# Patient Record
Sex: MALE | Race: WHITE | Employment: OTHER | URBAN - METROPOLITAN AREA
[De-identification: names, ages, dates, MRNs, and addresses within clinical notes are randomized per-mention and may not be internally consistent; named-entity substitution may affect disease eponyms.]

---

## 2017-01-03 ENCOUNTER — OFFICE VISIT (OUTPATIENT)
Dept: FAMILY MEDICINE CLINIC | Age: 62
End: 2017-01-03

## 2017-01-03 VITALS
OXYGEN SATURATION: 98 % | TEMPERATURE: 97.2 F | HEART RATE: 78 BPM | BODY MASS INDEX: 25.03 KG/M2 | RESPIRATION RATE: 13 BRPM | DIASTOLIC BLOOD PRESSURE: 76 MMHG | HEIGHT: 69 IN | WEIGHT: 169 LBS | SYSTOLIC BLOOD PRESSURE: 122 MMHG

## 2017-01-03 DIAGNOSIS — E61.1 IRON DEFICIENCY: ICD-10-CM

## 2017-01-03 DIAGNOSIS — Z11.59 NEED FOR HEPATITIS C SCREENING TEST: ICD-10-CM

## 2017-01-03 DIAGNOSIS — Z11.4 SCREENING FOR HIV WITHOUT PRESENCE OF RISK FACTORS: ICD-10-CM

## 2017-01-03 DIAGNOSIS — I10 ESSENTIAL HYPERTENSION: Primary | ICD-10-CM

## 2017-01-03 DIAGNOSIS — E78.2 MIXED HYPERLIPIDEMIA: ICD-10-CM

## 2017-01-03 PROCEDURE — 99203 OFFICE O/P NEW LOW 30 MIN: CPT | Performed by: FAMILY MEDICINE

## 2017-01-03 RX ORDER — LISINOPRIL 10 MG/1
TABLET ORAL
Qty: 30 TABLET | Refills: 5 | Status: SHIPPED | OUTPATIENT
Start: 2017-01-03 | End: 2017-04-12 | Stop reason: SDUPTHER

## 2017-01-03 RX ORDER — LISINOPRIL 10 MG/1
TABLET ORAL
Refills: 0 | COMMUNITY
Start: 2016-12-19 | End: 2017-01-03 | Stop reason: SDUPTHER

## 2017-01-03 RX ORDER — SIMVASTATIN 10 MG
TABLET ORAL
Qty: 30 TABLET | Refills: 5 | Status: SHIPPED | OUTPATIENT
Start: 2017-01-03 | End: 2017-04-12 | Stop reason: SDUPTHER

## 2017-01-03 RX ORDER — ASPIRIN 81 MG
TABLET, DELAYED RELEASE (ENTERIC COATED) ORAL
Refills: 0 | COMMUNITY
Start: 2016-12-19 | End: 2017-01-03 | Stop reason: SDUPTHER

## 2017-01-03 RX ORDER — ASPIRIN 81 MG
TABLET, DELAYED RELEASE (ENTERIC COATED) ORAL
Qty: 30 TABLET | Refills: 5 | Status: SHIPPED | OUTPATIENT
Start: 2017-01-03 | End: 2017-04-12 | Stop reason: SDUPTHER

## 2017-01-03 RX ORDER — SIMVASTATIN 10 MG
TABLET ORAL
Refills: 0 | COMMUNITY
Start: 2016-12-19 | End: 2017-01-03 | Stop reason: SDUPTHER

## 2017-01-03 ASSESSMENT — ENCOUNTER SYMPTOMS
RESPIRATORY NEGATIVE: 1
EYES NEGATIVE: 1
ALLERGIC/IMMUNOLOGIC NEGATIVE: 1
GASTROINTESTINAL NEGATIVE: 1

## 2017-01-03 ASSESSMENT — PATIENT HEALTH QUESTIONNAIRE - PHQ9
2. FEELING DOWN, DEPRESSED OR HOPELESS: 0
1. LITTLE INTEREST OR PLEASURE IN DOING THINGS: 0
SUM OF ALL RESPONSES TO PHQ9 QUESTIONS 1 & 2: 0
SUM OF ALL RESPONSES TO PHQ QUESTIONS 1-9: 0

## 2017-04-12 ENCOUNTER — OFFICE VISIT (OUTPATIENT)
Dept: FAMILY MEDICINE CLINIC | Age: 62
End: 2017-04-12

## 2017-04-12 VITALS
WEIGHT: 181 LBS | DIASTOLIC BLOOD PRESSURE: 60 MMHG | BODY MASS INDEX: 26.81 KG/M2 | HEIGHT: 69 IN | HEART RATE: 107 BPM | SYSTOLIC BLOOD PRESSURE: 126 MMHG | RESPIRATION RATE: 16 BRPM | OXYGEN SATURATION: 97 % | TEMPERATURE: 97.8 F

## 2017-04-12 DIAGNOSIS — I10 ESSENTIAL HYPERTENSION: ICD-10-CM

## 2017-04-12 DIAGNOSIS — M54.16 LUMBAR RADICULOPATHY, ACUTE: Primary | ICD-10-CM

## 2017-04-12 DIAGNOSIS — E78.2 MIXED HYPERLIPIDEMIA: ICD-10-CM

## 2017-04-12 PROCEDURE — 99213 OFFICE O/P EST LOW 20 MIN: CPT | Performed by: FAMILY MEDICINE

## 2017-04-12 PROCEDURE — 96372 THER/PROPH/DIAG INJ SC/IM: CPT | Performed by: FAMILY MEDICINE

## 2017-04-12 RX ORDER — ASPIRIN 81 MG/1
TABLET ORAL
Qty: 30 TABLET | Refills: 5 | Status: SHIPPED | OUTPATIENT
Start: 2017-04-12 | End: 2017-08-23 | Stop reason: SDUPTHER

## 2017-04-12 RX ORDER — LISINOPRIL 10 MG/1
TABLET ORAL
Qty: 30 TABLET | Refills: 5 | Status: SHIPPED | OUTPATIENT
Start: 2017-04-12 | End: 2017-08-23 | Stop reason: SDUPTHER

## 2017-04-12 RX ORDER — SIMVASTATIN 10 MG
TABLET ORAL
Qty: 30 TABLET | Refills: 5 | Status: SHIPPED | OUTPATIENT
Start: 2017-04-12 | End: 2017-08-23 | Stop reason: SDUPTHER

## 2017-04-12 RX ORDER — TRIAMCINOLONE ACETONIDE 40 MG/ML
80 INJECTION, SUSPENSION INTRA-ARTICULAR; INTRAMUSCULAR ONCE
Status: COMPLETED | OUTPATIENT
Start: 2017-04-12 | End: 2017-04-12

## 2017-04-12 RX ADMIN — TRIAMCINOLONE ACETONIDE 80 MG: 40 INJECTION, SUSPENSION INTRA-ARTICULAR; INTRAMUSCULAR at 09:37

## 2017-04-12 ASSESSMENT — ENCOUNTER SYMPTOMS
BACK PAIN: 1
ABDOMINAL PAIN: 0
BOWEL INCONTINENCE: 0

## 2017-04-20 ENCOUNTER — HOSPITAL ENCOUNTER (OUTPATIENT)
Dept: PHYSICAL THERAPY | Age: 62
Setting detail: THERAPIES SERIES
Discharge: HOME | End: 2017-04-20
Payer: MEDICAID

## 2017-04-20 PROCEDURE — 97110 THERAPEUTIC EXERCISES: CPT

## 2017-04-20 PROCEDURE — 97162 PT EVAL MOD COMPLEX 30 MIN: CPT

## 2017-04-20 ASSESSMENT — PAIN DESCRIPTION - PAIN TYPE: TYPE: CHRONIC PAIN

## 2017-04-20 ASSESSMENT — PAIN SCALES - GENERAL: PAINLEVEL_OUTOF10: 8

## 2017-04-20 ASSESSMENT — PAIN DESCRIPTION - ORIENTATION: ORIENTATION: RIGHT;LOWER

## 2017-04-20 ASSESSMENT — PAIN DESCRIPTION - LOCATION: LOCATION: LEG

## 2017-04-20 ASSESSMENT — PAIN DESCRIPTION - FREQUENCY: FREQUENCY: CONTINUOUS

## 2017-04-25 ENCOUNTER — HOSPITAL ENCOUNTER (OUTPATIENT)
Dept: PHYSICAL THERAPY | Age: 62
Setting detail: THERAPIES SERIES
Discharge: HOME | End: 2017-04-25
Payer: MEDICAID

## 2017-04-25 PROCEDURE — 97110 THERAPEUTIC EXERCISES: CPT

## 2017-04-25 PROCEDURE — 97140 MANUAL THERAPY 1/> REGIONS: CPT

## 2017-04-25 ASSESSMENT — PAIN DESCRIPTION - PAIN TYPE: TYPE: CHRONIC PAIN

## 2017-04-25 ASSESSMENT — PAIN DESCRIPTION - LOCATION: LOCATION: LEG

## 2017-04-25 ASSESSMENT — PAIN DESCRIPTION - ORIENTATION: ORIENTATION: RIGHT

## 2017-04-25 ASSESSMENT — PAIN DESCRIPTION - DESCRIPTORS: DESCRIPTORS: NUMBNESS

## 2017-04-25 ASSESSMENT — PAIN DESCRIPTION - FREQUENCY: FREQUENCY: CONTINUOUS

## 2017-04-25 ASSESSMENT — PAIN SCALES - GENERAL: PAINLEVEL_OUTOF10: 7

## 2017-04-27 ENCOUNTER — HOSPITAL ENCOUNTER (OUTPATIENT)
Dept: PHYSICAL THERAPY | Age: 62
Setting detail: THERAPIES SERIES
Discharge: HOME | End: 2017-04-27
Payer: MEDICAID

## 2017-04-27 ENCOUNTER — OFFICE VISIT (OUTPATIENT)
Dept: FAMILY MEDICINE CLINIC | Age: 62
End: 2017-04-27

## 2017-04-27 VITALS
RESPIRATION RATE: 12 BRPM | HEIGHT: 69 IN | OXYGEN SATURATION: 98 % | DIASTOLIC BLOOD PRESSURE: 80 MMHG | WEIGHT: 179 LBS | SYSTOLIC BLOOD PRESSURE: 136 MMHG | BODY MASS INDEX: 26.51 KG/M2 | TEMPERATURE: 97.7 F | HEART RATE: 85 BPM

## 2017-04-27 DIAGNOSIS — M51.36 DDD (DEGENERATIVE DISC DISEASE), LUMBAR: Primary | ICD-10-CM

## 2017-04-27 DIAGNOSIS — M47.26 OSTEOARTHRITIS OF SPINE WITH RADICULOPATHY, LUMBAR REGION: ICD-10-CM

## 2017-04-27 PROBLEM — M51.369 DDD (DEGENERATIVE DISC DISEASE), LUMBAR: Status: ACTIVE | Noted: 2017-04-27

## 2017-04-27 PROCEDURE — 97140 MANUAL THERAPY 1/> REGIONS: CPT

## 2017-04-27 PROCEDURE — 99213 OFFICE O/P EST LOW 20 MIN: CPT | Performed by: FAMILY MEDICINE

## 2017-04-27 PROCEDURE — 97110 THERAPEUTIC EXERCISES: CPT

## 2017-04-27 ASSESSMENT — PAIN DESCRIPTION - DIRECTION: RADIATING_TOWARDS: NO LBP

## 2017-04-27 ASSESSMENT — PAIN DESCRIPTION - ORIENTATION: ORIENTATION: RIGHT

## 2017-04-27 ASSESSMENT — PAIN DESCRIPTION - LOCATION: LOCATION: LEG

## 2017-04-27 ASSESSMENT — PAIN DESCRIPTION - PROGRESSION: CLINICAL_PROGRESSION: GRADUALLY IMPROVING

## 2017-04-27 ASSESSMENT — PAIN DESCRIPTION - PAIN TYPE: TYPE: CHRONIC PAIN

## 2017-04-27 ASSESSMENT — ENCOUNTER SYMPTOMS
BACK PAIN: 1
ABDOMINAL PAIN: 0

## 2017-04-27 ASSESSMENT — PAIN SCALES - GENERAL: PAINLEVEL_OUTOF10: 6

## 2017-04-27 ASSESSMENT — PAIN DESCRIPTION - FREQUENCY: FREQUENCY: CONTINUOUS

## 2017-04-27 ASSESSMENT — PAIN DESCRIPTION - DESCRIPTORS: DESCRIPTORS: NUMBNESS

## 2017-05-02 ENCOUNTER — HOSPITAL ENCOUNTER (OUTPATIENT)
Dept: PHYSICAL THERAPY | Age: 62
Setting detail: THERAPIES SERIES
Discharge: HOME | End: 2017-05-02
Payer: MEDICAID

## 2017-05-02 PROCEDURE — 97110 THERAPEUTIC EXERCISES: CPT

## 2017-05-02 PROCEDURE — 97140 MANUAL THERAPY 1/> REGIONS: CPT

## 2017-05-02 ASSESSMENT — PAIN DESCRIPTION - PAIN TYPE: TYPE: CHRONIC PAIN

## 2017-05-02 ASSESSMENT — PAIN SCALES - GENERAL: PAINLEVEL_OUTOF10: 6

## 2017-05-02 ASSESSMENT — PAIN DESCRIPTION - LOCATION: LOCATION: LEG

## 2017-05-02 ASSESSMENT — PAIN DESCRIPTION - ORIENTATION: ORIENTATION: RIGHT

## 2017-05-04 ENCOUNTER — HOSPITAL ENCOUNTER (OUTPATIENT)
Dept: PHYSICAL THERAPY | Age: 62
Setting detail: THERAPIES SERIES
Discharge: HOME | End: 2017-05-04
Payer: MEDICAID

## 2017-05-04 PROCEDURE — 97140 MANUAL THERAPY 1/> REGIONS: CPT

## 2017-05-04 PROCEDURE — 97110 THERAPEUTIC EXERCISES: CPT

## 2017-05-09 ENCOUNTER — HOSPITAL ENCOUNTER (OUTPATIENT)
Dept: PHYSICAL THERAPY | Age: 62
Setting detail: THERAPIES SERIES
Discharge: HOME | End: 2017-05-09
Payer: MEDICAID

## 2017-05-09 PROCEDURE — 97110 THERAPEUTIC EXERCISES: CPT

## 2017-05-09 PROCEDURE — 97140 MANUAL THERAPY 1/> REGIONS: CPT

## 2017-05-11 ENCOUNTER — HOSPITAL ENCOUNTER (OUTPATIENT)
Dept: PHYSICAL THERAPY | Age: 62
Setting detail: THERAPIES SERIES
Discharge: HOME | End: 2017-05-11
Payer: MEDICAID

## 2017-05-11 PROCEDURE — 97110 THERAPEUTIC EXERCISES: CPT

## 2017-05-11 PROCEDURE — 97140 MANUAL THERAPY 1/> REGIONS: CPT

## 2017-05-16 ENCOUNTER — HOSPITAL ENCOUNTER (OUTPATIENT)
Dept: PHYSICAL THERAPY | Age: 62
Setting detail: THERAPIES SERIES
Discharge: HOME | End: 2017-05-16
Payer: MEDICAID

## 2017-05-16 PROCEDURE — 97110 THERAPEUTIC EXERCISES: CPT

## 2017-05-18 ENCOUNTER — HOSPITAL ENCOUNTER (OUTPATIENT)
Dept: PHYSICAL THERAPY | Age: 62
Setting detail: THERAPIES SERIES
Discharge: HOME | End: 2017-05-18
Payer: MEDICAID

## 2017-05-18 PROCEDURE — 97110 THERAPEUTIC EXERCISES: CPT

## 2017-05-18 PROCEDURE — 97140 MANUAL THERAPY 1/> REGIONS: CPT

## 2017-05-23 ENCOUNTER — HOSPITAL ENCOUNTER (OUTPATIENT)
Dept: PHYSICAL THERAPY | Age: 62
Setting detail: THERAPIES SERIES
Discharge: HOME | End: 2017-05-23
Payer: MEDICAID

## 2017-05-23 PROCEDURE — 97110 THERAPEUTIC EXERCISES: CPT

## 2017-08-03 DIAGNOSIS — Z11.4 SCREENING FOR HIV WITHOUT PRESENCE OF RISK FACTORS: ICD-10-CM

## 2017-08-03 DIAGNOSIS — Z11.59 NEED FOR HEPATITIS C SCREENING TEST: ICD-10-CM

## 2017-08-03 DIAGNOSIS — E78.2 MIXED HYPERLIPIDEMIA: ICD-10-CM

## 2017-08-03 DIAGNOSIS — E61.1 IRON DEFICIENCY: ICD-10-CM

## 2017-08-03 DIAGNOSIS — I10 ESSENTIAL HYPERTENSION: ICD-10-CM

## 2017-08-03 LAB
ALBUMIN SERPL-MCNC: 4.1 G/DL (ref 3.9–4.9)
ALP BLD-CCNC: 48 U/L (ref 35–104)
ALT SERPL-CCNC: 20 U/L (ref 0–41)
ANION GAP SERPL CALCULATED.3IONS-SCNC: 14 MEQ/L (ref 7–13)
AST SERPL-CCNC: 21 U/L (ref 0–40)
BASOPHILS ABSOLUTE: 0 K/UL (ref 0–0.2)
BASOPHILS RELATIVE PERCENT: 0.7 %
BILIRUB SERPL-MCNC: 0.4 MG/DL (ref 0–1.2)
BUN BLDV-MCNC: 18 MG/DL (ref 8–23)
CALCIUM SERPL-MCNC: 9 MG/DL (ref 8.6–10.2)
CHLORIDE BLD-SCNC: 104 MEQ/L (ref 98–107)
CHOLESTEROL, TOTAL: 157 MG/DL (ref 0–199)
CO2: 22 MEQ/L (ref 22–29)
CREAT SERPL-MCNC: 1.14 MG/DL (ref 0.7–1.2)
EOSINOPHILS ABSOLUTE: 0.1 K/UL (ref 0–0.7)
EOSINOPHILS RELATIVE PERCENT: 2.1 %
GFR AFRICAN AMERICAN: >60
GFR NON-AFRICAN AMERICAN: >60
GLOBULIN: 2.6 G/DL (ref 2.3–3.5)
GLUCOSE BLD-MCNC: 143 MG/DL (ref 74–109)
HCT VFR BLD CALC: 40.2 % (ref 42–52)
HDLC SERPL-MCNC: 58 MG/DL (ref 40–59)
HEMOGLOBIN: 13.4 G/DL (ref 14–18)
HEPATITIS C ANTIBODY INTERPRETATION: NORMAL
IRON SATURATION: 19 % (ref 11–46)
IRON: 78 UG/DL (ref 59–158)
LDL CHOLESTEROL CALCULATED: 83 MG/DL (ref 0–129)
LYMPHOCYTES ABSOLUTE: 1.2 K/UL (ref 1–4.8)
LYMPHOCYTES RELATIVE PERCENT: 22.9 %
MCH RBC QN AUTO: 30.7 PG (ref 27–31.3)
MCHC RBC AUTO-ENTMCNC: 33.3 % (ref 33–37)
MCV RBC AUTO: 92.1 FL (ref 80–100)
MONOCYTES ABSOLUTE: 0.5 K/UL (ref 0.2–0.8)
MONOCYTES RELATIVE PERCENT: 9.3 %
NEUTROPHILS ABSOLUTE: 3.3 K/UL (ref 1.4–6.5)
NEUTROPHILS RELATIVE PERCENT: 65 %
PDW BLD-RTO: 13.8 % (ref 11.5–14.5)
PLATELET # BLD: 150 K/UL (ref 130–400)
POTASSIUM SERPL-SCNC: 4.5 MEQ/L (ref 3.5–5.1)
RBC # BLD: 4.37 M/UL (ref 4.7–6.1)
SODIUM BLD-SCNC: 140 MEQ/L (ref 132–144)
TOTAL IRON BINDING CAPACITY: 415 UG/DL (ref 178–450)
TOTAL PROTEIN: 6.7 G/DL (ref 6.4–8.1)
TRIGL SERPL-MCNC: 81 MG/DL (ref 0–200)
WBC # BLD: 5 K/UL (ref 4.8–10.8)

## 2017-08-04 LAB
HIV-1 AND HIV-2 ANTIBODIES: NEGATIVE
TSH, 3RD GENERATION: 2.04 MU/L (ref 0.3–4)

## 2017-08-05 LAB
VITAMIN D2 AND D3, TOTAL: 32.5 NG/ML (ref 30–80)
VITAMIN D2, 25 HYDROXY: <1 NG/ML
VITAMIN D3,25 HYDROXY: 32.5 NG/ML

## 2017-08-23 ENCOUNTER — OFFICE VISIT (OUTPATIENT)
Dept: FAMILY MEDICINE CLINIC | Age: 62
End: 2017-08-23

## 2017-08-23 VITALS
DIASTOLIC BLOOD PRESSURE: 80 MMHG | WEIGHT: 178 LBS | HEART RATE: 87 BPM | OXYGEN SATURATION: 98 % | TEMPERATURE: 97.3 F | RESPIRATION RATE: 16 BRPM | SYSTOLIC BLOOD PRESSURE: 122 MMHG | BODY MASS INDEX: 26.36 KG/M2 | HEIGHT: 69 IN

## 2017-08-23 DIAGNOSIS — E78.2 MIXED HYPERLIPIDEMIA: ICD-10-CM

## 2017-08-23 DIAGNOSIS — I10 ESSENTIAL HYPERTENSION: Primary | ICD-10-CM

## 2017-08-23 DIAGNOSIS — R73.9 HYPERGLYCEMIA: ICD-10-CM

## 2017-08-23 DIAGNOSIS — Z12.11 SCREENING FOR COLON CANCER: ICD-10-CM

## 2017-08-23 PROCEDURE — 93000 ELECTROCARDIOGRAM COMPLETE: CPT | Performed by: FAMILY MEDICINE

## 2017-08-23 PROCEDURE — 99214 OFFICE O/P EST MOD 30 MIN: CPT | Performed by: FAMILY MEDICINE

## 2017-08-23 RX ORDER — POLYETHYLENE GLYCOL 3350, SODIUM CHLORIDE, SODIUM BICARBONATE, POTASSIUM CHLORIDE 420; 11.2; 5.72; 1.48 G/4L; G/4L; G/4L; G/4L
4000 POWDER, FOR SOLUTION ORAL ONCE
Qty: 4000 ML | Refills: 0 | Status: CANCELLED | OUTPATIENT
Start: 2017-08-23 | End: 2017-08-23

## 2017-08-23 RX ORDER — LISINOPRIL 10 MG/1
TABLET ORAL
Qty: 30 TABLET | Refills: 5 | Status: SHIPPED | OUTPATIENT
Start: 2017-08-23 | End: 2018-02-20 | Stop reason: SDUPTHER

## 2017-08-23 RX ORDER — SIMVASTATIN 10 MG
TABLET ORAL
Qty: 30 TABLET | Refills: 5 | Status: SHIPPED | OUTPATIENT
Start: 2017-08-23 | End: 2018-02-20 | Stop reason: SDUPTHER

## 2017-08-23 RX ORDER — ASPIRIN 81 MG/1
TABLET ORAL
Qty: 30 TABLET | Refills: 5 | Status: SHIPPED | OUTPATIENT
Start: 2017-08-23 | End: 2018-02-20 | Stop reason: SDUPTHER

## 2017-08-23 ASSESSMENT — ENCOUNTER SYMPTOMS
RESPIRATORY NEGATIVE: 1
ALLERGIC/IMMUNOLOGIC NEGATIVE: 1
BLURRED VISION: 0
ORTHOPNEA: 0
GASTROINTESTINAL NEGATIVE: 1
SHORTNESS OF BREATH: 0
EYES NEGATIVE: 1

## 2017-11-10 DIAGNOSIS — R73.9 HYPERGLYCEMIA: ICD-10-CM

## 2017-11-10 DIAGNOSIS — E78.2 MIXED HYPERLIPIDEMIA: ICD-10-CM

## 2017-11-10 DIAGNOSIS — I10 ESSENTIAL HYPERTENSION: ICD-10-CM

## 2017-11-10 LAB
ALBUMIN SERPL-MCNC: 3.9 G/DL (ref 3.9–4.9)
ALP BLD-CCNC: 51 U/L (ref 35–104)
ALT SERPL-CCNC: 35 U/L (ref 0–41)
ANION GAP SERPL CALCULATED.3IONS-SCNC: 16 MEQ/L (ref 7–13)
AST SERPL-CCNC: 30 U/L (ref 0–40)
BILIRUB SERPL-MCNC: 0.4 MG/DL (ref 0–1.2)
BUN BLDV-MCNC: 17 MG/DL (ref 8–23)
CALCIUM SERPL-MCNC: 9.4 MG/DL (ref 8.6–10.2)
CHLORIDE BLD-SCNC: 104 MEQ/L (ref 98–107)
CHOLESTEROL, TOTAL: 166 MG/DL (ref 0–199)
CO2: 23 MEQ/L (ref 22–29)
CREAT SERPL-MCNC: 1.13 MG/DL (ref 0.7–1.2)
CREATININE URINE: 235 MG/DL
GFR AFRICAN AMERICAN: >60
GFR NON-AFRICAN AMERICAN: >60
GLOBULIN: 2.7 G/DL (ref 2.3–3.5)
GLUCOSE BLD-MCNC: 180 MG/DL (ref 74–109)
HBA1C MFR BLD: 8.4 % (ref 4.8–5.9)
HDLC SERPL-MCNC: 58 MG/DL (ref 40–59)
LDL CHOLESTEROL CALCULATED: 87 MG/DL (ref 0–129)
MICROALBUMIN UR-MCNC: 1.3 MG/DL
MICROALBUMIN/CREAT UR-RTO: 5.5 MG/G (ref 0–30)
POTASSIUM SERPL-SCNC: 4.7 MEQ/L (ref 3.5–5.1)
SODIUM BLD-SCNC: 143 MEQ/L (ref 132–144)
TOTAL PROTEIN: 6.6 G/DL (ref 6.4–8.1)
TRIGL SERPL-MCNC: 106 MG/DL (ref 0–200)

## 2017-11-17 ENCOUNTER — OFFICE VISIT (OUTPATIENT)
Dept: FAMILY MEDICINE CLINIC | Age: 62
End: 2017-11-17

## 2017-11-17 VITALS
OXYGEN SATURATION: 99 % | WEIGHT: 191 LBS | DIASTOLIC BLOOD PRESSURE: 72 MMHG | HEIGHT: 69 IN | HEART RATE: 85 BPM | RESPIRATION RATE: 16 BRPM | BODY MASS INDEX: 28.29 KG/M2 | TEMPERATURE: 97.6 F | SYSTOLIC BLOOD PRESSURE: 120 MMHG

## 2017-11-17 DIAGNOSIS — E78.2 MIXED HYPERLIPIDEMIA: ICD-10-CM

## 2017-11-17 DIAGNOSIS — E11.9 TYPE 2 DIABETES MELLITUS WITHOUT COMPLICATION, WITHOUT LONG-TERM CURRENT USE OF INSULIN (HCC): Primary | ICD-10-CM

## 2017-11-17 DIAGNOSIS — I10 ESSENTIAL HYPERTENSION: ICD-10-CM

## 2017-11-17 DIAGNOSIS — Z23 NEED FOR 23-POLYVALENT PNEUMOCOCCAL POLYSACCHARIDE VACCINE: ICD-10-CM

## 2017-11-17 PROCEDURE — 3017F COLORECTAL CA SCREEN DOC REV: CPT | Performed by: FAMILY MEDICINE

## 2017-11-17 PROCEDURE — G8427 DOCREV CUR MEDS BY ELIG CLIN: HCPCS | Performed by: FAMILY MEDICINE

## 2017-11-17 PROCEDURE — 3045F PR MOST RECENT HEMOGLOBIN A1C LEVEL 7.0-9.0%: CPT | Performed by: FAMILY MEDICINE

## 2017-11-17 PROCEDURE — 1036F TOBACCO NON-USER: CPT | Performed by: FAMILY MEDICINE

## 2017-11-17 PROCEDURE — G8482 FLU IMMUNIZE ORDER/ADMIN: HCPCS | Performed by: FAMILY MEDICINE

## 2017-11-17 PROCEDURE — 90471 IMMUNIZATION ADMIN: CPT | Performed by: FAMILY MEDICINE

## 2017-11-17 PROCEDURE — G8419 CALC BMI OUT NRM PARAM NOF/U: HCPCS | Performed by: FAMILY MEDICINE

## 2017-11-17 PROCEDURE — 90732 PPSV23 VACC 2 YRS+ SUBQ/IM: CPT | Performed by: FAMILY MEDICINE

## 2017-11-17 PROCEDURE — 99214 OFFICE O/P EST MOD 30 MIN: CPT | Performed by: FAMILY MEDICINE

## 2017-11-17 ASSESSMENT — ENCOUNTER SYMPTOMS
EYES NEGATIVE: 1
ALLERGIC/IMMUNOLOGIC NEGATIVE: 1
GASTROINTESTINAL NEGATIVE: 1
RESPIRATORY NEGATIVE: 1
SHORTNESS OF BREATH: 0

## 2017-11-17 NOTE — PROGRESS NOTES
Subjective  Alisson Perales, 57 y.o. male presents today with:  Chief Complaint   Patient presents with    3 Month Follow-Up    Hypertension    Hyperlipidemia    Arthritis    Results     labs        Diabetes   He presents for his follow-up diabetic visit. He has type 2 diabetes mellitus. His disease course has been stable. There are no hypoglycemic associated symptoms. Pertinent negatives for hypoglycemia include no headaches. There are no diabetic associated symptoms. Pertinent negatives for diabetes include no chest pain. There are no hypoglycemic complications. Symptoms are stable. Pertinent negatives for diabetic complications include no autonomic neuropathy, CVA, heart disease, impotence, nephropathy, peripheral neuropathy, PVD or retinopathy. Risk factors for coronary artery disease include diabetes mellitus, dyslipidemia and hypertension. When asked about current treatments, none were reported. He is compliant with treatment all of the time. His weight is stable. He is following a generally healthy diet. Meal planning includes avoidance of concentrated sweets. He has not had a previous visit with a dietitian (ordered but insurance doesn't cover- per pt insurance sent  diabetes teaching [facility] he has reviewed). He participates in exercise weekly. There is no change in his home blood glucose trend. An ACE inhibitor/angiotensin II receptor blocker is being taken. He does not see a podiatrist.Eye exam is current. Hypertension   Pertinent negatives include no chest pain, headaches, neck pain, palpitations or shortness of breath. There is no history of CVA, PVD or retinopathy. Hyperlipidemia   Pertinent negatives include no chest pain or shortness of breath. Review of Systems   Constitutional: Negative. HENT: Negative. Eyes: Negative. Respiratory: Negative. Negative for shortness of breath. Cardiovascular: Negative. Negative for chest pain and palpitations.    Gastrointestinal: Negative. Endocrine: Negative. Genitourinary: Negative. Negative for impotence. Musculoskeletal: Negative. Negative for neck pain. Skin: Negative. Allergic/Immunologic: Negative. Neurological: Negative. Negative for headaches. Hematological: Negative. Psychiatric/Behavioral: Negative. Past Medical History:   Diagnosis Date    Cancer ([facility] [address]     Hyperlipidemia     Hypertension      Past Surgical History:   Procedure Laterality Date    TUMOR REMOVAL  1994     Social History     Social History    Marital status: Single     Spouse name: N/A    Number of children: N/A    Years of education: N/A     Occupational History    Not on file. Social History Main Topics    Smoking status: Never Smoker    Smokeless tobacco: Never Used    Alcohol use No    Drug use: No    Sexual activity: Not on file     Other Topics Concern    Not on file     Social History Narrative    No narrative on file     Family History   Problem Relation Age of Onset    Cancer Mother      ovarian     Other Father      No Known Allergies  Current Outpatient Prescriptions on File Prior to Visit   Medication Sig Dispense Refill    aspirin (ASPIRIN LOW DOSE) 81 MG EC tablet take 1 tablet by mouth once daily 30 tablet 5    lisinopril (PRINIVIL;ZESTRIL) 10 MG tablet take 1 tablet by mouth once daily 30 tablet 5    simvastatin (ZOCOR) 10 MG tablet take 1 tablet by mouth once daily 30 tablet 5     No current facility-administered medications on file prior to visit. Objective    Vitals:    11/17/17 0813   BP: 120/72   Pulse: 85   Resp: 16   Temp: 97.6 °F (36.4 °C)   TempSrc: Tympanic   SpO2: 99%   Weight: 191 lb (86.6 kg)   Height: 5' 8.5\" (1.74 m)     Physical Exam   Constitutional: Vital signs are normal. He appears well-developed. HENT:   Head: Normocephalic and atraumatic. Right Ear: Tympanic membrane, external ear and ear canal normal. Tympanic membrane is not injected. No middle ear effusion. Metabolic Panel    Hemoglobin A1C    Lipid Panel    Microalbumin / Creatinine Urine Ratio    TSH ULTRASENSITIVE, DIRECTED   2. Essential hypertension  Comprehensive Metabolic Panel    Lipid Panel    Microalbumin / Creatinine Urine Ratio    TSH ULTRASENSITIVE, DIRECTED   3. Mixed hyperlipidemia  Comprehensive Metabolic Panel    Lipid Panel    TSH ULTRASENSITIVE, DIRECTED   4. Need for 23-polyvalent pneumococcal polysaccharide vaccine  Pneumococcal polysaccharide vaccine 23-valent greater than or equal to 3yo subcutaneous/IM     Orders Placed This Encounter   Procedures    Pneumococcal polysaccharide vaccine 23-valent greater than or equal to 3yo subcutaneous/IM    Comprehensive Metabolic Panel     Standing Status:   Future     Standing Expiration Date:   11/17/2018    Hemoglobin A1C     Standing Status:   Future     Standing Expiration Date:   11/17/2018    Lipid Panel     Standing Status:   Future     Standing Expiration Date:   11/17/2018     Order Specific Question:   Is Patient Fasting?/# of Hours     Answer:   8    Microalbumin / Creatinine Urine Ratio     Standing Status:   Future     Standing Expiration Date:   11/17/2018    TSH ULTRASENSITIVE, DIRECTED     Standing Status:   Future     Standing Expiration Date:   11/17/2018     Orders Placed This Encounter   Medications    metFORMIN (GLUCOPHAGE) 500 MG tablet     Sig: Take 1 tablet by mouth 2 times daily (with meals)     Dispense:  60 tablet     Refill:  3    SITagliptin (JANUVIA) 100 MG tablet     Sig: Take 1 tablet by mouth daily     Dispense:  30 tablet     Refill:  3     There are no discontinued medications. Counseling given: Yes      Return in about 3 months (around 2/17/2018).     Trev Bishop, DO

## 2018-02-13 DIAGNOSIS — E11.9 TYPE 2 DIABETES MELLITUS WITHOUT COMPLICATION, WITHOUT LONG-TERM CURRENT USE OF INSULIN (HCC): ICD-10-CM

## 2018-02-13 DIAGNOSIS — I10 ESSENTIAL HYPERTENSION: ICD-10-CM

## 2018-02-13 DIAGNOSIS — E78.2 MIXED HYPERLIPIDEMIA: ICD-10-CM

## 2018-02-13 LAB
ALBUMIN SERPL-MCNC: 4.5 G/DL (ref 3.9–4.9)
ALP BLD-CCNC: 57 U/L (ref 35–104)
ALT SERPL-CCNC: 30 U/L (ref 0–41)
ANION GAP SERPL CALCULATED.3IONS-SCNC: 16 MEQ/L (ref 7–13)
AST SERPL-CCNC: 24 U/L (ref 0–40)
BILIRUB SERPL-MCNC: 0.3 MG/DL (ref 0–1.2)
BUN BLDV-MCNC: 20 MG/DL (ref 8–23)
CALCIUM SERPL-MCNC: 10.2 MG/DL (ref 8.6–10.2)
CHLORIDE BLD-SCNC: 100 MEQ/L (ref 98–107)
CHOLESTEROL, TOTAL: 174 MG/DL (ref 0–199)
CO2: 23 MEQ/L (ref 22–29)
CREAT SERPL-MCNC: 1.26 MG/DL (ref 0.7–1.2)
CREATININE URINE: 275.8 MG/DL
GFR AFRICAN AMERICAN: >60
GFR NON-AFRICAN AMERICAN: 57.5
GLOBULIN: 2.7 G/DL (ref 2.3–3.5)
GLUCOSE BLD-MCNC: 201 MG/DL (ref 74–109)
HBA1C MFR BLD: 9.1 % (ref 4.8–5.9)
HDLC SERPL-MCNC: 55 MG/DL (ref 40–59)
LDL CHOLESTEROL CALCULATED: 92 MG/DL (ref 0–129)
MICROALBUMIN UR-MCNC: 1.5 MG/DL
MICROALBUMIN/CREAT UR-RTO: 5.4 MG/G (ref 0–30)
POTASSIUM SERPL-SCNC: 4.7 MEQ/L (ref 3.5–5.1)
SODIUM BLD-SCNC: 139 MEQ/L (ref 132–144)
TOTAL PROTEIN: 7.2 G/DL (ref 6.4–8.1)
TRIGL SERPL-MCNC: 133 MG/DL (ref 0–200)
TSH REFLEX: 2.29 UIU/ML (ref 0.27–4.2)

## 2018-02-20 ENCOUNTER — OFFICE VISIT (OUTPATIENT)
Dept: FAMILY MEDICINE CLINIC | Age: 63
End: 2018-02-20
Payer: MEDICAID

## 2018-02-20 VITALS
BODY MASS INDEX: 28.44 KG/M2 | DIASTOLIC BLOOD PRESSURE: 70 MMHG | HEART RATE: 96 BPM | HEIGHT: 69 IN | TEMPERATURE: 97.4 F | OXYGEN SATURATION: 98 % | WEIGHT: 192 LBS | RESPIRATION RATE: 16 BRPM | SYSTOLIC BLOOD PRESSURE: 118 MMHG

## 2018-02-20 DIAGNOSIS — E78.2 MIXED HYPERLIPIDEMIA: ICD-10-CM

## 2018-02-20 DIAGNOSIS — Z12.11 COLON CANCER SCREENING: ICD-10-CM

## 2018-02-20 DIAGNOSIS — E11.9 TYPE 2 DIABETES MELLITUS WITHOUT COMPLICATION, WITHOUT LONG-TERM CURRENT USE OF INSULIN (HCC): Primary | ICD-10-CM

## 2018-02-20 DIAGNOSIS — I10 ESSENTIAL HYPERTENSION: ICD-10-CM

## 2018-02-20 PROCEDURE — 99214 OFFICE O/P EST MOD 30 MIN: CPT | Performed by: FAMILY MEDICINE

## 2018-02-20 PROCEDURE — 3046F HEMOGLOBIN A1C LEVEL >9.0%: CPT | Performed by: FAMILY MEDICINE

## 2018-02-20 PROCEDURE — 1036F TOBACCO NON-USER: CPT | Performed by: FAMILY MEDICINE

## 2018-02-20 PROCEDURE — G8427 DOCREV CUR MEDS BY ELIG CLIN: HCPCS | Performed by: FAMILY MEDICINE

## 2018-02-20 PROCEDURE — G8419 CALC BMI OUT NRM PARAM NOF/U: HCPCS | Performed by: FAMILY MEDICINE

## 2018-02-20 PROCEDURE — 3017F COLORECTAL CA SCREEN DOC REV: CPT | Performed by: FAMILY MEDICINE

## 2018-02-20 PROCEDURE — G8482 FLU IMMUNIZE ORDER/ADMIN: HCPCS | Performed by: FAMILY MEDICINE

## 2018-02-20 RX ORDER — POLYETHYLENE GLYCOL 3350, SODIUM CHLORIDE, SODIUM BICARBONATE, POTASSIUM CHLORIDE 420; 11.2; 5.72; 1.48 G/4L; G/4L; G/4L; G/4L
4000 POWDER, FOR SOLUTION ORAL ONCE
Qty: 4000 ML | Refills: 0 | Status: SHIPPED | OUTPATIENT
Start: 2018-02-20 | End: 2018-02-20

## 2018-02-20 RX ORDER — LANCETS 30 GAUGE
EACH MISCELLANEOUS
Qty: 100 EACH | Refills: 1 | Status: SHIPPED | OUTPATIENT
Start: 2018-02-20 | End: 2018-04-23

## 2018-02-20 RX ORDER — ASPIRIN 81 MG/1
TABLET ORAL
Qty: 30 TABLET | Refills: 5 | Status: SHIPPED | OUTPATIENT
Start: 2018-02-20 | End: 2018-04-16 | Stop reason: SDUPTHER

## 2018-02-20 RX ORDER — LISINOPRIL 10 MG/1
TABLET ORAL
Qty: 30 TABLET | Refills: 5 | Status: SHIPPED | OUTPATIENT
Start: 2018-02-20 | End: 2018-04-16 | Stop reason: SDUPTHER

## 2018-02-20 RX ORDER — SIMVASTATIN 10 MG
TABLET ORAL
Qty: 30 TABLET | Refills: 5 | Status: SHIPPED | OUTPATIENT
Start: 2018-02-20 | End: 2018-04-16 | Stop reason: SDUPTHER

## 2018-02-20 ASSESSMENT — ENCOUNTER SYMPTOMS
SHORTNESS OF BREATH: 0
EYES NEGATIVE: 1
ALLERGIC/IMMUNOLOGIC NEGATIVE: 1
GASTROINTESTINAL NEGATIVE: 1
RESPIRATORY NEGATIVE: 1

## 2018-02-20 ASSESSMENT — PATIENT HEALTH QUESTIONNAIRE - PHQ9
SUM OF ALL RESPONSES TO PHQ QUESTIONS 1-9: 0
1. LITTLE INTEREST OR PLEASURE IN DOING THINGS: 0
SUM OF ALL RESPONSES TO PHQ9 QUESTIONS 1 & 2: 0
2. FEELING DOWN, DEPRESSED OR HOPELESS: 0

## 2018-02-20 NOTE — PROGRESS NOTES
maxillary sinus tenderness and no frontal sinus tenderness. Eyes: Conjunctivae, EOM and lids are normal. Pupils are equal, round, and reactive to light. Neck: Normal range of motion. Neck supple. No JVD present. No muscular tenderness present. No neck rigidity. No tracheal deviation present. No thyroid mass and no thyromegaly present. Cardiovascular: Normal rate, regular rhythm and intact distal pulses. Pulmonary/Chest: Effort normal. He has no decreased breath sounds. He has no wheezes. He has no rhonchi. He has no rales. He exhibits no tenderness and no deformity. Abdominal: Soft. Normal appearance and bowel sounds are normal. He exhibits no distension and no mass. There is no splenomegaly or hepatomegaly. There is no tenderness. There is no rigidity, no rebound and no guarding. No hernia. Musculoskeletal: Normal range of motion. Right knee: Normal.        Left knee: Normal.        Cervical back: Normal.        Thoracic back: Normal.        Lumbar back: Normal.        Lymphadenopathy:     He has no cervical adenopathy. Neurological: He is alert. He has normal strength. He displays no atrophy and no tremor. No cranial nerve deficit or sensory deficit. Coordination and gait normal.   Skin: Skin is warm, dry and intact. No rash noted. Psychiatric: He has a normal mood and affect. His speech is normal and behavior is normal. Judgment and thought content normal. Cognition and memory are normal.            Assessment & Plan   1. Type 2 diabetes mellitus without complication, without long-term current use of insulin (formerly [facility])  metFORMIN (GLUCOPHAGE) 500 MG tablet    glucose blood VI test strips (ASCENSIA AUTODISC VI;ONE TOUCH ULTRA TEST VI) strip    Blood Glucose Monitoring Suppl BRANDYN Hernandez MISC    HM DIABETES FOOT EXAM    Ambulatory referral to Diabetic Education    SITagliptin (JANUVIA) 100 MG tablet   2.  Mixed hyperlipidemia  simvastatin (ZOCOR) 10 MG tablet    aspirin (ASPIRIN LOW DOSE) 81 MG

## 2018-03-06 ENCOUNTER — TELEPHONE (OUTPATIENT)
Dept: ENDOSCOPY | Age: 63
End: 2018-03-06

## 2018-03-29 ENCOUNTER — HOSPITAL ENCOUNTER (OUTPATIENT)
Age: 63
Setting detail: OUTPATIENT SURGERY
Discharge: HOME | End: 2018-03-29
Attending: SPECIALIST | Admitting: SPECIALIST
Payer: MEDICAID

## 2018-03-29 ENCOUNTER — ANESTHESIA (OUTPATIENT)
Dept: ENDOSCOPY | Age: 63
End: 2018-03-29
Payer: MEDICAID

## 2018-03-29 ENCOUNTER — ANESTHESIA EVENT (OUTPATIENT)
Dept: ENDOSCOPY | Age: 63
End: 2018-03-29
Payer: MEDICAID

## 2018-03-29 VITALS
HEIGHT: 70 IN | HEART RATE: 70 BPM | OXYGEN SATURATION: 97 % | DIASTOLIC BLOOD PRESSURE: 53 MMHG | BODY MASS INDEX: 26.77 KG/M2 | WEIGHT: 187 LBS | RESPIRATION RATE: 18 BRPM | TEMPERATURE: 98.2 F | SYSTOLIC BLOOD PRESSURE: 120 MMHG

## 2018-03-29 VITALS
DIASTOLIC BLOOD PRESSURE: 51 MMHG | OXYGEN SATURATION: 96 % | RESPIRATION RATE: 15 BRPM | SYSTOLIC BLOOD PRESSURE: 96 MMHG

## 2018-03-29 PROCEDURE — 3700000001 HC ADD 15 MINUTES (ANESTHESIA): Performed by: SPECIALIST

## 2018-03-29 PROCEDURE — 7100000010 HC PHASE II RECOVERY - FIRST 15 MIN: Performed by: SPECIALIST

## 2018-03-29 PROCEDURE — 2580000003 HC RX 258: Performed by: SPECIALIST

## 2018-03-29 PROCEDURE — 3700000000 HC ANESTHESIA ATTENDED CARE: Performed by: SPECIALIST

## 2018-03-29 PROCEDURE — 6360000002 HC RX W HCPCS: Performed by: NURSE ANESTHETIST, CERTIFIED REGISTERED

## 2018-03-29 PROCEDURE — 3609027000 HC COLONOSCOPY: Performed by: SPECIALIST

## 2018-03-29 RX ORDER — LIDOCAINE HYDROCHLORIDE 10 MG/ML
1 INJECTION, SOLUTION EPIDURAL; INFILTRATION; INTRACAUDAL; PERINEURAL
Status: DISCONTINUED | OUTPATIENT
Start: 2018-03-29 | End: 2018-03-29 | Stop reason: HOSPADM

## 2018-03-29 RX ORDER — SODIUM CHLORIDE 9 MG/ML
INJECTION, SOLUTION INTRAVENOUS CONTINUOUS
Status: DISCONTINUED | OUTPATIENT
Start: 2018-03-29 | End: 2018-03-29 | Stop reason: HOSPADM

## 2018-03-29 RX ORDER — SODIUM CHLORIDE 0.9 % (FLUSH) 0.9 %
10 SYRINGE (ML) INJECTION EVERY 12 HOURS SCHEDULED
Status: DISCONTINUED | OUTPATIENT
Start: 2018-03-29 | End: 2018-03-29 | Stop reason: HOSPADM

## 2018-03-29 RX ORDER — SODIUM CHLORIDE 0.9 % (FLUSH) 0.9 %
10 SYRINGE (ML) INJECTION PRN
Status: DISCONTINUED | OUTPATIENT
Start: 2018-03-29 | End: 2018-03-29 | Stop reason: HOSPADM

## 2018-03-29 RX ORDER — ONDANSETRON 2 MG/ML
4 INJECTION INTRAMUSCULAR; INTRAVENOUS
Status: DISCONTINUED | OUTPATIENT
Start: 2018-03-29 | End: 2018-03-29 | Stop reason: HOSPADM

## 2018-03-29 RX ORDER — COVID-19 ANTIGEN TEST
KIT MISCELLANEOUS
COMMUNITY
End: 2019-01-23 | Stop reason: ALTCHOICE

## 2018-03-29 RX ORDER — PROPOFOL 10 MG/ML
INJECTION, EMULSION INTRAVENOUS PRN
Status: DISCONTINUED | OUTPATIENT
Start: 2018-03-29 | End: 2018-03-29 | Stop reason: SDUPTHER

## 2018-03-29 RX ADMIN — SODIUM CHLORIDE, PRESERVATIVE FREE 10 ML: 5 INJECTION INTRAVENOUS at 09:20

## 2018-03-29 RX ADMIN — PROPOFOL 100 MG: 10 INJECTION, EMULSION INTRAVENOUS at 09:13

## 2018-03-29 RX ADMIN — SODIUM CHLORIDE, PRESERVATIVE FREE 20 ML: 5 INJECTION INTRAVENOUS at 09:14

## 2018-03-29 RX ADMIN — PROPOFOL 50 MG: 10 INJECTION, EMULSION INTRAVENOUS at 09:24

## 2018-03-29 RX ADMIN — PROPOFOL 50 MG: 10 INJECTION, EMULSION INTRAVENOUS at 09:20

## 2018-03-29 RX ADMIN — PROPOFOL 50 MG: 10 INJECTION, EMULSION INTRAVENOUS at 09:16

## 2018-03-29 ASSESSMENT — PAIN - FUNCTIONAL ASSESSMENT: PAIN_FUNCTIONAL_ASSESSMENT: 0-10

## 2018-03-29 NOTE — ANESTHESIA PRE PROCEDURE
Route Frequency Provider Last Rate Last Dose    propofol injection    PRN Wilfredo Steen CRNA   50 mg at 03/29/18 8982       Allergies:  No Known Allergies    Problem List:    Patient Active Problem List   Diagnosis Code    Hyperlipidemia E78.5    Hypertension I10    Iron deficiency E61.1    Osteoarthritis of spine with radiculopathy, lumbar region M47.26    DDD (degenerative disc disease), lumbar M51.36    Type 2 diabetes mellitus without complication, without long-term current use of insulin ([facility] [address] E11.9       Past Medical History:        Diagnosis Date    Cancer ([facility] [address]     Diabetes mellitus ([facility] [address]     Hyperlipidemia     Hypertension        Past Surgical History:        Procedure Laterality Date    TUMOR REMOVAL  1994       Social History:    Social History   Substance Use Topics    Smoking status: Never Smoker    Smokeless tobacco: Never Used    Alcohol use No                                Counseling given: Not Answered      Vital Signs (Current):   Vitals:    03/29/18 0836   BP: (!) 153/82   Pulse: 97   Resp: 18   Temp: 36.8 °C (98.2 °F)   TempSrc: Temporal   SpO2: 96%   Weight: 187 lb (84.8 kg)   Height: 5' 10\" (1.778 m)                                              BP Readings from Last 3 Encounters:   03/29/18 (!) 153/82   03/29/18 (!) 163/78   02/20/18 118/70       NPO Status: Time of last liquid consumption: 2200                        Time of last solid consumption: 1600                        Date of last liquid consumption: 03/28/18                        Date of last solid food consumption: 03/27/18    BMI:   Wt Readings from Last 3 Encounters:   03/29/18 187 lb (84.8 kg)   02/20/18 192 lb (87.1 kg)   11/17/17 191 lb (86.6 kg)     Body mass index is 26.83 kg/m².     CBC:   Lab Results   Component Value Date    WBC 5.0 08/03/2017    RBC 4.37 08/03/2017    HGB 13.4 08/03/2017    HCT 40.2 08/03/2017    MCV 92.1 08/03/2017    RDW 13.8 08/03/2017     08/03/2017       CMP:   Lab

## 2018-04-13 ENCOUNTER — TELEPHONE (OUTPATIENT)
Dept: FAMILY MEDICINE CLINIC | Age: 63
End: 2018-04-13

## 2018-04-13 DIAGNOSIS — E61.1 IRON DEFICIENCY: ICD-10-CM

## 2018-04-13 DIAGNOSIS — E78.2 MIXED HYPERLIPIDEMIA: Primary | ICD-10-CM

## 2018-04-13 DIAGNOSIS — E11.9 TYPE 2 DIABETES MELLITUS WITHOUT COMPLICATION, WITHOUT LONG-TERM CURRENT USE OF INSULIN (HCC): ICD-10-CM

## 2018-04-13 DIAGNOSIS — I10 ESSENTIAL HYPERTENSION: ICD-10-CM

## 2018-04-16 DIAGNOSIS — I10 ESSENTIAL HYPERTENSION: ICD-10-CM

## 2018-04-16 DIAGNOSIS — E11.9 TYPE 2 DIABETES MELLITUS WITHOUT COMPLICATION, WITHOUT LONG-TERM CURRENT USE OF INSULIN (HCC): ICD-10-CM

## 2018-04-16 DIAGNOSIS — E61.1 IRON DEFICIENCY: ICD-10-CM

## 2018-04-16 DIAGNOSIS — E78.2 MIXED HYPERLIPIDEMIA: ICD-10-CM

## 2018-04-16 LAB
ALBUMIN SERPL-MCNC: 4.3 G/DL (ref 3.9–4.9)
ALP BLD-CCNC: 61 U/L (ref 35–104)
ALT SERPL-CCNC: 29 U/L (ref 0–41)
ANION GAP SERPL CALCULATED.3IONS-SCNC: 14 MEQ/L (ref 7–13)
AST SERPL-CCNC: 21 U/L (ref 0–40)
BASOPHILS ABSOLUTE: 0.1 K/UL (ref 0–0.2)
BASOPHILS RELATIVE PERCENT: 0.9 %
BILIRUB SERPL-MCNC: 0.4 MG/DL (ref 0–1.2)
BUN BLDV-MCNC: 21 MG/DL (ref 8–23)
CALCIUM SERPL-MCNC: 10 MG/DL (ref 8.6–10.2)
CHLORIDE BLD-SCNC: 99 MEQ/L (ref 98–107)
CHOLESTEROL, TOTAL: 167 MG/DL (ref 0–199)
CO2: 24 MEQ/L (ref 22–29)
CREAT SERPL-MCNC: 1.21 MG/DL (ref 0.7–1.2)
CREATININE URINE: 269.9 MG/DL
EOSINOPHILS ABSOLUTE: 0.2 K/UL (ref 0–0.7)
EOSINOPHILS RELATIVE PERCENT: 2.7 %
GFR AFRICAN AMERICAN: >60
GFR NON-AFRICAN AMERICAN: >60
GLOBULIN: 2.7 G/DL (ref 2.3–3.5)
GLUCOSE BLD-MCNC: 223 MG/DL (ref 74–109)
HBA1C MFR BLD: 9.1 % (ref 4.8–5.9)
HCT VFR BLD CALC: 42.1 % (ref 42–52)
HDLC SERPL-MCNC: 54 MG/DL (ref 40–59)
HEMOGLOBIN: 13.9 G/DL (ref 14–18)
LDL CHOLESTEROL CALCULATED: 86 MG/DL (ref 0–129)
LYMPHOCYTES ABSOLUTE: 1.4 K/UL (ref 1–4.8)
LYMPHOCYTES RELATIVE PERCENT: 21.6 %
MCH RBC QN AUTO: 30.9 PG (ref 27–31.3)
MCHC RBC AUTO-ENTMCNC: 33.1 % (ref 33–37)
MCV RBC AUTO: 93.3 FL (ref 80–100)
MICROALBUMIN UR-MCNC: 1.7 MG/DL
MICROALBUMIN/CREAT UR-RTO: 6.3 MG/G (ref 0–30)
MONOCYTES ABSOLUTE: 0.6 K/UL (ref 0.2–0.8)
MONOCYTES RELATIVE PERCENT: 9.5 %
NEUTROPHILS ABSOLUTE: 4.2 K/UL (ref 1.4–6.5)
NEUTROPHILS RELATIVE PERCENT: 65.3 %
PDW BLD-RTO: 14 % (ref 11.5–14.5)
PLATELET # BLD: 186 K/UL (ref 130–400)
POTASSIUM SERPL-SCNC: 5 MEQ/L (ref 3.5–5.1)
RBC # BLD: 4.51 M/UL (ref 4.7–6.1)
SODIUM BLD-SCNC: 137 MEQ/L (ref 132–144)
TOTAL PROTEIN: 7 G/DL (ref 6.4–8.1)
TRIGL SERPL-MCNC: 134 MG/DL (ref 0–200)
TSH SERPL DL<=0.05 MIU/L-ACNC: 2.43 UIU/ML (ref 0.27–4.2)
WBC # BLD: 6.4 K/UL (ref 4.8–10.8)

## 2018-04-16 RX ORDER — ASPIRIN 81 MG/1
TABLET ORAL
Qty: 30 TABLET | Refills: 5 | Status: SHIPPED | OUTPATIENT
Start: 2018-04-16 | End: 2019-06-10 | Stop reason: SDUPTHER

## 2018-04-16 RX ORDER — LISINOPRIL 10 MG/1
TABLET ORAL
Qty: 30 TABLET | Refills: 5 | Status: SHIPPED | OUTPATIENT
Start: 2018-04-16 | End: 2018-07-05 | Stop reason: SDUPTHER

## 2018-04-16 RX ORDER — SIMVASTATIN 10 MG
TABLET ORAL
Qty: 30 TABLET | Refills: 5 | Status: SHIPPED | OUTPATIENT
Start: 2018-04-16 | End: 2018-07-05 | Stop reason: SDUPTHER

## 2018-04-23 ENCOUNTER — OFFICE VISIT (OUTPATIENT)
Dept: FAMILY MEDICINE CLINIC | Age: 63
End: 2018-04-23
Payer: MEDICAID

## 2018-04-23 VITALS
RESPIRATION RATE: 16 BRPM | WEIGHT: 190 LBS | OXYGEN SATURATION: 97 % | SYSTOLIC BLOOD PRESSURE: 124 MMHG | HEART RATE: 91 BPM | HEIGHT: 69 IN | TEMPERATURE: 97.4 F | DIASTOLIC BLOOD PRESSURE: 74 MMHG | BODY MASS INDEX: 28.14 KG/M2

## 2018-04-23 DIAGNOSIS — I10 ESSENTIAL HYPERTENSION: Primary | ICD-10-CM

## 2018-04-23 DIAGNOSIS — E11.9 TYPE 2 DIABETES MELLITUS WITHOUT COMPLICATION, WITHOUT LONG-TERM CURRENT USE OF INSULIN (HCC): ICD-10-CM

## 2018-04-23 DIAGNOSIS — Z12.5 SPECIAL SCREENING EXAMINATION FOR NEOPLASM OF PROSTATE: ICD-10-CM

## 2018-04-23 DIAGNOSIS — E78.2 MIXED HYPERLIPIDEMIA: ICD-10-CM

## 2018-04-23 PROBLEM — E61.1 IRON DEFICIENCY: Status: RESOLVED | Noted: 2017-01-03 | Resolved: 2018-04-23

## 2018-04-23 PROCEDURE — G8417 CALC BMI ABV UP PARAM F/U: HCPCS | Performed by: FAMILY MEDICINE

## 2018-04-23 PROCEDURE — 3046F HEMOGLOBIN A1C LEVEL >9.0%: CPT | Performed by: FAMILY MEDICINE

## 2018-04-23 PROCEDURE — 2022F DILAT RTA XM EVC RTNOPTHY: CPT | Performed by: FAMILY MEDICINE

## 2018-04-23 PROCEDURE — 99214 OFFICE O/P EST MOD 30 MIN: CPT | Performed by: FAMILY MEDICINE

## 2018-04-23 PROCEDURE — G8427 DOCREV CUR MEDS BY ELIG CLIN: HCPCS | Performed by: FAMILY MEDICINE

## 2018-04-23 PROCEDURE — 1036F TOBACCO NON-USER: CPT | Performed by: FAMILY MEDICINE

## 2018-04-23 PROCEDURE — 3017F COLORECTAL CA SCREEN DOC REV: CPT | Performed by: FAMILY MEDICINE

## 2018-04-23 RX ORDER — ALOGLIPTIN 25 MG/1
25 TABLET, FILM COATED ORAL DAILY
Qty: 30 TABLET | Refills: 11 | Status: SHIPPED | OUTPATIENT
Start: 2018-04-23 | End: 2018-05-22 | Stop reason: SDUPTHER

## 2018-04-23 RX ORDER — BLOOD-GLUCOSE METER
EACH MISCELLANEOUS
Refills: 0 | COMMUNITY
Start: 2018-02-20 | End: 2018-04-23 | Stop reason: ALTCHOICE

## 2018-04-23 ASSESSMENT — ENCOUNTER SYMPTOMS
RESPIRATORY NEGATIVE: 1
ALLERGIC/IMMUNOLOGIC NEGATIVE: 1
GASTROINTESTINAL NEGATIVE: 1
EYES NEGATIVE: 1

## 2018-05-03 DIAGNOSIS — E11.9 TYPE 2 DIABETES MELLITUS WITHOUT COMPLICATION, WITHOUT LONG-TERM CURRENT USE OF INSULIN (HCC): Primary | ICD-10-CM

## 2018-05-21 DIAGNOSIS — E11.9 TYPE 2 DIABETES MELLITUS WITHOUT COMPLICATION, WITHOUT LONG-TERM CURRENT USE OF INSULIN (HCC): ICD-10-CM

## 2018-05-22 RX ORDER — ALOGLIPTIN 25 MG/1
25 TABLET, FILM COATED ORAL DAILY
Qty: 30 TABLET | Refills: 5 | Status: SHIPPED | OUTPATIENT
Start: 2018-05-22 | End: 2018-06-12 | Stop reason: CLARIF

## 2018-06-11 ENCOUNTER — TELEPHONE (OUTPATIENT)
Dept: FAMILY MEDICINE CLINIC | Age: 63
End: 2018-06-11

## 2018-07-05 DIAGNOSIS — E11.9 TYPE 2 DIABETES MELLITUS WITHOUT COMPLICATION, WITHOUT LONG-TERM CURRENT USE OF INSULIN (HCC): ICD-10-CM

## 2018-07-05 DIAGNOSIS — I10 ESSENTIAL HYPERTENSION: ICD-10-CM

## 2018-07-05 DIAGNOSIS — E78.2 MIXED HYPERLIPIDEMIA: ICD-10-CM

## 2018-07-05 RX ORDER — SIMVASTATIN 10 MG
TABLET ORAL
Qty: 90 TABLET | Refills: 3 | Status: SHIPPED | OUTPATIENT
Start: 2018-07-05 | End: 2019-01-23 | Stop reason: SDUPTHER

## 2018-07-05 RX ORDER — LISINOPRIL 10 MG/1
TABLET ORAL
Qty: 90 TABLET | Refills: 3 | Status: SHIPPED | OUTPATIENT
Start: 2018-07-05 | End: 2019-01-23 | Stop reason: SDUPTHER

## 2018-07-13 DIAGNOSIS — I10 ESSENTIAL HYPERTENSION: ICD-10-CM

## 2018-07-13 DIAGNOSIS — E78.2 MIXED HYPERLIPIDEMIA: Primary | ICD-10-CM

## 2018-07-16 DIAGNOSIS — E78.2 MIXED HYPERLIPIDEMIA: ICD-10-CM

## 2018-07-16 DIAGNOSIS — I10 ESSENTIAL HYPERTENSION: ICD-10-CM

## 2018-07-16 DIAGNOSIS — Z12.5 SPECIAL SCREENING EXAMINATION FOR NEOPLASM OF PROSTATE: ICD-10-CM

## 2018-07-16 DIAGNOSIS — E11.9 TYPE 2 DIABETES MELLITUS WITHOUT COMPLICATION, WITHOUT LONG-TERM CURRENT USE OF INSULIN (HCC): ICD-10-CM

## 2018-07-16 LAB
ALBUMIN SERPL-MCNC: 4.1 G/DL (ref 3.9–4.9)
ALP BLD-CCNC: 54 U/L (ref 35–104)
ALT SERPL-CCNC: 28 U/L (ref 0–41)
ANION GAP SERPL CALCULATED.3IONS-SCNC: 14 MEQ/L (ref 7–13)
AST SERPL-CCNC: 22 U/L (ref 0–40)
BASOPHILS ABSOLUTE: 0.1 K/UL (ref 0–0.2)
BASOPHILS RELATIVE PERCENT: 0.9 %
BILIRUB SERPL-MCNC: 0.4 MG/DL (ref 0–1.2)
BUN BLDV-MCNC: 15 MG/DL (ref 8–23)
CALCIUM SERPL-MCNC: 9.3 MG/DL (ref 8.6–10.2)
CHLORIDE BLD-SCNC: 103 MEQ/L (ref 98–107)
CHOLESTEROL, TOTAL: 159 MG/DL (ref 0–199)
CO2: 22 MEQ/L (ref 22–29)
CREAT SERPL-MCNC: 1.09 MG/DL (ref 0.7–1.2)
CREATININE URINE: 295 MG/DL
EOSINOPHILS ABSOLUTE: 0.2 K/UL (ref 0–0.7)
EOSINOPHILS RELATIVE PERCENT: 2.6 %
GFR AFRICAN AMERICAN: >60
GFR NON-AFRICAN AMERICAN: >60
GLOBULIN: 3.3 G/DL (ref 2.3–3.5)
GLUCOSE BLD-MCNC: 169 MG/DL (ref 74–109)
HBA1C MFR BLD: 7.6 % (ref 4.8–5.9)
HCT VFR BLD CALC: 42.1 % (ref 42–52)
HDLC SERPL-MCNC: 51 MG/DL (ref 40–59)
HEMOGLOBIN: 13.9 G/DL (ref 14–18)
LDL CHOLESTEROL CALCULATED: 87 MG/DL (ref 0–129)
LYMPHOCYTES ABSOLUTE: 1.4 K/UL (ref 1–4.8)
LYMPHOCYTES RELATIVE PERCENT: 22 %
MCH RBC QN AUTO: 30.9 PG (ref 27–31.3)
MCHC RBC AUTO-ENTMCNC: 33 % (ref 33–37)
MCV RBC AUTO: 93.6 FL (ref 80–100)
MICROALBUMIN UR-MCNC: 2.2 MG/DL
MICROALBUMIN/CREAT UR-RTO: 7.5 MG/G (ref 0–30)
MONOCYTES ABSOLUTE: 0.5 K/UL (ref 0.2–0.8)
MONOCYTES RELATIVE PERCENT: 8.4 %
NEUTROPHILS ABSOLUTE: 4.1 K/UL (ref 1.4–6.5)
NEUTROPHILS RELATIVE PERCENT: 66.1 %
PDW BLD-RTO: 14 % (ref 11.5–14.5)
PLATELET # BLD: 175 K/UL (ref 130–400)
POTASSIUM SERPL-SCNC: 4.5 MEQ/L (ref 3.5–5.1)
PROSTATE SPECIFIC ANTIGEN: 0.73 NG/ML (ref 0–5.4)
RBC # BLD: 4.5 M/UL (ref 4.7–6.1)
SODIUM BLD-SCNC: 139 MEQ/L (ref 132–144)
TOTAL PROTEIN: 7.4 G/DL (ref 6.4–8.1)
TRIGL SERPL-MCNC: 106 MG/DL (ref 0–200)
TSH SERPL DL<=0.05 MIU/L-ACNC: 2.57 UIU/ML (ref 0.27–4.2)
WBC # BLD: 6.2 K/UL (ref 4.8–10.8)

## 2018-07-23 ENCOUNTER — OFFICE VISIT (OUTPATIENT)
Dept: FAMILY MEDICINE CLINIC | Age: 63
End: 2018-07-23
Payer: MEDICARE

## 2018-07-23 VITALS
OXYGEN SATURATION: 98 % | WEIGHT: 182 LBS | TEMPERATURE: 97.3 F | RESPIRATION RATE: 12 BRPM | HEIGHT: 69 IN | SYSTOLIC BLOOD PRESSURE: 120 MMHG | DIASTOLIC BLOOD PRESSURE: 80 MMHG | HEART RATE: 83 BPM | BODY MASS INDEX: 26.96 KG/M2

## 2018-07-23 DIAGNOSIS — E11.9 TYPE 2 DIABETES MELLITUS WITHOUT COMPLICATION, WITHOUT LONG-TERM CURRENT USE OF INSULIN (HCC): Primary | ICD-10-CM

## 2018-07-23 DIAGNOSIS — E78.2 MIXED HYPERLIPIDEMIA: ICD-10-CM

## 2018-07-23 DIAGNOSIS — I10 ESSENTIAL HYPERTENSION: ICD-10-CM

## 2018-07-23 PROCEDURE — G8417 CALC BMI ABV UP PARAM F/U: HCPCS | Performed by: FAMILY MEDICINE

## 2018-07-23 PROCEDURE — 4040F PNEUMOC VAC/ADMIN/RCVD: CPT | Performed by: FAMILY MEDICINE

## 2018-07-23 PROCEDURE — 1036F TOBACCO NON-USER: CPT | Performed by: FAMILY MEDICINE

## 2018-07-23 PROCEDURE — G8427 DOCREV CUR MEDS BY ELIG CLIN: HCPCS | Performed by: FAMILY MEDICINE

## 2018-07-23 PROCEDURE — 1123F ACP DISCUSS/DSCN MKR DOCD: CPT | Performed by: FAMILY MEDICINE

## 2018-07-23 PROCEDURE — 3017F COLORECTAL CA SCREEN DOC REV: CPT | Performed by: FAMILY MEDICINE

## 2018-07-23 PROCEDURE — 3045F PR MOST RECENT HEMOGLOBIN A1C LEVEL 7.0-9.0%: CPT | Performed by: FAMILY MEDICINE

## 2018-07-23 PROCEDURE — 2022F DILAT RTA XM EVC RTNOPTHY: CPT | Performed by: FAMILY MEDICINE

## 2018-07-23 PROCEDURE — 1101F PT FALLS ASSESS-DOCD LE1/YR: CPT | Performed by: FAMILY MEDICINE

## 2018-07-23 PROCEDURE — 99214 OFFICE O/P EST MOD 30 MIN: CPT | Performed by: FAMILY MEDICINE

## 2018-07-23 ASSESSMENT — PATIENT HEALTH QUESTIONNAIRE - PHQ9
SUM OF ALL RESPONSES TO PHQ QUESTIONS 1-9: 0
1. LITTLE INTEREST OR PLEASURE IN DOING THINGS: 0
2. FEELING DOWN, DEPRESSED OR HOPELESS: 0
SUM OF ALL RESPONSES TO PHQ9 QUESTIONS 1 & 2: 0

## 2018-07-23 ASSESSMENT — ENCOUNTER SYMPTOMS
RESPIRATORY NEGATIVE: 1
EYES NEGATIVE: 1
GASTROINTESTINAL NEGATIVE: 1
BLURRED VISION: 0
SHORTNESS OF BREATH: 0
ALLERGIC/IMMUNOLOGIC NEGATIVE: 1
VISUAL CHANGE: 0

## 2018-07-23 NOTE — PROGRESS NOTES
negatives include no chest pain or shortness of breath. Review of Systems   Constitutional: Negative. Negative for fatigue and weight loss. HENT: Negative. Eyes: Negative. Negative for blurred vision. Respiratory: Negative. Negative for shortness of breath. Cardiovascular: Negative. Negative for chest pain and PND. Gastrointestinal: Negative. Endocrine: Negative. Negative for polydipsia, polyphagia and polyuria. Genitourinary: Negative. Musculoskeletal: Negative. Skin: Negative. Allergic/Immunologic: Negative. Neurological: Negative. Negative for weakness. Hematological: Negative. Psychiatric/Behavioral: Negative. Past Medical History:   Diagnosis Date    [facility]     [facility] mellitus ([facility]     Hyperlipidemia     Hypertension      Past Surgical History:   Procedure Laterality Date    VA COLON CA SCRN NOT HI RSK IND N/A 3/29/2018    COLONOSCOPY performed by Mary Ruvalcaba MD at [place] [address]     Social History     Social History    Marital status: Single     Spouse name: N/A    Number of children: N/A    Years of education: N/A     Occupational History    Not on file.      Social History Main Topics    Smoking status: Never Smoker    Smokeless tobacco: Never Used    Alcohol use No    Drug use: No    Sexual activity: Not on file     Other Topics Concern    Not on file     Social History Narrative    No narrative on file     Family History   Problem Relation Age of Onset    Cancer Mother         ovarian     Other Father      No Known Allergies  Current Outpatient Prescriptions on File Prior to Visit   Medication Sig Dispense Refill    metFORMIN (GLUCOPHAGE) 500 MG tablet Take 2 tablets by mouth 2 times daily (with meals) 360 tablet 3    lisinopril (PRINIVIL;ZESTRIL) 10 MG tablet take 1 tablet by mouth once daily 90 tablet 3    simvastatin (ZOCOR) 10 MG tablet take 1 tablet by mouth once daily 90 tablet 3    SITagliptin (JANUVIA) 100 MG tablet Take 1 tablet by mouth daily 90 tablet 3    aspirin (ASPIRIN LOW DOSE) 81 MG EC tablet take 1 tablet by mouth once daily 30 tablet 5    Naproxen Sodium (ALEVE) 220 MG CAPS Take by mouth       No current facility-administered medications on file prior to visit. Objective    Vitals:    07/23/18 0926   BP: 120/80   Pulse: 83   Resp: 12   Temp: 97.3 °F (36.3 °C)   TempSrc: Tympanic   SpO2: 98%   Weight: 182 lb (82.6 kg)   Height: 5' 8.5\" (1.74 m)     Physical Exam   Constitutional: Vital signs are normal. He appears well-developed. HENT:   Head: Normocephalic and atraumatic. Right Ear: Tympanic membrane, external ear and ear canal normal. Tympanic membrane is not injected. No middle ear effusion. Left Ear: Tympanic membrane, external ear and ear canal normal. Tympanic membrane is not injected. No middle ear effusion. Nose: Nose normal. No mucosal edema or rhinorrhea. Right sinus exhibits no maxillary sinus tenderness and no frontal sinus tenderness. Left sinus exhibits no maxillary sinus tenderness and no frontal sinus tenderness. Eyes: Conjunctivae, EOM and lids are normal. Pupils are equal, round, and reactive to light. Neck: Normal range of motion. Neck supple. No JVD present. No muscular tenderness present. No neck rigidity. No tracheal deviation present. No thyroid mass and no thyromegaly present. Cardiovascular: Normal rate, regular rhythm and intact distal pulses. Pulmonary/Chest: Effort normal. He has no decreased breath sounds. He has no wheezes. He has no rhonchi. He has no rales. He exhibits no tenderness and no deformity. Abdominal: Soft. Normal appearance and bowel sounds are normal. He exhibits no distension and no mass. There is no splenomegaly or hepatomegaly. There is no tenderness. There is no rigidity, no rebound and no guarding. No hernia. Musculoskeletal: Normal range of motion.         Right knee: Normal.        Left knee: Normal. Cervical back: Normal.        Thoracic back: Normal.        Lumbar back: Normal.        Lymphadenopathy:     He has no cervical adenopathy. Neurological: He is alert. He has normal strength. He displays no atrophy and no tremor. No cranial nerve deficit or sensory deficit. Coordination and gait normal.   Skin: Skin is warm, dry and intact. No rash noted. Psychiatric: He has a normal mood and affect. His speech is normal and behavior is normal. Judgment and thought content normal. Cognition and memory are normal.            Assessment & Plan    Diagnosis Orders   1. Type 2 diabetes mellitus without complication, without long-term current use of insulin (HCC)  Comprehensive Metabolic Panel    Hemoglobin A1C    Lipid Panel    Magnesium    Microalbumin / Creatinine Urine Ratio    TSH without Reflex   2. Mixed hyperlipidemia  Comprehensive Metabolic Panel    Lipid Panel    TSH without Reflex   3.  Essential hypertension  CBC Auto Differential    Comprehensive Metabolic Panel    Lipid Panel    Magnesium    Microalbumin / Creatinine Urine Ratio    TSH without Reflex     Orders Placed This Encounter   Procedures    CBC Auto Differential     Standing Status:   Future     Standing Expiration Date:   7/23/2019    Comprehensive Metabolic Panel     Standing Status:   Future     Standing Expiration Date:   7/23/2019    Hemoglobin A1C     Standing Status:   Future     Standing Expiration Date:   7/23/2019    Lipid Panel     Standing Status:   Future     Standing Expiration Date:   7/23/2019     Order Specific Question:   Is Patient Fasting?/# of Hours     Answer:   8    Magnesium     Standing Status:   Future     Standing Expiration Date:   7/23/2019    Microalbumin / Creatinine Urine Ratio     Standing Status:   Future     Standing Expiration Date:   7/23/2019    TSH without Reflex     Standing Status:   Future     Standing Expiration Date:   7/23/2019     No orders of the defined types were placed in this encounter. There are no discontinued medications. Counseling given: Yes      Return in about 6 months (around 1/23/2019).     Giuseppe Leblanc,

## 2018-09-11 LAB — DIABETIC RETINOPATHY: NEGATIVE

## 2019-01-17 DIAGNOSIS — E78.2 MIXED HYPERLIPIDEMIA: ICD-10-CM

## 2019-01-17 DIAGNOSIS — E11.9 TYPE 2 DIABETES MELLITUS WITHOUT COMPLICATION, WITHOUT LONG-TERM CURRENT USE OF INSULIN (HCC): ICD-10-CM

## 2019-01-17 DIAGNOSIS — I10 ESSENTIAL HYPERTENSION: ICD-10-CM

## 2019-01-17 LAB
ALBUMIN SERPL-MCNC: 4.5 G/DL (ref 3.9–4.9)
ALP BLD-CCNC: 57 U/L (ref 35–104)
ALT SERPL-CCNC: 27 U/L (ref 0–41)
ANION GAP SERPL CALCULATED.3IONS-SCNC: 15 MEQ/L (ref 7–13)
AST SERPL-CCNC: 23 U/L (ref 0–40)
BASOPHILS ABSOLUTE: 0 K/UL (ref 0–0.2)
BASOPHILS RELATIVE PERCENT: 0.6 %
BILIRUB SERPL-MCNC: 0.3 MG/DL (ref 0–1.2)
BUN BLDV-MCNC: 19 MG/DL (ref 8–23)
CALCIUM SERPL-MCNC: 10.1 MG/DL (ref 8.6–10.2)
CHLORIDE BLD-SCNC: 103 MEQ/L (ref 98–107)
CHOLESTEROL, TOTAL: 184 MG/DL (ref 0–199)
CO2: 23 MEQ/L (ref 22–29)
CREAT SERPL-MCNC: 1.3 MG/DL (ref 0.7–1.2)
CREATININE URINE: 371.6 MG/DL
EOSINOPHILS ABSOLUTE: 0.1 K/UL (ref 0–0.7)
EOSINOPHILS RELATIVE PERCENT: 2.7 %
GFR AFRICAN AMERICAN: >60
GFR NON-AFRICAN AMERICAN: 55.3
GLOBULIN: 2.9 G/DL (ref 2.3–3.5)
GLUCOSE BLD-MCNC: 185 MG/DL (ref 74–109)
HBA1C MFR BLD: 7.9 % (ref 4.8–5.9)
HCT VFR BLD CALC: 40.3 % (ref 42–52)
HDLC SERPL-MCNC: 59 MG/DL (ref 40–59)
HEMOGLOBIN: 13.7 G/DL (ref 14–18)
LDL CHOLESTEROL CALCULATED: 104 MG/DL (ref 0–129)
LYMPHOCYTES ABSOLUTE: 1.2 K/UL (ref 1–4.8)
LYMPHOCYTES RELATIVE PERCENT: 20.8 %
MAGNESIUM: 1.9 MG/DL (ref 1.7–2.3)
MCH RBC QN AUTO: 31.6 PG (ref 27–31.3)
MCHC RBC AUTO-ENTMCNC: 34 % (ref 33–37)
MCV RBC AUTO: 92.9 FL (ref 80–100)
MICROALBUMIN UR-MCNC: <1.2 MG/DL
MICROALBUMIN/CREAT UR-RTO: NORMAL MG/G (ref 0–30)
MONOCYTES ABSOLUTE: 0.5 K/UL (ref 0.2–0.8)
MONOCYTES RELATIVE PERCENT: 9.1 %
NEUTROPHILS ABSOLUTE: 3.7 K/UL (ref 1.4–6.5)
NEUTROPHILS RELATIVE PERCENT: 66.8 %
PDW BLD-RTO: 13.5 % (ref 11.5–14.5)
PLATELET # BLD: 192 K/UL (ref 130–400)
POTASSIUM SERPL-SCNC: 4.8 MEQ/L (ref 3.5–5.1)
RBC # BLD: 4.34 M/UL (ref 4.7–6.1)
SODIUM BLD-SCNC: 141 MEQ/L (ref 132–144)
TOTAL PROTEIN: 7.4 G/DL (ref 6.4–8.1)
TRIGL SERPL-MCNC: 106 MG/DL (ref 0–200)
TSH SERPL DL<=0.05 MIU/L-ACNC: 2.02 UIU/ML (ref 0.27–4.2)
WBC # BLD: 5.6 K/UL (ref 4.8–10.8)

## 2019-01-23 ENCOUNTER — OFFICE VISIT (OUTPATIENT)
Dept: FAMILY MEDICINE CLINIC | Age: 64
End: 2019-01-23
Payer: MEDICARE

## 2019-01-23 VITALS
DIASTOLIC BLOOD PRESSURE: 78 MMHG | BODY MASS INDEX: 27.19 KG/M2 | HEIGHT: 69 IN | SYSTOLIC BLOOD PRESSURE: 128 MMHG | HEART RATE: 82 BPM | RESPIRATION RATE: 15 BRPM | OXYGEN SATURATION: 98 % | TEMPERATURE: 98.1 F | WEIGHT: 183.6 LBS

## 2019-01-23 DIAGNOSIS — E78.2 MIXED HYPERLIPIDEMIA: ICD-10-CM

## 2019-01-23 DIAGNOSIS — E11.9 TYPE 2 DIABETES MELLITUS WITHOUT COMPLICATION, WITHOUT LONG-TERM CURRENT USE OF INSULIN (HCC): Primary | ICD-10-CM

## 2019-01-23 DIAGNOSIS — Z12.5 SPECIAL SCREENING EXAMINATION FOR NEOPLASM OF PROSTATE: ICD-10-CM

## 2019-01-23 DIAGNOSIS — Z23 NEED FOR PROPHYLACTIC VACCINATION AGAINST STREPTOCOCCUS PNEUMONIAE (PNEUMOCOCCUS): ICD-10-CM

## 2019-01-23 DIAGNOSIS — I10 ESSENTIAL HYPERTENSION: ICD-10-CM

## 2019-01-23 PROCEDURE — G0009 ADMIN PNEUMOCOCCAL VACCINE: HCPCS | Performed by: FAMILY MEDICINE

## 2019-01-23 PROCEDURE — 90670 PCV13 VACCINE IM: CPT | Performed by: FAMILY MEDICINE

## 2019-01-23 PROCEDURE — 99214 OFFICE O/P EST MOD 30 MIN: CPT | Performed by: FAMILY MEDICINE

## 2019-01-23 RX ORDER — SIMVASTATIN 10 MG
TABLET ORAL
Qty: 90 TABLET | Refills: 3 | Status: SHIPPED | OUTPATIENT
Start: 2019-01-23 | End: 2019-06-10 | Stop reason: SDUPTHER

## 2019-01-23 RX ORDER — LISINOPRIL 10 MG/1
TABLET ORAL
Qty: 90 TABLET | Refills: 3 | Status: SHIPPED | OUTPATIENT
Start: 2019-01-23 | End: 2019-06-10 | Stop reason: SDUPTHER

## 2019-01-23 ASSESSMENT — ENCOUNTER SYMPTOMS
ALLERGIC/IMMUNOLOGIC NEGATIVE: 1
RESPIRATORY NEGATIVE: 1
EYES NEGATIVE: 1
GASTROINTESTINAL NEGATIVE: 1

## 2019-02-28 ENCOUNTER — TELEPHONE (OUTPATIENT)
Dept: FAMILY MEDICINE CLINIC | Age: 64
End: 2019-02-28

## 2019-03-02 ENCOUNTER — APPOINTMENT (OUTPATIENT)
Dept: GENERAL RADIOLOGY | Age: 64
End: 2019-03-02
Payer: MEDICARE

## 2019-03-02 ENCOUNTER — HOSPITAL ENCOUNTER (OUTPATIENT)
Age: 64
Setting detail: OBSERVATION
Discharge: HOME | End: 2019-03-03
Attending: EMERGENCY MEDICINE | Admitting: INTERNAL MEDICINE
Payer: MEDICARE

## 2019-03-02 DIAGNOSIS — R77.8 ELEVATED TROPONIN I LEVEL: ICD-10-CM

## 2019-03-02 DIAGNOSIS — R07.9 CHEST PAIN, UNSPECIFIED TYPE: Primary | ICD-10-CM

## 2019-03-02 LAB
ALBUMIN SERPL-MCNC: 4.5 G/DL (ref 3.5–4.6)
ALP BLD-CCNC: 55 U/L (ref 35–104)
ALT SERPL-CCNC: 24 U/L (ref 0–41)
ANION GAP SERPL CALCULATED.3IONS-SCNC: 15 MEQ/L (ref 9–15)
AST SERPL-CCNC: 22 U/L (ref 0–40)
BASOPHILS ABSOLUTE: 0 K/UL (ref 0–0.2)
BASOPHILS RELATIVE PERCENT: 0.6 %
BILIRUB SERPL-MCNC: 0.3 MG/DL (ref 0.2–0.7)
BUN BLDV-MCNC: 14 MG/DL (ref 8–23)
CALCIUM SERPL-MCNC: 9.9 MG/DL (ref 8.5–9.9)
CHLORIDE BLD-SCNC: 99 MEQ/L (ref 95–107)
CO2: 26 MEQ/L (ref 20–31)
CREAT SERPL-MCNC: 1.38 MG/DL (ref 0.7–1.2)
EKG ATRIAL RATE: 79 BPM
EKG ATRIAL RATE: 88 BPM
EKG P AXIS: 67 DEGREES
EKG P AXIS: 68 DEGREES
EKG P-R INTERVAL: 154 MS
EKG P-R INTERVAL: 154 MS
EKG Q-T INTERVAL: 352 MS
EKG Q-T INTERVAL: 366 MS
EKG QRS DURATION: 96 MS
EKG QRS DURATION: 96 MS
EKG QTC CALCULATION (BAZETT): 419 MS
EKG QTC CALCULATION (BAZETT): 425 MS
EKG R AXIS: 20 DEGREES
EKG R AXIS: 22 DEGREES
EKG T AXIS: 54 DEGREES
EKG T AXIS: 56 DEGREES
EKG VENTRICULAR RATE: 79 BPM
EKG VENTRICULAR RATE: 88 BPM
EOSINOPHILS ABSOLUTE: 0.1 K/UL (ref 0–0.7)
EOSINOPHILS RELATIVE PERCENT: 2 %
GFR AFRICAN AMERICAN: >60
GFR NON-AFRICAN AMERICAN: 51.6
GLOBULIN: 3.1 G/DL (ref 2.3–3.5)
GLUCOSE BLD-MCNC: 163 MG/DL (ref 70–99)
HCT VFR BLD CALC: 42.4 % (ref 42–52)
HEMOGLOBIN: 14.2 G/DL (ref 14–18)
LYMPHOCYTES ABSOLUTE: 1.8 K/UL (ref 1–4.8)
LYMPHOCYTES RELATIVE PERCENT: 23.9 %
MCH RBC QN AUTO: 30.9 PG (ref 27–31.3)
MCHC RBC AUTO-ENTMCNC: 33.4 % (ref 33–37)
MCV RBC AUTO: 92.5 FL (ref 80–100)
MONOCYTES ABSOLUTE: 0.7 K/UL (ref 0.2–0.8)
MONOCYTES RELATIVE PERCENT: 9.1 %
NEUTROPHILS ABSOLUTE: 4.7 K/UL (ref 1.4–6.5)
NEUTROPHILS RELATIVE PERCENT: 64.4 %
PDW BLD-RTO: 13.9 % (ref 11.5–14.5)
PLATELET # BLD: 192 K/UL (ref 130–400)
POTASSIUM SERPL-SCNC: 4.2 MEQ/L (ref 3.4–4.9)
RBC # BLD: 4.58 M/UL (ref 4.7–6.1)
SODIUM BLD-SCNC: 140 MEQ/L (ref 135–144)
TOTAL PROTEIN: 7.6 G/DL (ref 6.3–8)
WBC # BLD: 7.3 K/UL (ref 4.8–10.8)

## 2019-03-02 PROCEDURE — 84484 ASSAY OF TROPONIN QUANT: CPT

## 2019-03-02 PROCEDURE — 80053 COMPREHEN METABOLIC PANEL: CPT

## 2019-03-02 PROCEDURE — 71045 X-RAY EXAM CHEST 1 VIEW: CPT

## 2019-03-02 PROCEDURE — 36415 COLL VENOUS BLD VENIPUNCTURE: CPT

## 2019-03-02 PROCEDURE — 6370000000 HC RX 637 (ALT 250 FOR IP): Performed by: EMERGENCY MEDICINE

## 2019-03-02 PROCEDURE — 99285 EMERGENCY DEPT VISIT HI MDM: CPT

## 2019-03-02 PROCEDURE — 93005 ELECTROCARDIOGRAM TRACING: CPT

## 2019-03-02 PROCEDURE — 85025 COMPLETE CBC W/AUTO DIFF WBC: CPT

## 2019-03-02 RX ADMIN — LIDOCAINE HYDROCHLORIDE: 20 SOLUTION ORAL; TOPICAL at 22:52

## 2019-03-02 ASSESSMENT — ENCOUNTER SYMPTOMS
SORE THROAT: 0
PHOTOPHOBIA: 0
ABDOMINAL PAIN: 1
SHORTNESS OF BREATH: 0
VOMITING: 0
ABDOMINAL DISTENTION: 0
CHEST TIGHTNESS: 0
WHEEZING: 0
COUGH: 0
EYE DISCHARGE: 0

## 2019-03-02 ASSESSMENT — PAIN DESCRIPTION - PAIN TYPE: TYPE: ACUTE PAIN

## 2019-03-02 ASSESSMENT — PAIN SCALES - GENERAL: PAINLEVEL_OUTOF10: 7

## 2019-03-02 ASSESSMENT — PAIN DESCRIPTION - DESCRIPTORS: DESCRIPTORS: PRESSURE

## 2019-03-02 ASSESSMENT — PAIN DESCRIPTION - LOCATION: LOCATION: ABDOMEN;CHEST

## 2019-03-02 ASSESSMENT — HEART SCORE: ECG: 0

## 2019-03-03 VITALS
HEART RATE: 78 BPM | SYSTOLIC BLOOD PRESSURE: 128 MMHG | HEIGHT: 70 IN | TEMPERATURE: 98.4 F | DIASTOLIC BLOOD PRESSURE: 68 MMHG | WEIGHT: 178.35 LBS | OXYGEN SATURATION: 97 % | RESPIRATION RATE: 17 BRPM | BODY MASS INDEX: 25.53 KG/M2

## 2019-03-03 PROBLEM — R07.9 CHEST PAIN: Status: ACTIVE | Noted: 2019-03-03

## 2019-03-03 LAB
GLUCOSE BLD-MCNC: 136 MG/DL (ref 60–115)
GLUCOSE BLD-MCNC: 222 MG/DL (ref 60–115)
PERFORMED ON: ABNORMAL
PERFORMED ON: ABNORMAL
TROPONIN: 0.01 NG/ML (ref 0–0.01)
TROPONIN: 0.01 NG/ML (ref 0–0.01)
TROPONIN: <0.01 NG/ML (ref 0–0.01)

## 2019-03-03 PROCEDURE — 2580000003 HC RX 258: Performed by: INTERNAL MEDICINE

## 2019-03-03 PROCEDURE — 6360000002 HC RX W HCPCS: Performed by: INTERNAL MEDICINE

## 2019-03-03 PROCEDURE — G0378 HOSPITAL OBSERVATION PER HR: HCPCS

## 2019-03-03 PROCEDURE — 96372 THER/PROPH/DIAG INJ SC/IM: CPT

## 2019-03-03 PROCEDURE — 93005 ELECTROCARDIOGRAM TRACING: CPT

## 2019-03-03 PROCEDURE — 6370000000 HC RX 637 (ALT 250 FOR IP): Performed by: NURSE PRACTITIONER

## 2019-03-03 PROCEDURE — 36415 COLL VENOUS BLD VENIPUNCTURE: CPT

## 2019-03-03 PROCEDURE — 84484 ASSAY OF TROPONIN QUANT: CPT

## 2019-03-03 RX ORDER — ASPIRIN 81 MG/1
81 TABLET, CHEWABLE ORAL DAILY
Status: DISCONTINUED | OUTPATIENT
Start: 2019-03-04 | End: 2019-03-03 | Stop reason: ALTCHOICE

## 2019-03-03 RX ORDER — LISINOPRIL 10 MG/1
10 TABLET ORAL DAILY
Status: DISCONTINUED | OUTPATIENT
Start: 2019-03-03 | End: 2019-03-03 | Stop reason: HOSPADM

## 2019-03-03 RX ORDER — SODIUM CHLORIDE 0.9 % (FLUSH) 0.9 %
10 SYRINGE (ML) INJECTION PRN
Status: DISCONTINUED | OUTPATIENT
Start: 2019-03-03 | End: 2019-03-03 | Stop reason: HOSPADM

## 2019-03-03 RX ORDER — ATORVASTATIN CALCIUM 40 MG/1
40 TABLET, FILM COATED ORAL NIGHTLY
Status: DISCONTINUED | OUTPATIENT
Start: 2019-03-03 | End: 2019-03-03 | Stop reason: ALTCHOICE

## 2019-03-03 RX ORDER — ONDANSETRON 2 MG/ML
4 INJECTION INTRAMUSCULAR; INTRAVENOUS EVERY 6 HOURS PRN
Status: DISCONTINUED | OUTPATIENT
Start: 2019-03-03 | End: 2019-03-03 | Stop reason: HOSPADM

## 2019-03-03 RX ORDER — ASPIRIN 81 MG/1
81 TABLET ORAL DAILY
Status: DISCONTINUED | OUTPATIENT
Start: 2019-03-03 | End: 2019-03-03 | Stop reason: HOSPADM

## 2019-03-03 RX ORDER — SIMVASTATIN 10 MG
10 TABLET ORAL NIGHTLY
Status: DISCONTINUED | OUTPATIENT
Start: 2019-03-03 | End: 2019-03-03 | Stop reason: HOSPADM

## 2019-03-03 RX ORDER — SODIUM CHLORIDE 0.9 % (FLUSH) 0.9 %
10 SYRINGE (ML) INJECTION EVERY 12 HOURS SCHEDULED
Status: DISCONTINUED | OUTPATIENT
Start: 2019-03-03 | End: 2019-03-03 | Stop reason: HOSPADM

## 2019-03-03 RX ADMIN — ASPIRIN 81 MG: 81 TABLET ORAL at 08:20

## 2019-03-03 RX ADMIN — LINAGLIPTIN 5 MG: 5 TABLET, FILM COATED ORAL at 08:21

## 2019-03-03 RX ADMIN — Medication 10 ML: at 08:21

## 2019-03-03 RX ADMIN — METFORMIN HYDROCHLORIDE 1000 MG: 500 TABLET ORAL at 08:20

## 2019-03-03 RX ADMIN — ENOXAPARIN SODIUM 80 MG: 80 INJECTION SUBCUTANEOUS at 02:01

## 2019-03-03 RX ADMIN — LISINOPRIL 10 MG: 10 TABLET ORAL at 08:21

## 2019-03-03 ASSESSMENT — ENCOUNTER SYMPTOMS
BACK PAIN: 0
STRIDOR: 0
CHOKING: 0
CHEST TIGHTNESS: 0
ABDOMINAL PAIN: 1
WHEEZING: 0
TROUBLE SWALLOWING: 0
SHORTNESS OF BREATH: 0
EYE DISCHARGE: 0
COUGH: 0
SINUS PRESSURE: 0
EYE ITCHING: 0
VOMITING: 0
FACIAL SWELLING: 0
COLOR CHANGE: 0
APNEA: 0
ABDOMINAL DISTENTION: 0
NAUSEA: 0
SORE THROAT: 0

## 2019-03-03 ASSESSMENT — PAIN SCALES - GENERAL
PAINLEVEL_OUTOF10: 0

## 2019-03-04 ENCOUNTER — TELEPHONE (OUTPATIENT)
Dept: INTERNAL MEDICINE CLINIC | Age: 64
End: 2019-03-04

## 2019-03-04 PROCEDURE — 93010 ELECTROCARDIOGRAM REPORT: CPT | Performed by: INTERNAL MEDICINE

## 2019-03-05 PROCEDURE — 93010 ELECTROCARDIOGRAM REPORT: CPT | Performed by: INTERNAL MEDICINE

## 2019-03-11 ENCOUNTER — OFFICE VISIT (OUTPATIENT)
Dept: INTERNAL MEDICINE CLINIC | Age: 64
End: 2019-03-11
Payer: MEDICARE

## 2019-03-11 VITALS
DIASTOLIC BLOOD PRESSURE: 73 MMHG | HEIGHT: 70 IN | SYSTOLIC BLOOD PRESSURE: 126 MMHG | BODY MASS INDEX: 26.05 KG/M2 | TEMPERATURE: 98.1 F | OXYGEN SATURATION: 99 % | HEART RATE: 88 BPM | RESPIRATION RATE: 12 BRPM | WEIGHT: 182 LBS

## 2019-03-11 DIAGNOSIS — I10 ESSENTIAL HYPERTENSION: Primary | ICD-10-CM

## 2019-03-11 DIAGNOSIS — E11.9 TYPE 2 DIABETES MELLITUS WITHOUT COMPLICATION, WITHOUT LONG-TERM CURRENT USE OF INSULIN (HCC): ICD-10-CM

## 2019-03-11 DIAGNOSIS — R07.9 CHEST PAIN, UNSPECIFIED TYPE: ICD-10-CM

## 2019-03-11 PROCEDURE — 1111F DSCHRG MED/CURRENT MED MERGE: CPT | Performed by: FAMILY MEDICINE

## 2019-03-11 PROCEDURE — 99214 OFFICE O/P EST MOD 30 MIN: CPT | Performed by: FAMILY MEDICINE

## 2019-03-11 ASSESSMENT — ENCOUNTER SYMPTOMS
RESPIRATORY NEGATIVE: 1
EYES NEGATIVE: 1
ALLERGIC/IMMUNOLOGIC NEGATIVE: 1
GASTROINTESTINAL NEGATIVE: 1
SHORTNESS OF BREATH: 0

## 2019-03-11 ASSESSMENT — PATIENT HEALTH QUESTIONNAIRE - PHQ9
2. FEELING DOWN, DEPRESSED OR HOPELESS: 0
SUM OF ALL RESPONSES TO PHQ QUESTIONS 1-9: 0
1. LITTLE INTEREST OR PLEASURE IN DOING THINGS: 0
SUM OF ALL RESPONSES TO PHQ QUESTIONS 1-9: 0
SUM OF ALL RESPONSES TO PHQ9 QUESTIONS 1 & 2: 0

## 2019-04-10 ENCOUNTER — OFFICE VISIT (OUTPATIENT)
Dept: CARDIOLOGY CLINIC | Age: 64
End: 2019-04-10
Payer: MEDICARE

## 2019-04-10 VITALS
RESPIRATION RATE: 16 BRPM | OXYGEN SATURATION: 98 % | HEIGHT: 70 IN | WEIGHT: 181 LBS | SYSTOLIC BLOOD PRESSURE: 128 MMHG | HEART RATE: 83 BPM | BODY MASS INDEX: 25.91 KG/M2 | DIASTOLIC BLOOD PRESSURE: 72 MMHG

## 2019-04-10 DIAGNOSIS — I20.8 OTHER FORMS OF ANGINA PECTORIS (HCC): Primary | ICD-10-CM

## 2019-04-10 DIAGNOSIS — R01.1 MURMUR: ICD-10-CM

## 2019-04-10 DIAGNOSIS — R07.9 CHEST PAIN, UNSPECIFIED TYPE: ICD-10-CM

## 2019-04-10 DIAGNOSIS — E78.2 MIXED HYPERLIPIDEMIA: ICD-10-CM

## 2019-04-10 DIAGNOSIS — I10 HYPERTENSION, UNSPECIFIED TYPE: ICD-10-CM

## 2019-04-10 PROCEDURE — 99214 OFFICE O/P EST MOD 30 MIN: CPT | Performed by: INTERNAL MEDICINE

## 2019-04-19 ENCOUNTER — HOSPITAL ENCOUNTER (OUTPATIENT)
Dept: NON INVASIVE DIAGNOSTICS | Age: 64
Discharge: HOME | End: 2019-04-19
Payer: MEDICARE

## 2019-04-19 ENCOUNTER — HOSPITAL ENCOUNTER (OUTPATIENT)
Dept: NUCLEAR MEDICINE | Age: 64
Discharge: HOME | End: 2019-04-21
Payer: MEDICARE

## 2019-04-19 DIAGNOSIS — R01.1 MURMUR: ICD-10-CM

## 2019-04-19 DIAGNOSIS — I10 HYPERTENSION, UNSPECIFIED TYPE: ICD-10-CM

## 2019-04-19 DIAGNOSIS — R07.9 CHEST PAIN, UNSPECIFIED TYPE: ICD-10-CM

## 2019-04-19 DIAGNOSIS — I20.8 OTHER FORMS OF ANGINA PECTORIS (HCC): ICD-10-CM

## 2019-04-19 LAB
LV EF: 60 %
LVEF MODALITY: NORMAL

## 2019-04-19 PROCEDURE — 93306 TTE W/DOPPLER COMPLETE: CPT

## 2019-04-19 PROCEDURE — A9502 TC99M TETROFOSMIN: HCPCS | Performed by: INTERNAL MEDICINE

## 2019-04-19 PROCEDURE — 78452 HT MUSCLE IMAGE SPECT MULT: CPT

## 2019-04-19 PROCEDURE — 2580000003 HC RX 258: Performed by: INTERNAL MEDICINE

## 2019-04-19 PROCEDURE — 93017 CV STRESS TEST TRACING ONLY: CPT

## 2019-04-19 PROCEDURE — 3430000000 HC RX DIAGNOSTIC RADIOPHARMACEUTICAL: Performed by: INTERNAL MEDICINE

## 2019-04-19 RX ORDER — SODIUM CHLORIDE 0.9 % (FLUSH) 0.9 %
10 SYRINGE (ML) INJECTION PRN
Status: DISCONTINUED | OUTPATIENT
Start: 2019-04-19 | End: 2019-04-22 | Stop reason: HOSPADM

## 2019-04-19 RX ADMIN — Medication 10 ML: at 12:42

## 2019-04-19 RX ADMIN — TETROFOSMIN 32.8 MILLICURIE: 1.38 INJECTION, POWDER, LYOPHILIZED, FOR SOLUTION INTRAVENOUS at 12:33

## 2019-04-19 RX ADMIN — Medication 10 ML: at 09:48

## 2019-04-19 RX ADMIN — TETROFOSMIN 10.2 MILLICURIE: 1.38 INJECTION, POWDER, LYOPHILIZED, FOR SOLUTION INTRAVENOUS at 09:53

## 2019-04-21 PROCEDURE — 93018 CV STRESS TEST I&R ONLY: CPT | Performed by: INTERNAL MEDICINE

## 2019-04-25 NOTE — PROCEDURES
[address]                      [address], [address] [facility]                              CARDIAC STRESS TEST    PATIENT NAME: Satinder Tolentino                 :        1955  MED REC NO:   [id number]                            ROOM:  ACCOUNT NO:   [de-identified]                           ADMIT DATE: 2019  PROVIDER:     Jennifer Holguin MD    CARDIOVASCULAR DIAGNOSTIC DEPARTMENT    DATE OF STUDY:  2019    PROCEDURE:  Treadmill nuclear stress test.    REFERRING PHYSICIANS:  Dr. Gautam Natarajan and  _____. INDICATIONS:  Chest pain. PROCEDURE DETAILS:  Following full informed consent, the patient was  brought to the stress test area where Mono protocol was performed. The  patient exercised for 5 minutes and 30 seconds, achieving 88% of maximal  age-predicted heart rate and 6.8 METs. Blood pressure response was  hypertensive. EKG did not demonstrate any significant EKG changes. The patient received 10.2 mCi of Myoview at 09:53 a.m. for rest images  and 32.8 mCi of Myoview at 01:50 p.m. for stress images. Review of raw images demonstrated a significantly large body habitus and  some diaphragmatic attenuation. FINDINGS:  Uptake of the tracer was homogeneous with no identifiable  ischemia or infarction. The TID ratio was normal at 0.79. The ejection  fraction was 92% and end-diastolic volume was 79 mL, which are normal.    CONCLUSIONS:  Negative treadmill nuclear stress test.  Low-risk stress.         Chay Pedraza MD    D: 2019 #13:14:08       T: 2019 13:24:44     [id number]  Job#: [id number]     Doc#: [id number]    CC:

## 2019-04-29 ENCOUNTER — TELEPHONE (OUTPATIENT)
Dept: FAMILY MEDICINE CLINIC | Age: 64
End: 2019-04-29

## 2019-04-29 RX ORDER — CEFDINIR 300 MG/1
300 CAPSULE ORAL 2 TIMES DAILY
Qty: 20 CAPSULE | Refills: 0 | Status: SHIPPED | OUTPATIENT
Start: 2019-04-29 | End: 2019-05-09

## 2019-04-29 NOTE — TELEPHONE ENCOUNTER
Pt called in asking for an antibiotics because he caught a \"Chest cold\" and is \"froggy\". Pt is asking if you approve of medication for it to be sent to his local [facility] pharmacy. Pt did not want to make an appt at this time. Please advise.

## 2019-05-22 ENCOUNTER — OFFICE VISIT (OUTPATIENT)
Dept: CARDIOLOGY CLINIC | Age: 64
End: 2019-05-22
Payer: MEDICARE

## 2019-05-22 VITALS
DIASTOLIC BLOOD PRESSURE: 76 MMHG | RESPIRATION RATE: 16 BRPM | BODY MASS INDEX: 25.62 KG/M2 | OXYGEN SATURATION: 98 % | HEIGHT: 70 IN | WEIGHT: 179 LBS | HEART RATE: 96 BPM | SYSTOLIC BLOOD PRESSURE: 134 MMHG

## 2019-05-22 DIAGNOSIS — I10 ESSENTIAL HYPERTENSION: ICD-10-CM

## 2019-05-22 DIAGNOSIS — R07.9 CHEST PAIN, UNSPECIFIED TYPE: ICD-10-CM

## 2019-05-22 DIAGNOSIS — E78.2 MIXED HYPERLIPIDEMIA: Primary | ICD-10-CM

## 2019-05-22 PROCEDURE — 99213 OFFICE O/P EST LOW 20 MIN: CPT | Performed by: INTERNAL MEDICINE

## 2019-05-22 NOTE — PROGRESS NOTES
Attends meetings of clubs or organizations: None     Relationship status: None    Intimate partner violence:     Fear of current or ex partner: None     Emotionally abused: None     Physically abused: None     Forced sexual activity: None   Other Topics Concern    None   Social History Narrative    None       No Known Allergies    Current Outpatient Medications   Medication Sig Dispense Refill    metFORMIN (GLUCOPHAGE) 500 MG tablet Take 2 tablets by mouth 2 times daily (with meals) 360 tablet 3    lisinopril (PRINIVIL;ZESTRIL) 10 MG tablet take 1 tablet by mouth once daily 90 tablet 3    simvastatin (ZOCOR) 10 MG tablet take 1 tablet by mouth once daily 90 tablet 3    SITagliptin (JANUVIA) 100 MG tablet Take 1 tablet by mouth daily 90 tablet 3    aspirin (ASPIRIN LOW DOSE) 81 MG EC tablet take 1 tablet by mouth once daily 30 tablet 5     No current facility-administered medications for this visit. Review of Systems:   Review of Systems   Constitutional: Negative for activity change and appetite change. HENT: Negative for congestion. Respiratory: Negative for apnea, choking and chest tightness. Cardiovascular: Negative for chest pain. Gastrointestinal: Negative for abdominal distention and abdominal pain. Endocrine: Negative for cold intolerance and heat intolerance. Genitourinary: Negative for dysuria and enuresis. Musculoskeletal: Negative for arthralgias and back pain. Skin: Negative for color change. Allergic/Immunologic: Negative. Neurological: Negative for dizziness, seizures, syncope and light-headedness. Psychiatric/Behavioral: Negative for agitation, behavioral problems and confusion. Physical Examination:    /76 (Site: Left Upper Arm, Position: Sitting, Cuff Size: Large Adult)   Pulse 96   Resp 16   Ht 5' 10\" (1.778 m)   Wt 179 lb (81.2 kg)   SpO2 98%   BMI 25.68 kg/m²    Physical Exam   Constitutional: The patient appears healthy. No distress. BMP:    Lab Results   Component Value Date     03/02/2019    K 4.2 03/02/2019    CL 99 03/02/2019    CO2 26 03/02/2019    BUN 14 03/02/2019    LABALBU 4.5 03/02/2019    CREATININE 1.38 03/02/2019    CALCIUM 9.9 03/02/2019    GFRAA >60.0 03/02/2019    LABGLOM 51.6 03/02/2019    GLUCOSE 163 03/02/2019     Magnesium:    Lab Results   Component Value Date    MG 1.9 01/17/2019     TSH:  Lab Results   Component Value Date    TSH 2.020 01/17/2019       Patient Active Problem List   Diagnosis    Hyperlipidemia    Hypertension    Osteoarthritis of spine with radiculopathy, lumbar region    DDD (degenerative disc disease), lumbar    Type 2 diabetes mellitus without complication, without long-term current use of insulin ([facility])    Chest pain       Medications:  Current Outpatient Medications   Medication Sig Dispense Refill    metFORMIN (GLUCOPHAGE) 500 MG tablet Take 2 tablets by mouth 2 times daily (with meals) 360 tablet 3    lisinopril (PRINIVIL;ZESTRIL) 10 MG tablet take 1 tablet by mouth once daily 90 tablet 3    simvastatin (ZOCOR) 10 MG tablet take 1 tablet by mouth once daily 90 tablet 3    SITagliptin (JANUVIA) 100 MG tablet Take 1 tablet by mouth daily 90 tablet 3    aspirin (ASPIRIN LOW DOSE) 81 MG EC tablet take 1 tablet by mouth once daily 30 tablet 5     No current facility-administered medications for this visit. Assessment/Plan:    1. Mixed hyperlipidemia  The patient has hyperlipidemia without statin intolerance. The patient will be continued on high intensity statin. The labs are managed by PCP. As per recent guidelines, moderate dose high intensity statin is indicated. 2. Essential hypertension  Patient has essential hypertension on BP medications. The guideline-directed target for BP in this patient is <130/80. In order to reach our target BP we will continue current BP medications, increasing the dose of current meds or adding new to reach target.       3. Chest pain, unspecified type  The patient was experiencing symptoms of chest pain and/or dyspnea of unclear etiology. Non-invasive cardiac testing was performed and was low risk for cardiac etiology. Recommend medical therapy and risk factor management. Counseling: the patient was counseled regarding diet, exercise, weight control and heart healthy lifestyle. Return in about 6 months (around 11/22/2019) for followup cv disease. Electronically signed by   Nitish De Leon.  Eugenio Patel MD [facility] Director of Cardiology Services and Cardiac Catheterization Laboratory  [facility], [place]    on 5/22/2019 at 11:01 AM

## 2019-06-10 ENCOUNTER — OFFICE VISIT (OUTPATIENT)
Dept: FAMILY MEDICINE CLINIC | Age: 64
End: 2019-06-10
Payer: MEDICARE

## 2019-06-10 VITALS
HEIGHT: 70 IN | DIASTOLIC BLOOD PRESSURE: 70 MMHG | BODY MASS INDEX: 25.77 KG/M2 | OXYGEN SATURATION: 97 % | TEMPERATURE: 98.4 F | SYSTOLIC BLOOD PRESSURE: 120 MMHG | WEIGHT: 180 LBS | HEART RATE: 84 BPM

## 2019-06-10 DIAGNOSIS — I10 ESSENTIAL HYPERTENSION: ICD-10-CM

## 2019-06-10 DIAGNOSIS — E11.9 TYPE 2 DIABETES MELLITUS WITHOUT COMPLICATION, WITHOUT LONG-TERM CURRENT USE OF INSULIN (HCC): Primary | ICD-10-CM

## 2019-06-10 DIAGNOSIS — E78.2 MIXED HYPERLIPIDEMIA: ICD-10-CM

## 2019-06-10 LAB
CHP ED QC CHECK: ABNORMAL
GLUCOSE BLD-MCNC: 173 MG/DL
HBA1C MFR BLD: 7.3 %

## 2019-06-10 PROCEDURE — 99214 OFFICE O/P EST MOD 30 MIN: CPT | Performed by: FAMILY MEDICINE

## 2019-06-10 PROCEDURE — 82962 GLUCOSE BLOOD TEST: CPT | Performed by: FAMILY MEDICINE

## 2019-06-10 PROCEDURE — 83036 HEMOGLOBIN GLYCOSYLATED A1C: CPT | Performed by: FAMILY MEDICINE

## 2019-06-10 RX ORDER — ASPIRIN 81 MG/1
TABLET ORAL
Qty: 90 TABLET | Refills: 3 | Status: SHIPPED | OUTPATIENT
Start: 2019-06-10 | End: 2019-12-13 | Stop reason: SDUPTHER

## 2019-06-10 RX ORDER — SIMVASTATIN 10 MG
TABLET ORAL
Qty: 90 TABLET | Refills: 3 | Status: SHIPPED | OUTPATIENT
Start: 2019-06-10 | End: 2019-12-13 | Stop reason: SDUPTHER

## 2019-06-10 RX ORDER — LISINOPRIL 10 MG/1
TABLET ORAL
Qty: 90 TABLET | Refills: 3 | Status: SHIPPED | OUTPATIENT
Start: 2019-06-10 | End: 2019-12-13 | Stop reason: SDUPTHER

## 2019-06-10 ASSESSMENT — ENCOUNTER SYMPTOMS
GASTROINTESTINAL NEGATIVE: 1
EYES NEGATIVE: 1
RESPIRATORY NEGATIVE: 1
ALLERGIC/IMMUNOLOGIC NEGATIVE: 1
SHORTNESS OF BREATH: 0

## 2019-06-10 NOTE — PROGRESS NOTES
Patient is seen in follow up for   Chief Complaint   Patient presents with    Established New Doctor    Diabetes     BG random 173, A1C 7.3    Health Maintenance     due for DM foot exam      Diabetes   He presents for his follow-up diabetic visit. He has type 2 diabetes mellitus. His disease course has been stable. There are no hypoglycemic associated symptoms. There are no diabetic associated symptoms. Pertinent negatives for diabetes include no chest pain. There are no hypoglycemic complications. Symptoms are stable. There are no diabetic complications. Current diabetic treatment includes oral agent (dual therapy). He is compliant with treatment most of the time. There is no change in his home blood glucose trend.        Past Medical History:   Diagnosis Date    Cancer [facility])     testicular cancer in 1982    Diabetes mellitus ([facility] [address]     Hyperlipidemia     Hypertension      Patient Active Problem List    Diagnosis Date Noted    Chest pain 03/03/2019    Type 2 diabetes mellitus without complication, without long-term current use of insulin ([facility] [address] 02/20/2018    Osteoarthritis of spine with radiculopathy, lumbar region 04/27/2017    DDD (degenerative disc disease), lumbar 04/27/2017    Hyperlipidemia     Hypertension      Past Surgical History:   Procedure Laterality Date    [place] COLON CA SCRN NOT HI RSK IND N/A 3/29/2018    COLONOSCOPY performed by Haleigh Kee MD at [place] [address]     Family History   Problem Relation Age of Onset    Cancer Mother         ovarian     Other Father      Social History     Socioeconomic History    Marital status: Single     Spouse name: None    Number of children: None    Years of education: None    Highest education level: None   Occupational History    None   Social Needs    Financial resource strain: None    Food insecurity:     Worry: None     Inability: None    Transportation needs:     Medical: None     Non-medical: None   Tobacco Use    Smoking status: Never Smoker    Smokeless tobacco: Never Used   Substance and Sexual Activity    Alcohol use: No    Drug use: No    Sexual activity: None   Lifestyle    Physical activity:     Days per week: None     Minutes per session: None    Stress: None   Relationships    Social connections:     Talks on phone: None     Gets together: None     Attends Jehovah's witness service: None     Active member of club or organization: None     Attends meetings of clubs or organizations: None     Relationship status: None    Intimate partner violence:     Fear of current or ex partner: None     Emotionally abused: None     Physically abused: None     Forced sexual activity: None   Other Topics Concern    None   Social History Narrative    None     Current Outpatient Medications   Medication Sig Dispense Refill    metFORMIN (GLUCOPHAGE) 500 MG tablet Take 2 tablets by mouth 2 times daily (with meals) 360 tablet 3    lisinopril (PRINIVIL;ZESTRIL) 10 MG tablet take 1 tablet by mouth once daily 90 tablet 3    simvastatin (ZOCOR) 10 MG tablet take 1 tablet by mouth once daily 90 tablet 3    SITagliptin (JANUVIA) 100 MG tablet Take 1 tablet by mouth daily 90 tablet 3    aspirin (ASPIRIN LOW DOSE) 81 MG EC tablet take 1 tablet by mouth once daily 90 tablet 3    Blood Glucose Monitoring Suppl (ACURA BLOOD GLUCOSE METER) w/Device KIT 1 Device by Does not apply route 2 times daily DISP WITH TEST STRIP AND LANCETS CHECK BID # 200 3 RF 1 kit 0     No current facility-administered medications for this visit. No current outpatient medications on file prior to visit. No current facility-administered medications on file prior to visit.       No Known Allergies  Health Maintenance   Topic Date Due    Shingles Vaccine (1 of 2) 05/21/2003    Diabetic foot exam  02/20/2019    A1C test (Diabetic or Prediabetic)  01/17/2020    Diabetic microalbuminuria test  01/17/2020    Lipid screen  01/17/2020    Potassium monitoring  03/02/2020    Creatinine monitoring  03/02/2020    Diabetic retinal exam  09/11/2020    DTaP/Tdap/Td vaccine (2 - Td) 01/03/2022    Pneumococcal 65+ years Vaccine (2 of 2 - PPSV23) 11/17/2022    Colon cancer screen colonoscopy  03/29/2028    Flu vaccine  Completed    Hepatitis C screen  Completed       Review of Systems     Review of Systems   Constitutional: Negative for activity change, appetite change, chills, fever and unexpected weight change. HENT: Negative. Eyes: Negative. Respiratory: Negative. Negative for shortness of breath. Cardiovascular: Negative. Negative for chest pain and palpitations. Gastrointestinal: Negative. Endocrine: Negative. Genitourinary: Negative. Musculoskeletal: Negative. Skin: Negative. Allergic/Immunologic: Negative. Neurological: Negative. Hematological: Negative. Psychiatric/Behavioral: Negative. Physical Exam  Vitals:    06/10/19 1357   BP: 120/70   Site: Left Upper Arm   Position: Sitting   Cuff Size: Medium Adult   Pulse: 84   Temp: 98.4 °F (36.9 °C)   TempSrc: Oral   SpO2: 97%   Weight: 180 lb (81.6 kg)   Height: 5' 10\" (1.778 m)       Physical Exam   Constitutional: He is oriented to person, place, and time. He appears well-developed and well-nourished. HENT:   Right Ear: External ear normal.   Left Ear: External ear normal.   Eyes: Pupils are equal, round, and reactive to light. Conjunctivae and EOM are normal.   Neck: Normal range of motion. Neck supple. No thyromegaly present. Cardiovascular: Normal rate, regular rhythm, normal heart sounds and intact distal pulses. Exam reveals no gallop and no friction rub. No murmur heard. Pulmonary/Chest: Effort normal and breath sounds normal. No respiratory distress. He has no wheezes. Abdominal: Soft. Bowel sounds are normal. He exhibits no distension and no mass. There is no tenderness. There is no rebound and no guarding. No hernia.    Genitourinary: Penis normal.   Musculoskeletal: Normal range of motion. He exhibits no edema or tenderness. Lymphadenopathy:     He has no cervical adenopathy. Neurological: He is alert and oriented to person, place, and time. No cranial nerve deficit. Coordination normal.   Skin: Skin is warm and dry. Psychiatric: He has a normal mood and affect. Assessment   Diagnosis Orders   1. Type 2 diabetes mellitus without complication, without long-term current use of insulin ([facility])  POCT glycosylated hemoglobin (Hb A1C)    POCT Glucose     DIABETES FOOT EXAM    metFORMIN (GLUCOPHAGE) 500 MG tablet    lisinopril (PRINIVIL;ZESTRIL) 10 MG tablet    simvastatin (ZOCOR) 10 MG tablet    SITagliptin (JANUVIA) 100 MG tablet   2. Essential hypertension  lisinopril (PRINIVIL;ZESTRIL) 10 MG tablet    aspirin (ASPIRIN LOW DOSE) 81 MG EC tablet   3.  Mixed hyperlipidemia  simvastatin (ZOCOR) 10 MG tablet    aspirin (ASPIRIN LOW DOSE) 81 MG EC tablet     Problem List     Hyperlipidemia    Relevant Medications    lisinopril (PRINIVIL;ZESTRIL) 10 MG tablet    simvastatin (ZOCOR) 10 MG tablet    aspirin (ASPIRIN LOW DOSE) 81 MG EC tablet    Hypertension    Relevant Medications    lisinopril (PRINIVIL;ZESTRIL) 10 MG tablet    simvastatin (ZOCOR) 10 MG tablet    aspirin (ASPIRIN LOW DOSE) 81 MG EC tablet    Type 2 diabetes mellitus without complication, without long-term current use of insulin ([facility]) - Primary    Relevant Medications    metFORMIN (GLUCOPHAGE) 500 MG tablet    lisinopril (PRINIVIL;ZESTRIL) 10 MG tablet    simvastatin (ZOCOR) 10 MG tablet    SITagliptin (JANUVIA) 100 MG tablet    Other Relevant Orders    POCT glycosylated hemoglobin (Hb A1C) (Completed)    POCT Glucose (Completed)     DIABETES FOOT EXAM (Completed)          Plan  Orders Placed This Encounter   Procedures    POCT glycosylated hemoglobin (Hb A1C)    POCT Glucose     DIABETES FOOT EXAM     Orders Placed This Encounter   Medications    metFORMIN (GLUCOPHAGE) 500 MG tablet     Sig: Take 2 tablets by mouth 2 times daily (with meals)     Dispense:  360 tablet     Refill:  3    lisinopril (PRINIVIL;ZESTRIL) 10 MG tablet     Sig: take 1 tablet by mouth once daily     Dispense:  90 tablet     Refill:  3    simvastatin (ZOCOR) 10 MG tablet     Sig: take 1 tablet by mouth once daily     Dispense:  90 tablet     Refill:  3    SITagliptin (JANUVIA) 100 MG tablet     Sig: Take 1 tablet by mouth daily     Dispense:  90 tablet     Refill:  3    aspirin (ASPIRIN LOW DOSE) 81 MG EC tablet     Sig: take 1 tablet by mouth once daily     Dispense:  90 tablet     Refill:  3    Blood Glucose Monitoring Suppl (ACURA BLOOD GLUCOSE METER) w/Device KIT     Si Device by Does not apply route 2 times daily DISP WITH TEST STRIP AND LANCETS CHECK BID # 200 3 RF     Dispense:  1 kit     Refill:  0     No follow-ups on file.   Nichelle Fernandez MD

## 2019-06-11 RX ORDER — LANCETS 30 GAUGE
EACH MISCELLANEOUS
Qty: 100 EACH | Refills: 3 | Status: SHIPPED | OUTPATIENT
Start: 2019-06-11

## 2019-09-05 ENCOUNTER — OFFICE VISIT (OUTPATIENT)
Dept: FAMILY MEDICINE CLINIC | Age: 64
End: 2019-09-05
Payer: MEDICARE

## 2019-09-05 VITALS
TEMPERATURE: 97.8 F | BODY MASS INDEX: 25.48 KG/M2 | HEART RATE: 81 BPM | WEIGHT: 178 LBS | RESPIRATION RATE: 12 BRPM | HEIGHT: 70 IN | SYSTOLIC BLOOD PRESSURE: 128 MMHG | OXYGEN SATURATION: 98 % | DIASTOLIC BLOOD PRESSURE: 68 MMHG

## 2019-09-05 DIAGNOSIS — Z00.00 ROUTINE GENERAL MEDICAL EXAMINATION AT A HEALTH CARE FACILITY: Primary | ICD-10-CM

## 2019-09-05 PROCEDURE — G0438 PPPS, INITIAL VISIT: HCPCS | Performed by: FAMILY MEDICINE

## 2019-09-05 ASSESSMENT — VISUAL ACUITY
OD_CC: 20/30
OS_CC: 20/50

## 2019-09-05 ASSESSMENT — PATIENT HEALTH QUESTIONNAIRE - PHQ9
SUM OF ALL RESPONSES TO PHQ QUESTIONS 1-9: 0
SUM OF ALL RESPONSES TO PHQ QUESTIONS 1-9: 0

## 2019-09-05 ASSESSMENT — LIFESTYLE VARIABLES: HOW OFTEN DO YOU HAVE A DRINK CONTAINING ALCOHOL: 0

## 2019-12-13 ENCOUNTER — OFFICE VISIT (OUTPATIENT)
Dept: FAMILY MEDICINE CLINIC | Age: 64
End: 2019-12-13
Payer: MEDICARE

## 2019-12-13 ENCOUNTER — TELEPHONE (OUTPATIENT)
Dept: FAMILY MEDICINE CLINIC | Age: 64
End: 2019-12-13

## 2019-12-13 VITALS
HEART RATE: 88 BPM | RESPIRATION RATE: 18 BRPM | OXYGEN SATURATION: 97 % | HEIGHT: 70 IN | SYSTOLIC BLOOD PRESSURE: 132 MMHG | WEIGHT: 182 LBS | BODY MASS INDEX: 26.05 KG/M2 | DIASTOLIC BLOOD PRESSURE: 74 MMHG | TEMPERATURE: 98.1 F

## 2019-12-13 DIAGNOSIS — E11.9 TYPE 2 DIABETES MELLITUS WITHOUT COMPLICATION, WITHOUT LONG-TERM CURRENT USE OF INSULIN (HCC): Primary | ICD-10-CM

## 2019-12-13 DIAGNOSIS — Z12.5 SCREENING FOR PROSTATE CANCER: ICD-10-CM

## 2019-12-13 DIAGNOSIS — I10 ESSENTIAL HYPERTENSION: ICD-10-CM

## 2019-12-13 DIAGNOSIS — E78.2 MIXED HYPERLIPIDEMIA: ICD-10-CM

## 2019-12-13 LAB — HBA1C MFR BLD: 7.5 %

## 2019-12-13 PROCEDURE — 83036 HEMOGLOBIN GLYCOSYLATED A1C: CPT | Performed by: NURSE PRACTITIONER

## 2019-12-13 PROCEDURE — 99214 OFFICE O/P EST MOD 30 MIN: CPT | Performed by: NURSE PRACTITIONER

## 2019-12-13 RX ORDER — ASPIRIN 81 MG/1
TABLET ORAL
Qty: 90 TABLET | Refills: 3 | Status: SHIPPED | OUTPATIENT
Start: 2019-12-13 | End: 2022-05-10 | Stop reason: ALTCHOICE

## 2019-12-13 RX ORDER — SIMVASTATIN 10 MG
TABLET ORAL
Qty: 90 TABLET | Refills: 3 | Status: SHIPPED | OUTPATIENT
Start: 2019-12-13 | End: 2020-06-12 | Stop reason: SDUPTHER

## 2019-12-13 RX ORDER — LISINOPRIL 10 MG/1
TABLET ORAL
Qty: 90 TABLET | Refills: 3 | Status: SHIPPED | OUTPATIENT
Start: 2019-12-13 | End: 2020-06-12 | Stop reason: SDUPTHER

## 2019-12-13 ASSESSMENT — ENCOUNTER SYMPTOMS
EYES NEGATIVE: 1
ALLERGIC/IMMUNOLOGIC NEGATIVE: 1
GASTROINTESTINAL NEGATIVE: 1
SHORTNESS OF BREATH: 0
BLURRED VISION: 0
RESPIRATORY NEGATIVE: 1
ORTHOPNEA: 0

## 2020-06-05 DIAGNOSIS — E11.9 TYPE 2 DIABETES MELLITUS WITHOUT COMPLICATION, WITHOUT LONG-TERM CURRENT USE OF INSULIN (HCC): ICD-10-CM

## 2020-06-05 DIAGNOSIS — Z12.5 SCREENING FOR PROSTATE CANCER: ICD-10-CM

## 2020-06-05 LAB
ALBUMIN SERPL-MCNC: 4.2 G/DL (ref 3.5–4.6)
ALP BLD-CCNC: 58 U/L (ref 35–104)
ALT SERPL-CCNC: 33 U/L (ref 0–41)
ANION GAP SERPL CALCULATED.3IONS-SCNC: 10 MEQ/L (ref 9–15)
AST SERPL-CCNC: 30 U/L (ref 0–40)
BASOPHILS ABSOLUTE: 0 K/UL (ref 0–0.2)
BASOPHILS RELATIVE PERCENT: 0.8 %
BILIRUB SERPL-MCNC: 0.3 MG/DL (ref 0.2–0.7)
BUN BLDV-MCNC: 18 MG/DL (ref 8–23)
CALCIUM SERPL-MCNC: 9.7 MG/DL (ref 8.5–9.9)
CHLORIDE BLD-SCNC: 98 MEQ/L (ref 95–107)
CHOLESTEROL, TOTAL: 152 MG/DL (ref 0–199)
CO2: 23 MEQ/L (ref 20–31)
CREAT SERPL-MCNC: 1.29 MG/DL (ref 0.7–1.2)
CREATININE URINE: 210.8 MG/DL
EOSINOPHILS ABSOLUTE: 0.1 K/UL (ref 0–0.7)
EOSINOPHILS RELATIVE PERCENT: 2.5 %
GFR AFRICAN AMERICAN: >60
GFR NON-AFRICAN AMERICAN: 55.5
GLOBULIN: 3 G/DL (ref 2.3–3.5)
GLUCOSE BLD-MCNC: 165 MG/DL (ref 70–99)
HBA1C MFR BLD: 7.8 % (ref 4.8–5.9)
HCT VFR BLD CALC: 40.8 % (ref 42–52)
HDLC SERPL-MCNC: 51 MG/DL (ref 40–59)
HEMOGLOBIN: 13.3 G/DL (ref 14–18)
LDL CHOLESTEROL CALCULATED: 83 MG/DL (ref 0–129)
LYMPHOCYTES ABSOLUTE: 1 K/UL (ref 1–4.8)
LYMPHOCYTES RELATIVE PERCENT: 19.7 %
MCH RBC QN AUTO: 30.3 PG (ref 27–31.3)
MCHC RBC AUTO-ENTMCNC: 32.7 % (ref 33–37)
MCV RBC AUTO: 92.7 FL (ref 80–100)
MICROALBUMIN UR-MCNC: <1.2 MG/DL
MICROALBUMIN/CREAT UR-RTO: NORMAL MG/G (ref 0–30)
MONOCYTES ABSOLUTE: 0.5 K/UL (ref 0.2–0.8)
MONOCYTES RELATIVE PERCENT: 10.2 %
NEUTROPHILS ABSOLUTE: 3.5 K/UL (ref 1.4–6.5)
NEUTROPHILS RELATIVE PERCENT: 66.8 %
PDW BLD-RTO: 13.9 % (ref 11.5–14.5)
PLATELET # BLD: 181 K/UL (ref 130–400)
POTASSIUM SERPL-SCNC: 4.9 MEQ/L (ref 3.4–4.9)
PROSTATE SPECIFIC ANTIGEN: 0.75 NG/ML (ref 0–5.4)
RBC # BLD: 4.4 M/UL (ref 4.7–6.1)
SODIUM BLD-SCNC: 131 MEQ/L (ref 135–144)
TOTAL PROTEIN: 7.2 G/DL (ref 6.3–8)
TRIGL SERPL-MCNC: 90 MG/DL (ref 0–150)
WBC # BLD: 5.2 K/UL (ref 4.8–10.8)

## 2020-06-12 ENCOUNTER — VIRTUAL VISIT (OUTPATIENT)
Dept: FAMILY MEDICINE CLINIC | Age: 65
End: 2020-06-12
Payer: MEDICARE

## 2020-06-12 PROCEDURE — 99213 OFFICE O/P EST LOW 20 MIN: CPT | Performed by: FAMILY MEDICINE

## 2020-06-12 PROCEDURE — 3051F HG A1C>EQUAL 7.0%<8.0%: CPT | Performed by: FAMILY MEDICINE

## 2020-06-12 RX ORDER — SIMVASTATIN 10 MG
TABLET ORAL
Qty: 90 TABLET | Refills: 3 | Status: SHIPPED | OUTPATIENT
Start: 2020-06-12 | End: 2020-12-09 | Stop reason: SDUPTHER

## 2020-06-12 RX ORDER — LISINOPRIL 10 MG/1
TABLET ORAL
Qty: 90 TABLET | Refills: 3 | Status: SHIPPED | OUTPATIENT
Start: 2020-06-12 | End: 2020-12-09 | Stop reason: SDUPTHER

## 2020-06-12 ASSESSMENT — PATIENT HEALTH QUESTIONNAIRE - PHQ9
1. LITTLE INTEREST OR PLEASURE IN DOING THINGS: 0
SUM OF ALL RESPONSES TO PHQ QUESTIONS 1-9: 0
SUM OF ALL RESPONSES TO PHQ QUESTIONS 1-9: 0
2. FEELING DOWN, DEPRESSED OR HOPELESS: 0
SUM OF ALL RESPONSES TO PHQ9 QUESTIONS 1 & 2: 0

## 2020-06-12 ASSESSMENT — ENCOUNTER SYMPTOMS
EYES NEGATIVE: 1
ALLERGIC/IMMUNOLOGIC NEGATIVE: 1
GASTROINTESTINAL NEGATIVE: 1
SHORTNESS OF BREATH: 0
RESPIRATORY NEGATIVE: 1

## 2020-06-12 NOTE — PROGRESS NOTES
and Sexual Activity    Alcohol use: No    Drug use: No    Sexual activity: Not on file   Lifestyle    Physical activity     Days per week: Not on file     Minutes per session: Not on file    Stress: Not on file   Relationships    Social connections     Talks on phone: Not on file     Gets together: Not on file     Attends Taoist service: Not on file     Active member of club or organization: Not on file     Attends meetings of clubs or organizations: Not on file     Relationship status: Not on file    Intimate partner violence     Fear of current or ex partner: Not on file     Emotionally abused: Not on file     Physically abused: Not on file     Forced sexual activity: Not on file   Other Topics Concern    Not on file   Social History Narrative    Not on file     Current Outpatient Medications   Medication Sig Dispense Refill    SITagliptin (JANUVIA) 100 MG tablet Take 1 tablet by mouth daily 90 tablet 3    metFORMIN (GLUCOPHAGE) 500 MG tablet Take 2 tablets by mouth 2 times daily (with meals) 360 tablet 3    lisinopril (PRINIVIL;ZESTRIL) 10 MG tablet take 1 tablet by mouth once daily 90 tablet 3    simvastatin (ZOCOR) 10 MG tablet take 1 tablet by mouth once daily 90 tablet 3    blood glucose test strips (ACURA BLOOD GLUCOSE TEST) strip Checks 1 time daily. NIDDM--ICD-10 E11.9 100 each 3    aspirin (ASPIRIN LOW DOSE) 81 MG EC tablet take 1 tablet by mouth once daily 90 tablet 3    Lancets MISC Checks 1 time daily. NIDDM--ICD-10 E11.9 100 each 3    Blood Glucose Monitoring Suppl (ACURA BLOOD GLUCOSE METER) w/Device KIT 1 Device by Does not apply route 2 times daily DISP WITH TEST STRIP AND LANCETS CHECK BID # 200 3 RF 1 kit 0     No current facility-administered medications for this visit.       Current Outpatient Medications on File Prior to Visit   Medication Sig Dispense Refill    aspirin (ASPIRIN LOW DOSE) 81 MG EC tablet take 1 tablet by mouth once daily 90 tablet 3    Lancets MISC Checks 1 10 MG tablet    simvastatin (ZOCOR) 10 MG tablet   2. Essential hypertension  lisinopril (PRINIVIL;ZESTRIL) 10 MG tablet   3. Mixed hyperlipidemia  simvastatin (ZOCOR) 10 MG tablet     Problem List     Hyperlipidemia    Relevant Medications    aspirin (ASPIRIN LOW DOSE) 81 MG EC tablet    lisinopril (PRINIVIL;ZESTRIL) 10 MG tablet    simvastatin (ZOCOR) 10 MG tablet    Hypertension    Relevant Medications    aspirin (ASPIRIN LOW DOSE) 81 MG EC tablet    lisinopril (PRINIVIL;ZESTRIL) 10 MG tablet    Type 2 diabetes mellitus without complication, without long-term current use of insulin (HCC)    Relevant Medications    SITagliptin (JANUVIA) 100 MG tablet    metFORMIN (GLUCOPHAGE) 500 MG tablet    lisinopril (PRINIVIL;ZESTRIL) 10 MG tablet    simvastatin (ZOCOR) 10 MG tablet          Plan  No orders of the defined types were placed in this encounter. Orders Placed This Encounter   Medications    SITagliptin (JANUVIA) 100 MG tablet     Sig: Take 1 tablet by mouth daily     Dispense:  90 tablet     Refill:  3    metFORMIN (GLUCOPHAGE) 500 MG tablet     Sig: Take 2 tablets by mouth 2 times daily (with meals)     Dispense:  360 tablet     Refill:  3    lisinopril (PRINIVIL;ZESTRIL) 10 MG tablet     Sig: take 1 tablet by mouth once daily     Dispense:  90 tablet     Refill:  3    simvastatin (ZOCOR) 10 MG tablet     Sig: take 1 tablet by mouth once daily     Dispense:  90 tablet     Refill:  3    blood glucose test strips (ACURA BLOOD GLUCOSE TEST) strip     Sig: Checks 1 time daily. NIDDM--ICD-10 E11.9     Dispense:  100 each     Refill:  3     No follow-ups on file.   Sylvester Sapp MD

## 2020-06-29 NOTE — PROGRESS NOTES
[facility] CARDIOLOGY OFFICE FOLLOW-UP      Patient: Stefanie Crane  YOB: 1955  MRN: [id number]    Chief Complaint:  Chief Complaint   Patient presents with   Harris Joseph Cardiologist       Subjective/HPI    The patient is followed in the cardiology office chronically for the following problems: abdominal/lower chest pain    The last encounter focused on the following: first office visit    Testing/hospitalizations/procedures performed since last encounter: first consult was in hospital where he ruled out for MI    Since the last encounter, the patient has not had new symptoms/events.     Past Medical History:   Diagnosis Date    Cancer [facility])     testicular cancer in 1982    Diabetes mellitus ([facility] [address]     Hyperlipidemia     Hypertension        Past Surgical History:   Procedure Laterality Date    [place] COLON [place] SCRN NOT HI RSK IND N/A 3/29/2018    COLONOSCOPY performed by Ander Rubi MD at [place] [address]       Family History   Problem Relation Age of Onset   Iglesias Cancer Mother         ovarian     Other Father        Social History     Socioeconomic History    Marital status: Single     Spouse name: None    Number of children: None    Years of education: None    Highest education level: None   Occupational History    None   Social Needs    Financial resource strain: None    Food insecurity:     Worry: None     Inability: None    Transportation needs:     Medical: None     Non-medical: None   Tobacco Use    Smoking status: Never Smoker    Smokeless tobacco: Never Used   Substance and Sexual Activity    Alcohol use: No    Drug use: No    Sexual activity: None   Lifestyle    Physical activity:     Days per week: None     Minutes per session: None    Stress: None   Relationships    Social connections:     Talks on phone: None     Gets together: None     Attends Taoism service: None     Active member of club or organization: None     Attends meetings of clubs or organizations: None     Relationship status: None    Intimate partner violence:     Fear of current or ex partner: None     Emotionally abused: None     Physically abused: None     Forced sexual activity: None   Other Topics Concern    None   Social History Narrative    None       No Known Allergies    Current Outpatient Medications   Medication Sig Dispense Refill    metFORMIN (GLUCOPHAGE) 500 MG tablet Take 2 tablets by mouth 2 times daily (with meals) 360 tablet 3    lisinopril (PRINIVIL;ZESTRIL) 10 MG tablet take 1 tablet by mouth once daily 90 tablet 3    SITagliptin (JANUVIA) 100 MG tablet Take 1 tablet by mouth daily 90 tablet 3    aspirin (ASPIRIN LOW DOSE) 81 MG EC tablet take 1 tablet by mouth once daily 30 tablet 5    simvastatin (ZOCOR) 10 MG tablet take 1 tablet by mouth once daily 90 tablet 3     No current facility-administered medications for this visit. Review of Systems:   Review of Systems   Constitutional: Negative for activity change and appetite change. HENT: Negative for congestion. Respiratory: Negative for apnea, choking and chest tightness. Cardiovascular: Negative for chest pain. Gastrointestinal: Negative for abdominal distention and abdominal pain. Endocrine: Negative for cold intolerance and heat intolerance. Genitourinary: Negative for dysuria and enuresis. Musculoskeletal: Negative for arthralgias and back pain. Skin: Negative for color change. Allergic/Immunologic: Negative. Neurological: Negative for dizziness, seizures, syncope and light-headedness. Psychiatric/Behavioral: Negative for agitation, behavioral problems and confusion. Physical Examination:    /72 (Site: Left Upper Arm, Position: Sitting, Cuff Size: Large Adult)   Pulse 83   Resp 16   Ht 5' 10\" (1.778 m)   Wt 181 lb (82.1 kg)   SpO2 98%   BMI 25.97 kg/m²    Physical Exam   Constitutional: The patient appears healthy. No distress.    HENT: Mouth/Throat: Oropharynx is clear. Eyes: Pupils are equal, round, and reactive to light. Neck: Normal range of motion. No JVD present. Cardiovascular: Regular rhythm, S1 normal, S2 normal, normal heart sounds and intact distal pulses. Exam reveals no gallop. No murmur heard. Pulses:       Radial pulses are 2+ on the right side. Dorsalis pedis pulses are 2+ on the right side. Pulmonary/Chest: Effort normal and breath sounds normal. No wheezes. No rales. No tenderness. Abdominal: Soft. Bowel sounds are normal.   Musculoskeletal: Normal range of motion. No edema. Neurological: The patient is alert and oriented to person, place, and time. Intact cranial nerves. Skin: Skin is warm and dry. No rash noted.        LABS:  CBC:   Lab Results   Component Value Date    WBC 7.3 03/02/2019    RBC 4.58 03/02/2019    HGB 14.2 03/02/2019    HCT 42.4 03/02/2019    MCV 92.5 03/02/2019    MCH 30.9 03/02/2019    MCHC 33.4 03/02/2019    RDW 13.9 03/02/2019     03/02/2019     Lipids:  Lab Results   Component Value Date    CHOL 184 01/17/2019    CHOL 159 07/16/2018    CHOL 167 04/16/2018     Lab Results   Component Value Date    TRIG 106 01/17/2019    TRIG 106 07/16/2018    TRIG 134 04/16/2018     Lab Results   Component Value Date    HDL 59 01/17/2019    HDL 51 07/16/2018    HDL 54 04/16/2018     Lab Results   Component Value Date    LDLCALC 104 01/17/2019    LDLCALC 87 07/16/2018    LDLCALC 86 04/16/2018     No results found for: LABVLDL, VLDL  No results found for: CHOLHDLRATIO  CMP:    Lab Results   Component Value Date     03/02/2019    K 4.2 03/02/2019    CL 99 03/02/2019    CO2 26 03/02/2019    BUN 14 03/02/2019    CREATININE 1.38 03/02/2019    GFRAA >60.0 03/02/2019    LABGLOM 51.6 03/02/2019    GLUCOSE 163 03/02/2019    PROT 7.6 03/02/2019    LABALBU 4.5 03/02/2019    CALCIUM 9.9 03/02/2019    BILITOT 0.3 03/02/2019    ALKPHOS 55 03/02/2019    AST 22 03/02/2019    ALT 24 03/02/2019     BMP:    Lab Results Component Value Date     03/02/2019    K 4.2 03/02/2019    CL 99 03/02/2019    CO2 26 03/02/2019    BUN 14 03/02/2019    LABALBU 4.5 03/02/2019    CREATININE 1.38 03/02/2019    CALCIUM 9.9 03/02/2019    GFRAA >60.0 03/02/2019    LABGLOM 51.6 03/02/2019    GLUCOSE 163 03/02/2019     Magnesium:    Lab Results   Component Value Date    MG 1.9 01/17/2019     TSH:  Lab Results   Component Value Date    TSH 2.020 01/17/2019       Patient Active Problem List   Diagnosis    Hyperlipidemia    Hypertension    Osteoarthritis of spine with radiculopathy, lumbar region    DDD (degenerative disc disease), lumbar    Type 2 diabetes mellitus without complication, without long-term current use of insulin ([facility])    Chest pain       Medications:  Current Outpatient Medications   Medication Sig Dispense Refill    metFORMIN (GLUCOPHAGE) 500 MG tablet Take 2 tablets by mouth 2 times daily (with meals) 360 tablet 3    lisinopril (PRINIVIL;ZESTRIL) 10 MG tablet take 1 tablet by mouth once daily 90 tablet 3    SITagliptin (JANUVIA) 100 MG tablet Take 1 tablet by mouth daily 90 tablet 3    aspirin (ASPIRIN LOW DOSE) 81 MG EC tablet take 1 tablet by mouth once daily 30 tablet 5    simvastatin (ZOCOR) 10 MG tablet take 1 tablet by mouth once daily 90 tablet 3     No current facility-administered medications for this visit. Assessment/Plan:    1. Other forms of angina pectoris ([address]    - NM MYOCARDIAL SPECT REST EXERCISE OR RX; Future    2. Mixed hyperlipidemia      3. Hypertension, unspecified type    - ECHO Complete 2D W Doppler W Color; Future    4. Chest pain, unspecified type    - NM MYOCARDIAL SPECT REST EXERCISE OR RX; Future  - ECHO Complete 2D W Doppler W Color; Future    5. Murmur    - ECHO Complete 2D W Doppler W Color; Future     Check testing    Counseling: the patient was counseled regarding diet, exercise, weight control and heart healthy lifestyle.     Return in about 1 month (around 5/8/2019) for followup cv disease. Electronically signed by   Milton Flanagan.  Eric Butler MD [facility] Director of Cardiology Services and Cardiac Catheterization Laboratory  [facility], [place]    on 4/10/2019 at 11:30 AM W Plasty Text: The lesion was extirpated to the level of the fat with a #15 scalpel blade.  Given the location of the defect, shape of the defect and the proximity to free margins a W-plasty was deemed most appropriate for repair.  Using a sterile surgical marker, the appropriate transposition arms of the W-plasty were drawn incorporating the defect and placing the expected incisions within the relaxed skin tension lines where possible.    The area thus outlined was incised deep to adipose tissue with a #15 scalpel blade.  The skin margins were undermined to an appropriate distance in all directions utilizing iris scissors.  The opposing transposition arms were then transposed into place in opposite direction and anchored with interrupted buried subcutaneous sutures.

## 2020-08-19 ENCOUNTER — OFFICE VISIT (OUTPATIENT)
Dept: FAMILY MEDICINE CLINIC | Age: 65
End: 2020-08-19
Payer: MEDICARE

## 2020-08-19 VITALS
OXYGEN SATURATION: 96 % | WEIGHT: 181 LBS | HEART RATE: 78 BPM | RESPIRATION RATE: 16 BRPM | DIASTOLIC BLOOD PRESSURE: 78 MMHG | TEMPERATURE: 98.5 F | HEIGHT: 70 IN | SYSTOLIC BLOOD PRESSURE: 126 MMHG | BODY MASS INDEX: 25.91 KG/M2

## 2020-08-19 PROCEDURE — 99214 OFFICE O/P EST MOD 30 MIN: CPT | Performed by: FAMILY MEDICINE

## 2020-08-19 PROCEDURE — 3051F HG A1C>EQUAL 7.0%<8.0%: CPT | Performed by: FAMILY MEDICINE

## 2020-08-19 ASSESSMENT — ENCOUNTER SYMPTOMS
ALLERGIC/IMMUNOLOGIC NEGATIVE: 1
GASTROINTESTINAL NEGATIVE: 1
EYES NEGATIVE: 1
RESPIRATORY NEGATIVE: 1
SHORTNESS OF BREATH: 0

## 2020-08-19 NOTE — PROGRESS NOTES
Chest pain 03/03/2019    Type 2 diabetes mellitus without complication, without long-term current use of insulin [facility] 02/20/2018    Osteoarthritis of spine with radiculopathy, lumbar region 04/27/2017    DDD (degenerative disc disease), lumbar 04/27/2017    Hyperlipidemia     Hypertension      Past Surgical History:   Procedure Laterality Date    TX COLON CA SCRN NOT HI RSK IND N/A 3/29/2018    COLONOSCOPY performed by Mao Ford MD at [facility]     Family History   Problem Relation Age of Onset    Cancer Mother         ovarian     Other Father      Social History     Socioeconomic History    Marital status: Single     Spouse name: None    Number of children: None    Years of education: None    Highest education level: None   Occupational History    None   Social Needs    Financial resource strain: None    Food insecurity     Worry: None     Inability: None    Transportation needs     Medical: None     Non-medical: None   Tobacco Use    Smoking status: Never Smoker    Smokeless tobacco: Never Used   Substance and Sexual Activity    Alcohol use: No    Drug use: No    Sexual activity: None   Lifestyle    Physical activity     Days per week: None     Minutes per session: None    Stress: None   Relationships    Social connections     Talks on phone: None     Gets together: None     Attends Spiritism service: None     Active member of club or organization: None     Attends meetings of clubs or organizations: None     Relationship status: None    Intimate partner violence     Fear of current or ex partner: None     Emotionally abused: None     Physically abused: None     Forced sexual activity: None   Other Topics Concern    None   Social History Narrative    None     Current Outpatient Medications   Medication Sig Dispense Refill    SITagliptin (JANUVIA) 100 MG tablet Take 1 tablet by mouth daily 90 tablet 3    metFORMIN (GLUCOPHAGE) 500 MG tablet Take 2 tablets by mouth 2 times daily (with meals) 360 tablet 3    lisinopril (PRINIVIL;ZESTRIL) 10 MG tablet take 1 tablet by mouth once daily 90 tablet 3    simvastatin (ZOCOR) 10 MG tablet take 1 tablet by mouth once daily 90 tablet 3    blood glucose test strips (ACURA BLOOD GLUCOSE TEST) strip Checks 1 time daily. NIDDM--ICD-10 E11.9 100 each 3    aspirin (ASPIRIN LOW DOSE) 81 MG EC tablet take 1 tablet by mouth once daily 90 tablet 3    Lancets MISC Checks 1 time daily. NIDDM--ICD-10 E11.9 100 each 3    Blood Glucose Monitoring Suppl (ACURA BLOOD GLUCOSE METER) w/Device KIT 1 Device by Does not apply route 2 times daily DISP WITH TEST STRIP AND LANCETS CHECK BID # 200 3 RF 1 kit 0     No current facility-administered medications for this visit. Current Outpatient Medications on File Prior to Visit   Medication Sig Dispense Refill    SITagliptin (JANUVIA) 100 MG tablet Take 1 tablet by mouth daily 90 tablet 3    metFORMIN (GLUCOPHAGE) 500 MG tablet Take 2 tablets by mouth 2 times daily (with meals) 360 tablet 3    lisinopril (PRINIVIL;ZESTRIL) 10 MG tablet take 1 tablet by mouth once daily 90 tablet 3    simvastatin (ZOCOR) 10 MG tablet take 1 tablet by mouth once daily 90 tablet 3    blood glucose test strips (ACURA BLOOD GLUCOSE TEST) strip Checks 1 time daily. NIDDM--ICD-10 E11.9 100 each 3    aspirin (ASPIRIN LOW DOSE) 81 MG EC tablet take 1 tablet by mouth once daily 90 tablet 3    Lancets MISC Checks 1 time daily. NIDDM--ICD-10 E11.9 100 each 3    Blood Glucose Monitoring Suppl (ACURA BLOOD GLUCOSE METER) w/Device KIT 1 Device by Does not apply route 2 times daily DISP WITH TEST STRIP AND LANCETS CHECK BID # 200 3 RF 1 kit 0     No current facility-administered medications on file prior to visit.       No Known Allergies  Health Maintenance   Topic Date Due    Shingles Vaccine (1 of 2) 05/21/2003    Diabetic foot exam  06/10/2020    Annual Wellness Visit (AWV)  09/05/2020    Flu vaccine (1) 09/01/2020    Diabetic retinal exam  09/11/2020    A1C test (Diabetic or Prediabetic)  06/05/2021    Diabetic microalbuminuria test  06/05/2021    Lipid screen  06/05/2021    Potassium monitoring  06/05/2021    Creatinine monitoring  06/05/2021    DTaP/Tdap/Td vaccine (2 - Td) 01/03/2022    Pneumococcal 65+ years Vaccine (2 of 2 - PPSV23) 11/17/2022    Colon cancer screen colonoscopy  03/29/2028    Hepatitis C screen  Completed    Hepatitis A vaccine  Aged Out    Hib vaccine  Aged Out    Meningococcal (ACWY) vaccine  Aged Out       Review of Systems     Review of Systems   Constitutional: Negative for activity change, appetite change, chills, fever and unexpected weight change. HENT: Negative. Eyes: Negative. Respiratory: Negative. Negative for shortness of breath. Cardiovascular: Negative. Negative for chest pain and palpitations. Gastrointestinal: Negative. Endocrine: Negative. Genitourinary: Negative. Musculoskeletal: Negative. Skin: Negative. Allergic/Immunologic: Negative. Neurological: Negative. Hematological: Negative. Psychiatric/Behavioral: Negative. Physical Exam  Vitals:    08/19/20 1154   BP: 126/78   Site: Right Upper Arm   Position: Sitting   Cuff Size: Small Adult   Pulse: 78   Resp: 16   Temp: 98.5 °F (36.9 °C)   TempSrc: Oral   SpO2: 96%   Weight: 181 lb (82.1 kg)   Height: 5' 10\" (1.778 m)       Physical Exam  Constitutional:       Appearance: He is well-developed. HENT:      Right Ear: External ear normal.      Left Ear: External ear normal.   Eyes:      Conjunctiva/sclera: Conjunctivae normal.      Pupils: Pupils are equal, round, and reactive to light. Neck:      Musculoskeletal: Normal range of motion and neck supple. Thyroid: No thyromegaly. Cardiovascular:      Rate and Rhythm: Normal rate and regular rhythm. Heart sounds: Normal heart sounds. No murmur. No friction rub. No gallop.     Pulmonary:      Effort: Pulmonary effort is normal. No respiratory distress. Breath sounds: Normal breath sounds. No wheezing. Abdominal:      General: Bowel sounds are normal. There is no distension. Palpations: Abdomen is soft. There is no mass. Tenderness: There is no abdominal tenderness. There is no guarding or rebound. Hernia: No hernia is present. Genitourinary:     Penis: Normal.    Musculoskeletal: Normal range of motion. General: No tenderness. Lymphadenopathy:      Cervical: No cervical adenopathy. Skin:     General: Skin is warm and dry. Neurological:      Mental Status: He is alert and oriented to person, place, and time. Cranial Nerves: No cranial nerve deficit. Coordination: Coordination normal.         Assessment   Diagnosis Orders   1. Type 2 diabetes mellitus without complication, without long-term current use of insulin ([facility])   DIABETES FOOT EXAM   2. Essential hypertension  controlled   3. Mixed hyperlipidemia  controlled     Problem List     Hyperlipidemia    Relevant Medications    aspirin (ASPIRIN LOW DOSE) 81 MG EC tablet    lisinopril (PRINIVIL;ZESTRIL) 10 MG tablet    simvastatin (ZOCOR) 10 MG tablet    Hypertension    Relevant Medications    aspirin (ASPIRIN LOW DOSE) 81 MG EC tablet    lisinopril (PRINIVIL;ZESTRIL) 10 MG tablet    Type 2 diabetes mellitus without complication, without long-term current use of insulin ([facility]) - Primary    Relevant Medications    SITagliptin (JANUVIA) 100 MG tablet    metFORMIN (GLUCOPHAGE) 500 MG tablet    lisinopril (PRINIVIL;ZESTRIL) 10 MG tablet    simvastatin (ZOCOR) 10 MG tablet    Other Relevant Orders     DIABETES FOOT EXAM (Completed)          Plan  Orders Placed This Encounter   Procedures     DIABETES FOOT EXAM     No orders of the defined types were placed in this encounter. Return in about 6 months (around 2/19/2021), or if symptoms worsen or fail to improve.   Marielle Escalona MD

## 2020-12-09 ENCOUNTER — VIRTUAL VISIT (OUTPATIENT)
Dept: FAMILY MEDICINE CLINIC | Age: 65
End: 2020-12-09
Payer: MEDICARE

## 2020-12-09 PROCEDURE — 3051F HG A1C>EQUAL 7.0%<8.0%: CPT | Performed by: FAMILY MEDICINE

## 2020-12-09 PROCEDURE — 99213 OFFICE O/P EST LOW 20 MIN: CPT | Performed by: FAMILY MEDICINE

## 2020-12-09 RX ORDER — SIMVASTATIN 10 MG
TABLET ORAL
Qty: 90 TABLET | Refills: 3 | Status: SHIPPED | OUTPATIENT
Start: 2020-12-09 | End: 2021-12-15

## 2020-12-09 RX ORDER — LISINOPRIL 10 MG/1
TABLET ORAL
Qty: 90 TABLET | Refills: 3 | Status: SHIPPED | OUTPATIENT
Start: 2020-12-09 | End: 2021-08-02

## 2020-12-09 NOTE — PROGRESS NOTES
Patient is seen in follow up for   Chief Complaint   Patient presents with    Diabetes    Hypertension     Diabetes   He presents for his follow-up diabetic visit. He has type 2 diabetes mellitus. His disease course has been stable. There are no diabetic associated symptoms. Symptoms are stable.        Past Medical History:   Diagnosis Date    Cancer [facility])     testicular cancer in 1982    Diabetes mellitus ([facility]     Hyperlipidemia     Hypertension      Patient Active Problem List    Diagnosis Date Noted    Chest pain 03/03/2019    Type 2 diabetes mellitus without complication, without long-term current use of insulin ([facility] 02/20/2018    Osteoarthritis of spine with radiculopathy, lumbar region 04/27/2017    DDD (degenerative disc disease), lumbar 04/27/2017    Hyperlipidemia     Hypertension      Past Surgical History:   Procedure Laterality Date    TN COLON CA SCRN NOT HI RSK IND N/A 3/29/2018    COLONOSCOPY performed by Hope Low MD at [place] [address]     Family History   Problem Relation Age of Onset   Bernardo Rea Cancer Mother         ovarian     Other Father      Social History     Socioeconomic History    Marital status: Single     Spouse name: Not on file    Number of children: Not on file    Years of education: Not on file    Highest education level: Not on file   Occupational History    Not on file   Social Needs    Financial resource strain: Not on file    Food insecurity     Worry: Not on file     Inability: Not on file    Transportation needs     Medical: Not on file     Non-medical: Not on file   Tobacco Use    Smoking status: Never Smoker    Smokeless tobacco: Never Used   Substance and Sexual Activity    Alcohol use: No    Drug use: No    Sexual activity: Not on file   Lifestyle    Physical activity     Days per week: Not on file     Minutes per session: Not on file    Stress: Not on file   Relationships    Social connections     Talks on phone: Not on file     Gets together: Not on file     Attends Scientologist service: Not on file     Active member of club or organization: Not on file     Attends meetings of clubs or organizations: Not on file     Relationship status: Not on file    Intimate partner violence     Fear of current or ex partner: Not on file     Emotionally abused: Not on file     Physically abused: Not on file     Forced sexual activity: Not on file   Other Topics Concern    Not on file   Social History Narrative    Not on file     Current Outpatient Medications   Medication Sig Dispense Refill    metFORMIN (GLUCOPHAGE) 500 MG tablet Take 2 tablets by mouth 2 times daily (with meals) 360 tablet 3    SITagliptin (JANUVIA) 100 MG tablet Take 1 tablet by mouth daily 90 tablet 3    lisinopril (PRINIVIL;ZESTRIL) 10 MG tablet take 1 tablet by mouth once daily 90 tablet 3    simvastatin (ZOCOR) 10 MG tablet take 1 tablet by mouth once daily 90 tablet 3    blood glucose test strips (ACURA BLOOD GLUCOSE TEST) strip Checks 1 time daily. NIDDM--ICD-10 E11.9 100 each 3    aspirin (ASPIRIN LOW DOSE) 81 MG EC tablet take 1 tablet by mouth once daily 90 tablet 3    Lancets MISC Checks 1 time daily. NIDDM--ICD-10 E11.9 100 each 3    Blood Glucose Monitoring Suppl (ACURA BLOOD GLUCOSE METER) w/Device KIT 1 Device by Does not apply route 2 times daily DISP WITH TEST STRIP AND LANCETS CHECK BID # 200 3 RF 1 kit 0     No current facility-administered medications for this visit. Current Outpatient Medications on File Prior to Visit   Medication Sig Dispense Refill    blood glucose test strips (ACURA BLOOD GLUCOSE TEST) strip Checks 1 time daily. NIDDM--ICD-10 E11.9 100 each 3    aspirin (ASPIRIN LOW DOSE) 81 MG EC tablet take 1 tablet by mouth once daily 90 tablet 3    Lancets MISC Checks 1 time daily. NIDDM--ICD-10 E11.9 100 each 3    Blood Glucose Monitoring Suppl (ACURA BLOOD GLUCOSE METER) w/Device KIT 1 Device by Does not apply route 2 times daily DISP WITH TEST STRIP AND LANCETS CHECK BID # 200 3 RF 1 kit 0     No current facility-administered medications on file prior to visit. No Known Allergies  Health Maintenance   Topic Date Due    Shingles Vaccine (1 of 2) 05/21/2003    Annual Wellness Visit (AWV)  05/29/2019    Diabetic retinal exam  09/11/2019    Flu vaccine (1) 09/01/2020    A1C test (Diabetic or Prediabetic)  06/05/2021    Diabetic microalbuminuria test  06/05/2021    Lipid screen  06/05/2021    Potassium monitoring  06/05/2021    Creatinine monitoring  06/05/2021    Diabetic foot exam  08/19/2021    DTaP/Tdap/Td vaccine (2 - Td) 01/03/2022    Pneumococcal 65+ years Vaccine (2 of 2 - PPSV23) 11/17/2022    Colon cancer screen colonoscopy  03/29/2028    Hepatitis C screen  Completed    Hepatitis A vaccine  Aged Out    Hib vaccine  Aged Out    Meningococcal (ACWY) vaccine  Aged Out       Review of Systems     Review of Systems    Physical Exam  There were no vitals filed for this visit. Physical Exam    Assessment   Diagnosis Orders   1. Type 2 diabetes mellitus without complication, without long-term current use of insulin (HCC)  metFORMIN (GLUCOPHAGE) 500 MG tablet    SITagliptin (JANUVIA) 100 MG tablet    lisinopril (PRINIVIL;ZESTRIL) 10 MG tablet    simvastatin (ZOCOR) 10 MG tablet    Hemoglobin A1C    Microalbumin / Creatinine Urine Ratio   2. Essential hypertension  lisinopril (PRINIVIL;ZESTRIL) 10 MG tablet    CBC Auto Differential    Comprehensive Metabolic Panel    Lipid Panel   3. Mixed hyperlipidemia  simvastatin (ZOCOR) 10 MG tablet   4.  Screening for prostate cancer  Psa screening     Problem List     Hyperlipidemia    Relevant Medications    aspirin (ASPIRIN LOW DOSE) 81 MG EC tablet    lisinopril (PRINIVIL;ZESTRIL) 10 MG tablet    simvastatin (ZOCOR) 10 MG tablet    Hypertension    Relevant Medications    aspirin (ASPIRIN LOW DOSE) 81 MG EC tablet    lisinopril (PRINIVIL;ZESTRIL) 10 MG tablet    Other Relevant Orders    CBC Auto Differential    Comprehensive Metabolic Panel    Lipid Panel    Type 2 diabetes mellitus without complication, without long-term current use of insulin (HCC) - Primary    Relevant Medications    metFORMIN (GLUCOPHAGE) 500 MG tablet    SITagliptin (JANUVIA) 100 MG tablet    lisinopril (PRINIVIL;ZESTRIL) 10 MG tablet    simvastatin (ZOCOR) 10 MG tablet    Other Relevant Orders    Hemoglobin A1C    Microalbumin / Creatinine Urine Ratio          Plan  Orders Placed This Encounter   Procedures    CBC Auto Differential     Standing Status:   Future     Standing Expiration Date:   12/9/2021    Comprehensive Metabolic Panel     Standing Status:   Future     Standing Expiration Date:   12/9/2021    Lipid Panel     Standing Status:   Future     Standing Expiration Date:   12/9/2021     Order Specific Question:   Is Patient Fasting?/# of Hours     Answer:   8    Hemoglobin A1C     Standing Status:   Future     Standing Expiration Date:   12/9/2021    Psa screening     Standing Status:   Future     Standing Expiration Date:   12/9/2021    Microalbumin / Creatinine Urine Ratio     Standing Status:   Future     Standing Expiration Date:   12/9/2021     Orders Placed This Encounter   Medications    metFORMIN (GLUCOPHAGE) 500 MG tablet     Sig: Take 2 tablets by mouth 2 times daily (with meals)     Dispense:  360 tablet     Refill:  3    SITagliptin (JANUVIA) 100 MG tablet     Sig: Take 1 tablet by mouth daily     Dispense:  90 tablet     Refill:  3    lisinopril (PRINIVIL;ZESTRIL) 10 MG tablet     Sig: take 1 tablet by mouth once daily     Dispense:  90 tablet     Refill:  3    simvastatin (ZOCOR) 10 MG tablet     Sig: take 1 tablet by mouth once daily     Dispense:  90 tablet     Refill:  3     Return in about 6 months (around 6/9/2021).   Castro Sotelo MD

## 2021-05-12 ENCOUNTER — OFFICE VISIT (OUTPATIENT)
Dept: FAMILY MEDICINE CLINIC | Age: 66
End: 2021-05-12
Payer: MEDICARE

## 2021-05-12 VITALS
TEMPERATURE: 97.9 F | HEART RATE: 79 BPM | DIASTOLIC BLOOD PRESSURE: 66 MMHG | WEIGHT: 180 LBS | RESPIRATION RATE: 18 BRPM | OXYGEN SATURATION: 99 % | HEIGHT: 70 IN | SYSTOLIC BLOOD PRESSURE: 120 MMHG | BODY MASS INDEX: 25.77 KG/M2

## 2021-05-12 DIAGNOSIS — Z00.00 ROUTINE GENERAL MEDICAL EXAMINATION AT A HEALTH CARE FACILITY: Primary | ICD-10-CM

## 2021-05-12 DIAGNOSIS — I10 ESSENTIAL HYPERTENSION: ICD-10-CM

## 2021-05-12 DIAGNOSIS — E11.9 TYPE 2 DIABETES MELLITUS WITHOUT COMPLICATION, WITHOUT LONG-TERM CURRENT USE OF INSULIN (HCC): ICD-10-CM

## 2021-05-12 DIAGNOSIS — Z12.5 SCREENING FOR PROSTATE CANCER: ICD-10-CM

## 2021-05-12 LAB
ALBUMIN SERPL-MCNC: 4.3 G/DL (ref 3.5–4.6)
ALP BLD-CCNC: 62 U/L (ref 35–104)
ALT SERPL-CCNC: 29 U/L (ref 0–41)
ANION GAP SERPL CALCULATED.3IONS-SCNC: 10 MEQ/L (ref 9–15)
AST SERPL-CCNC: 27 U/L (ref 0–40)
BASOPHILS ABSOLUTE: 0.1 K/UL (ref 0–0.2)
BASOPHILS RELATIVE PERCENT: 0.9 %
BILIRUB SERPL-MCNC: <0.2 MG/DL (ref 0.2–0.7)
BUN BLDV-MCNC: 22 MG/DL (ref 8–23)
CALCIUM SERPL-MCNC: 9.8 MG/DL (ref 8.5–9.9)
CHLORIDE BLD-SCNC: 103 MEQ/L (ref 95–107)
CHOLESTEROL, TOTAL: 159 MG/DL (ref 0–199)
CO2: 23 MEQ/L (ref 20–31)
CREAT SERPL-MCNC: 1.3 MG/DL (ref 0.7–1.2)
CREATININE URINE: 71 MG/DL
EOSINOPHILS ABSOLUTE: 0.2 K/UL (ref 0–0.7)
EOSINOPHILS RELATIVE PERCENT: 2.9 %
GFR AFRICAN AMERICAN: >60
GFR NON-AFRICAN AMERICAN: 54.9
GLOBULIN: 2.7 G/DL (ref 2.3–3.5)
GLUCOSE BLD-MCNC: 170 MG/DL (ref 70–99)
HBA1C MFR BLD: 7.6 % (ref 4.8–5.9)
HCT VFR BLD CALC: 38 % (ref 42–52)
HDLC SERPL-MCNC: 54 MG/DL (ref 40–59)
HEMOGLOBIN: 12.5 G/DL (ref 14–18)
LDL CHOLESTEROL CALCULATED: 79 MG/DL (ref 0–129)
LYMPHOCYTES ABSOLUTE: 1 K/UL (ref 1–4.8)
LYMPHOCYTES RELATIVE PERCENT: 18.2 %
MCH RBC QN AUTO: 30.3 PG (ref 27–31.3)
MCHC RBC AUTO-ENTMCNC: 32.9 % (ref 33–37)
MCV RBC AUTO: 92.2 FL (ref 80–100)
MICROALBUMIN UR-MCNC: <1.2 MG/DL
MICROALBUMIN/CREAT UR-RTO: NORMAL MG/G (ref 0–30)
MONOCYTES ABSOLUTE: 0.5 K/UL (ref 0.2–0.8)
MONOCYTES RELATIVE PERCENT: 9.2 %
NEUTROPHILS ABSOLUTE: 3.8 K/UL (ref 1.4–6.5)
NEUTROPHILS RELATIVE PERCENT: 68.8 %
PDW BLD-RTO: 13.9 % (ref 11.5–14.5)
PLATELET # BLD: 205 K/UL (ref 130–400)
POTASSIUM SERPL-SCNC: 4.2 MEQ/L (ref 3.4–4.9)
PROSTATE SPECIFIC ANTIGEN: 0.84 NG/ML (ref 0–5.4)
RBC # BLD: 4.12 M/UL (ref 4.7–6.1)
SODIUM BLD-SCNC: 136 MEQ/L (ref 135–144)
TOTAL PROTEIN: 7 G/DL (ref 6.3–8)
TRIGL SERPL-MCNC: 131 MG/DL (ref 0–150)
WBC # BLD: 5.5 K/UL (ref 4.8–10.8)

## 2021-05-12 PROCEDURE — G0439 PPPS, SUBSEQ VISIT: HCPCS | Performed by: NURSE PRACTITIONER

## 2021-05-12 ASSESSMENT — VISUAL ACUITY
OD_CC: 20/25
OS_CC: 20/25

## 2021-05-12 ASSESSMENT — PATIENT HEALTH QUESTIONNAIRE - PHQ9: SUM OF ALL RESPONSES TO PHQ QUESTIONS 1-9: 0

## 2021-05-12 ASSESSMENT — LIFESTYLE VARIABLES: HOW OFTEN DO YOU HAVE A DRINK CONTAINING ALCOHOL: 0

## 2021-05-12 NOTE — PROGRESS NOTES
Medicare Annual Wellness Visit  Name: Eva Vásquez Date: 2021   MRN: [id number] Sex: Male   Age: 77 y.o. Ethnicity: Non-/Non    : 1955 Race: Mukund Fernando is here for Medicare AWV    Screenings for behavioral, psychosocial and functional/safety risks, and cognitive dysfunction are all negative except as indicated below. These results, as well as other patient data from the [address] [facility] form, are documented in Flowsheets linked to this Encounter. No Known Allergies      Prior to Visit Medications    Medication Sig Taking? Authorizing Provider   metFORMIN (GLUCOPHAGE) 500 MG tablet Take 2 tablets by mouth 2 times daily (with meals) Yes Tonya Cedeno MD   SITagliptin (JANUVIA) 100 MG tablet Take 1 tablet by mouth daily Yes Tonya Cedeno MD   lisinopril (PRINIVIL;ZESTRIL) 10 MG tablet take 1 tablet by mouth once daily Yes Tonya Cedeno MD   simvastatin (ZOCOR) 10 MG tablet take 1 tablet by mouth once daily Yes Tonya Cedeno MD   blood glucose test strips [facility] BLOOD GLUCOSE TEST) strip Checks 1 time daily. NIDDM--ICD-10 E11.9 Yes Tonya Cedeno MD   aspirin (ASPIRIN LOW DOSE) 81 MG EC tablet take 1 tablet by mouth once daily Yes TERESSA Brink - CNP   Lancets MISC Checks 1 time daily. NIDDM--ICD-10 E11.9 Yes Tonya Cedeno MD   Blood Glucose Monitoring Suppl (ACURA BLOOD GLUCOSE METER) w/Device KIT 1 Device by Does not apply route 2 times daily DISP WITH TEST STRIP AND LANCETS CHECK BID # 200 3 RF Yes Tonya Cedeno MD         Past Medical History:   Diagnosis Date    Cancer [facility])     testicular cancer in 1982    Diabetes mellitus ([facility] [address] [address]     Hyperlipidemia     Hypertension        Past Surgical History:   Procedure Laterality Date    HI COLON CA SCRN NOT HI RSK IND N/A 3/29/2018    COLONOSCOPY performed by Edmund Ziegler MD at [place] [address]         Family History   Problem Relation Age of Onset    Cancer Mother         ovarian     Other Father        CareTeam (Including outside providers/suppliers regularly involved in providing care):   Patient Care Team:  Tika Jacobson MD as PCP - General (Family Medicine)  Tika Jacobson MD as PCP - [facility]  Silva Melendez MD as Consulting Physician (Interventional Cardiology)    Wt Readings from Last 3 Encounters:   05/12/21 180 lb (81.6 kg)   08/19/20 181 lb (82.1 kg)   12/13/19 182 lb (82.6 kg)     Vitals:    05/12/21 1253   BP: 120/66   Site: Left Upper Arm   Position: Sitting   Cuff Size: Medium Adult   Pulse: 79   Resp: 18   Temp: 97.9 °F (36.6 °C)   TempSrc: Temporal   SpO2: 99%   Weight: 180 lb (81.6 kg)   Height: 5' 10\" (1.778 m)     Body mass index is 25.83 kg/m². Based upon direct observation of the patient, evaluation of cognition reveals recent and remote memory intact. Patient's complete Health Risk Assessment and screening values have been reviewed and are found in Flowsheets. The following problems were reviewed today and where indicated follow up appointments were made and/or referrals ordered. Positive Risk Factor Screenings with Interventions:            General Health and ACP:  General  In general, how would you say your health is?: Very Good  In the past 7 days, have you experienced any of the following?  New or Increased Pain, New or Increased Fatigue, Loneliness, Social Isolation, Stress or Anger?: (!) Stress  Do you get the social and emotional support that you need?: Yes  Do you have a Living Will?: Yes  Advance Directives     Power of  Living Will ACP-Advance Directive ACP-Power of     Not on File Filed on 03/30/18 Filed Not on File      General Health Risk Interventions:  · Stress: relaxation techniques discussed, patient declines any further evaluation/treatment for this issue     Hearing/Vision:   Hearing Screening    125Hz 250Hz 500Hz 1000Hz 2000Hz 3000Hz 4000Hz 6000Hz 8000Hz   Right ear: Left ear:               Visual Acuity Screening    Right eye Left eye Both eyes   Without correction:      With correction: 20/25 20/25 20/20     Hearing/Vision  Do you or your family notice any trouble with your hearing that hasn't been managed with hearing aids?: No  Do you have difficulty driving, watching TV, or doing any of your daily activities because of your eyesight?: No  Have you had an eye exam within the past year?: Yes  Hearing/Vision Interventions:  · no concerns      Personalized Preventive Plan   Current Health Maintenance Status  Immunization History   Administered Date(s) Administered    COVID-19, Moderna, PF, 100mcg/0.5mL 03/02/2021, 03/27/2021    Influenza Vaccine, unspecified formulation 10/01/2016, 10/15/2017    Influenza Virus Vaccine 10/01/2016, 10/15/2017    Influenza, High Dose (Fluzone 65 yrs and older) 08/25/2018, 09/24/2019    Influenza, Quadv, IM, PF (6 mo and older Fluzone, Flulaval, Fluarix, and 3 yrs and older Afluria) 08/15/2017    Influenza, Quadv, adjuvanted, 65 yrs +, IM, PF (Fluad) 09/02/2020    Influenza, Triv, inactivated, subunit, adjuvanted, IM (Fluad 65 yrs and older) 08/25/2018    Pneumococcal Conjugate 13-valent ([id number]) 01/23/2019    Pneumococcal Polysaccharide ([id number]) 11/17/2017    Tdap (Boostrix, Adacel) 01/03/2012    Unknown Immunization 09/02/2020        Health Maintenance   Topic Date Due    Shingles Vaccine (1 of 2) Never done   [employer] Visit (AWV)  Never done    Diabetic retinal exam  09/11/2019    A1C test (Diabetic or Prediabetic)  06/05/2021    Diabetic microalbuminuria test  06/05/2021    Lipid screen  06/05/2021    Potassium monitoring  06/05/2021    Creatinine monitoring  06/05/2021    Diabetic foot exam  08/19/2021    DTaP/Tdap/Td vaccine (2 - Td) 01/03/2022    Pneumococcal 65+ years Vaccine (2 of 2 - PPSV23) 11/17/2022    Colon cancer screen colonoscopy  03/29/2028    Flu vaccine  Completed    COVID-19 Vaccine Completed    Hepatitis C screen  Completed    Hepatitis A vaccine  Aged Out    Hib vaccine  Aged Out    Meningococcal (ACWY) vaccine  Aged Out     Recommendations for [employer] Compliance Due: see orders and patient instructions/AVS.  . Recommended screening schedule for the next 5-10 years is provided to the patient in written form: see Patient Instructions/AVS.    Stone Castro was seen today for medicare aw.     Diagnoses and all orders for this visit:    Routine general medical examination at a health care facility

## 2021-05-12 NOTE — PATIENT INSTRUCTIONS
Personalized Preventive Plan for Ace Bucio - 5/12/2021  Medicare offers a range of preventive health benefits. Some of the tests and screenings are paid in full while other may be subject to a deductible, co-insurance, and/or copay. Some of these benefits include a comprehensive review of your medical history including lifestyle, illnesses that may run in your family, and various assessments and screenings as appropriate. After reviewing your medical record and screening and assessments performed today your provider may have ordered immunizations, labs, imaging, and/or referrals for you. A list of these orders (if applicable) as well as your Preventive Care list are included within your After Visit Summary for your review. Other Preventive Recommendations:    · A preventive eye exam performed by an eye specialist is recommended every 1-2 years to screen for glaucoma; cataracts, macular degeneration, and other eye disorders. · A preventive dental visit is recommended every 6 months. · Try to get at least 150 minutes of exercise per week or 10,000 steps per day on a pedometer . · Order or download the FREE \"Exercise & Physical Activity: Your Everyday Guide\" from [employer]. Call 5-888.543.9100 or search [employer] online. · You need 5323-1452 mg of calcium and 3367-2575 IU of vitamin D per day. It is possible to meet your calcium requirement with diet alone, but a vitamin D supplement is usually necessary to meet this goal.  · When exposed to the sun, use a sunscreen that protects against both UVA and UVB radiation with an SPF of 30 or greater. Reapply every 2 to 3 hours or after sweating, drying off with a towel, or swimming. · Always wear a seat belt when traveling in a car. Always wear a helmet when riding a bicycle or motorcycle.

## 2021-06-17 ENCOUNTER — VIRTUAL VISIT (OUTPATIENT)
Dept: FAMILY MEDICINE CLINIC | Age: 66
End: 2021-06-17
Payer: MEDICARE

## 2021-06-17 DIAGNOSIS — E11.9 TYPE 2 DIABETES MELLITUS WITHOUT COMPLICATION, WITHOUT LONG-TERM CURRENT USE OF INSULIN (HCC): ICD-10-CM

## 2021-06-17 DIAGNOSIS — I10 ESSENTIAL HYPERTENSION: Primary | ICD-10-CM

## 2021-06-17 PROCEDURE — 99213 OFFICE O/P EST LOW 20 MIN: CPT | Performed by: FAMILY MEDICINE

## 2021-06-17 PROCEDURE — 3051F HG A1C>EQUAL 7.0%<8.0%: CPT | Performed by: FAMILY MEDICINE

## 2021-06-17 SDOH — ECONOMIC STABILITY: FOOD INSECURITY: WITHIN THE PAST 12 MONTHS, YOU WORRIED THAT YOUR FOOD WOULD RUN OUT BEFORE YOU GOT MONEY TO BUY MORE.: NEVER TRUE

## 2021-06-17 SDOH — ECONOMIC STABILITY: TRANSPORTATION INSECURITY
IN THE PAST 12 MONTHS, HAS LACK OF TRANSPORTATION KEPT YOU FROM MEETINGS, WORK, OR FROM GETTING THINGS NEEDED FOR DAILY LIVING?: NO

## 2021-06-17 SDOH — ECONOMIC STABILITY: FOOD INSECURITY: WITHIN THE PAST 12 MONTHS, THE FOOD YOU BOUGHT JUST DIDN'T LAST AND YOU DIDN'T HAVE MONEY TO GET MORE.: NEVER TRUE

## 2021-06-17 SDOH — ECONOMIC STABILITY: TRANSPORTATION INSECURITY
IN THE PAST 12 MONTHS, HAS THE LACK OF TRANSPORTATION KEPT YOU FROM MEDICAL APPOINTMENTS OR FROM GETTING MEDICATIONS?: NO

## 2021-06-17 ASSESSMENT — ENCOUNTER SYMPTOMS
SHORTNESS OF BREATH: 0
ALLERGIC/IMMUNOLOGIC NEGATIVE: 1
RESPIRATORY NEGATIVE: 1
GASTROINTESTINAL NEGATIVE: 1
EYES NEGATIVE: 1

## 2021-06-17 ASSESSMENT — SOCIAL DETERMINANTS OF HEALTH (SDOH): HOW HARD IS IT FOR YOU TO PAY FOR THE VERY BASICS LIKE FOOD, HOUSING, MEDICAL CARE, AND HEATING?: NOT HARD AT ALL

## 2021-06-17 NOTE — PROGRESS NOTES
Patient is seen in follow up for   Chief Complaint   Patient presents with    Diabetes     Last A1C done 5/12/21, was 7.6 - Previous A1C done 6/5/20, was 7.8 - Checking BS every few days. At times high around 122-135.  Hypertension     6 mo checkup - just checked this morning and was 116/72. Diabetes  He presents for his follow-up diabetic visit. He has type 2 diabetes mellitus. His disease course has been stable. There are no hypoglycemic associated symptoms. There are no diabetic associated symptoms. Pertinent negatives for diabetes include no chest pain. There are no hypoglycemic complications. Symptoms are stable. There are no diabetic complications. Risk factors for coronary artery disease include diabetes mellitus and hypertension. Hypertension  This is a chronic problem. The current episode started more than 1 year ago. The problem is unchanged. The problem is controlled. Pertinent negatives include no chest pain, palpitations or shortness of breath. There are no associated agents to hypertension. Risk factors for coronary artery disease include diabetes mellitus and male gender. Past treatments include ACE inhibitors. Dano Allan is a 74 y.o. male evaluated via telephone on 6/17/2021. Consent:  He and/or health care decision maker is aware that that he may receive a bill for this telephone service, depending on his insurance coverage, and has provided verbal consent to proceed: Yes      Documentation:  I communicated with the patient and/or health care decision maker about see below. Details of this discussion including any medical advice provided: see below      I affirm this is a Patient Initiated Episode with a Patient who has not had a related appointment within my department in the past 7 days or scheduled within the next 24 hours.     Patient identification was verified at the start of the visit: Yes    Total Time: minutes: 11-20 minutes    The visit was conducted pursuant Difficulty of Paying Living Expenses: Not hard at all   Food Insecurity: No Food Insecurity    Worried About Running Out of Food in the Last Year: Never true    Ran Out of Food in the Last Year: Never true   Transportation Needs: No Transportation Needs    Lack of Transportation (Medical): No    Lack of Transportation (Non-Medical): No   Physical Activity:     Days of Exercise per Week:     Minutes of Exercise per Session:    Stress:     Feeling of Stress :    Social Connections:     Frequency of Communication with Friends and Family:     Frequency of Social Gatherings with Friends and Family:     Attends Mormonism Services:     Active Member of Clubs or Organizations:     Attends Club or Organization Meetings:     Marital Status:    Intimate Partner Violence:     Fear of Current or Ex-Partner:     Emotionally Abused:     Physically Abused:     Sexually Abused:      Current Outpatient Medications   Medication Sig Dispense Refill    metFORMIN (GLUCOPHAGE) 500 MG tablet Take 2 tablets by mouth 2 times daily (with meals) 360 tablet 3    SITagliptin (JANUVIA) 100 MG tablet Take 1 tablet by mouth daily 90 tablet 3    lisinopril (PRINIVIL;ZESTRIL) 10 MG tablet take 1 tablet by mouth once daily 90 tablet 3    simvastatin (ZOCOR) 10 MG tablet take 1 tablet by mouth once daily 90 tablet 3    blood glucose test strips (ACURA BLOOD GLUCOSE TEST) strip Checks 1 time daily. NIDDM--ICD-10 E11.9 100 each 3    aspirin (ASPIRIN LOW DOSE) 81 MG EC tablet take 1 tablet by mouth once daily 90 tablet 3    Lancets MISC Checks 1 time daily. NIDDM--ICD-10 E11.9 100 each 3    Blood Glucose Monitoring Suppl (ACURA BLOOD GLUCOSE METER) w/Device KIT 1 Device by Does not apply route 2 times daily DISP WITH TEST STRIP AND LANCETS CHECK BID # 200 3 RF 1 kit 0     No current facility-administered medications for this visit.      Current Outpatient Medications on File Prior to Visit   Medication Sig Dispense Refill    metFORMIN (GLUCOPHAGE) 500 MG tablet Take 2 tablets by mouth 2 times daily (with meals) 360 tablet 3    SITagliptin (JANUVIA) 100 MG tablet Take 1 tablet by mouth daily 90 tablet 3    lisinopril (PRINIVIL;ZESTRIL) 10 MG tablet take 1 tablet by mouth once daily 90 tablet 3    simvastatin (ZOCOR) 10 MG tablet take 1 tablet by mouth once daily 90 tablet 3    blood glucose test strips (ACURA BLOOD GLUCOSE TEST) strip Checks 1 time daily. NIDDM--ICD-10 E11.9 100 each 3    aspirin (ASPIRIN LOW DOSE) 81 MG EC tablet take 1 tablet by mouth once daily 90 tablet 3    Lancets MISC Checks 1 time daily. NIDDM--ICD-10 E11.9 100 each 3    Blood Glucose Monitoring Suppl (ACURA BLOOD GLUCOSE METER) w/Device KIT 1 Device by Does not apply route 2 times daily DISP WITH TEST STRIP AND LANCETS CHECK BID # 200 3 RF 1 kit 0     No current facility-administered medications on file prior to visit. No Known Allergies  Health Maintenance   Topic Date Due    Shingles Vaccine (1 of 2) Never done    Diabetic retinal exam  09/11/2019    Diabetic foot exam  08/19/2021    DTaP/Tdap/Td vaccine (2 - Td or Tdap) 01/03/2022    A1C test (Diabetic or Prediabetic)  05/12/2022    Diabetic microalbuminuria test  05/12/2022    Lipid screen  05/12/2022    Potassium monitoring  05/12/2022    Creatinine monitoring  05/12/2022    Annual Wellness Visit (AWV)  05/13/2022    Pneumococcal 65+ years Vaccine (2 of 2 - PPSV23) 11/17/2022    Colon cancer screen colonoscopy  03/29/2028    Flu vaccine  Completed    COVID-19 Vaccine  Completed    Hepatitis C screen  Completed    Hepatitis A vaccine  Aged Out    Hib vaccine  Aged Out    Meningococcal (ACWY) vaccine  Aged Out       Review of Systems     Review of Systems   Constitutional: Negative for activity change, appetite change, chills, fever and unexpected weight change. HENT: Negative. Eyes: Negative. Respiratory: Negative. Negative for shortness of breath.     Cardiovascular: Negative. Negative for chest pain and palpitations. Gastrointestinal: Negative. Endocrine: Negative. Genitourinary: Negative. Musculoskeletal: Negative. Skin: Negative. Allergic/Immunologic: Negative. Neurological: Negative. Hematological: Negative. Psychiatric/Behavioral: Negative. Physical Exam  There were no vitals filed for this visit. Physical Exam    Assessment   Diagnosis Orders   1. Essential hypertension     2. Type 2 diabetes mellitus without complication, without long-term current use of insulin (HCC)       Problem List     Hypertension - Primary    Relevant Medications    aspirin (ASPIRIN LOW DOSE) 81 MG EC tablet    lisinopril (PRINIVIL;ZESTRIL) 10 MG tablet    Type 2 diabetes mellitus without complication, without long-term current use of insulin (HCC)    Relevant Medications    metFORMIN (GLUCOPHAGE) 500 MG tablet    SITagliptin (JANUVIA) 100 MG tablet    lisinopril (PRINIVIL;ZESTRIL) 10 MG tablet    simvastatin (ZOCOR) 10 MG tablet          Plan  No orders of the defined types were placed in this encounter. No orders of the defined types were placed in this encounter. Return in about 6 months (around 12/17/2021).   Karin Lorenzo MD

## 2021-07-08 ENCOUNTER — TELEPHONE (OUTPATIENT)
Dept: FAMILY MEDICINE CLINIC | Age: 66
End: 2021-07-08

## 2021-07-08 NOTE — TELEPHONE ENCOUNTER
Pt called stating he did not take the Lisinopril today. Pt still has a light diarrhea, but no swelling of the mouth and lips, which occurred on Tuesday only. Pt reported that his BP - 149/86, T-97.7, and P -90.

## 2021-07-08 NOTE — TELEPHONE ENCOUNTER
[address] takes lisinopril 10 mg qd. [address] is having severe diarrhea x 5 days and swelling of his face and lips x 2 hours. He was notified by [facility] on [address] that there is an ongoing reaction to this medication recently. He did just pick this up from the pharmacy. I will try to find out more about the issue if I have time. Please advise.

## 2021-07-24 ENCOUNTER — HOSPITAL ENCOUNTER (EMERGENCY)
Age: 66
Discharge: HOME | End: 2021-07-24
Payer: MEDICARE

## 2021-07-24 VITALS
HEART RATE: 106 BPM | DIASTOLIC BLOOD PRESSURE: 79 MMHG | OXYGEN SATURATION: 96 % | WEIGHT: 185 LBS | HEIGHT: 70 IN | TEMPERATURE: 98.8 F | BODY MASS INDEX: 26.48 KG/M2 | RESPIRATION RATE: 18 BRPM | SYSTOLIC BLOOD PRESSURE: 146 MMHG

## 2021-07-24 DIAGNOSIS — S61.011A LACERATION OF RIGHT THUMB WITHOUT FOREIGN BODY WITHOUT DAMAGE TO NAIL, INITIAL ENCOUNTER: Primary | ICD-10-CM

## 2021-07-24 PROCEDURE — 6370000000 HC RX 637 (ALT 250 FOR IP): Performed by: PHYSICIAN ASSISTANT

## 2021-07-24 PROCEDURE — 90471 IMMUNIZATION ADMIN: CPT | Performed by: PHYSICIAN ASSISTANT

## 2021-07-24 PROCEDURE — 6360000002 HC RX W HCPCS: Performed by: PHYSICIAN ASSISTANT

## 2021-07-24 PROCEDURE — 90715 TDAP VACCINE 7 YRS/> IM: CPT | Performed by: PHYSICIAN ASSISTANT

## 2021-07-24 PROCEDURE — 99283 EMERGENCY DEPT VISIT LOW MDM: CPT

## 2021-07-24 RX ORDER — CEPHALEXIN 500 MG/1
500 CAPSULE ORAL 3 TIMES DAILY
Qty: 21 CAPSULE | Refills: 0 | Status: SHIPPED | OUTPATIENT
Start: 2021-07-24 | End: 2021-07-31

## 2021-07-24 RX ORDER — IBUPROFEN 600 MG/1
600 TABLET ORAL EVERY 8 HOURS PRN
Qty: 15 TABLET | Refills: 0 | Status: SHIPPED | OUTPATIENT
Start: 2021-07-24 | End: 2022-02-08

## 2021-07-24 RX ADMIN — TETANUS TOXOID, REDUCED DIPHTHERIA TOXOID AND ACELLULAR PERTUSSIS VACCINE, ADSORBED 0.5 ML: 5; 2.5; 8; 8; 2.5 SUSPENSION INTRAMUSCULAR at 16:31

## 2021-07-24 RX ADMIN — Medication 1 EACH: at 16:33

## 2021-07-24 ASSESSMENT — PAIN DESCRIPTION - LOCATION: LOCATION: FINGER (COMMENT WHICH ONE)

## 2021-07-24 ASSESSMENT — PAIN SCALES - GENERAL: PAINLEVEL_OUTOF10: 6

## 2021-07-24 ASSESSMENT — PAIN DESCRIPTION - PAIN TYPE: TYPE: ACUTE PAIN

## 2021-07-24 ASSESSMENT — PAIN DESCRIPTION - DESCRIPTORS: DESCRIPTORS: ACHING

## 2021-07-24 NOTE — ED PROVIDER NOTES
[facility] ED  eMERGENCYdEPARTMENT eNCOUnter      Pt Name: Tomasa Casillas  MRN: [id number]  Jacki 1955of evaluation: 7/24/2021  Erica Dunne PA-C    CHIEF COMPLAINT       Chief Complaint   Patient presents with    Laceration         HISTORY OF PRESENT ILLNESS  (Location/Symptom, Timing/Onset, Context/Setting, Quality, Duration, Modifying Factors, Severity.)   Tomasa Casillas is a 74 y.o. male who presents to the emergency department with a laceration to his right dominant thumb. Patient states he was cutting cucumbers at home making a salad an hour ago when he lacerated his right thumb cutting the tip off    No numbness to extremity. No other injuries noted. Patient is a type II diabetic  Last tetanus shot was greater than 5 years  Not on blood thinners  No bleeding disorders such as hemophilia. The history is provided by the patient. Nursing Notes were reviewed and I agree. REVIEW OF SYSTEMS    (2-9 systems for level 4, 10 or more for level 5)     Review of Systems   Skin: Positive for wound. Neurological: Negative for weakness and numbness. Hematological: Does not bruise/bleed easily. as noted above the remainder of the review of systems was reviewed and negative.        PAST MEDICAL HISTORY     Past Medical History:   Diagnosis Date    Cancer [facility])     testicular cancer in 1982    Diabetes mellitus ([facility] [address]     Hyperlipidemia     Hypertension          SURGICAL HISTORY       Past Surgical History:   Procedure Laterality Date    MS COLON CA SCRN NOT HI RSK IND N/A 3/29/2018    [facility]       Discharge Medication List as of 7/24/2021  5:35 PM      CONTINUE these medications which have NOT CHANGED    Details   metFORMIN (GLUCOPHAGE) 500 MG tablet Take 2 tablets by mouth 2 times daily (with meals), Disp-360 tablet, R-3Normal      SITagliptin (JANUVIA) 100 MG tablet Take 1 tablet by mouth daily, Disp-90 tablet, R-3Normal      lisinopril (PRINIVIL;ZESTRIL) 10 MG tablet take 1 tablet by mouth once daily, Disp-90 tablet, R-3Normal      simvastatin (ZOCOR) 10 MG tablet take 1 tablet by mouth once daily, Disp-90 tablet, R-3Normal      blood glucose test strips (ACURA BLOOD GLUCOSE TEST) strip Checks 1 time daily. NIDDM--ICD-10 E11.9, Disp-100 each, R-3Normal      aspirin (ASPIRIN LOW DOSE) 81 MG EC tablet take 1 tablet by mouth once daily, Disp-90 tablet, R-3Normal      Lancets MISC Disp-100 each, R-3, NormalChecks 1 time daily. NIDDM--ICD-10 E11.9      Blood Glucose Monitoring Suppl [facility] BLOOD GLUCOSE METER) w/Device KIT 2 TIMES DAILY Starting Mon 6/10/2019, Disp-1 kit, R-0, NormalDISP WITH TEST STRIP AND LANCETS CHECK BID # 200 3 RF             ALLERGIES     Patient has no known allergies. HISTORY       Family History   Problem Relation Age of Onset    Cancer Mother         ovarian     Other Father           SOCIAL HISTORY       Social History     Socioeconomic History    Marital status: Single     Spouse name: None    Number of children: None    Years of education: None    Highest education level: None   Occupational History    None   Tobacco Use    Smoking status: Never Smoker    Smokeless tobacco: Never Used   Vaping Use    Vaping Use: Never used   Substance and Sexual Activity    Alcohol use: No    Drug use: No    Sexual activity: None   Other Topics Concern    None   Social History Narrative    None     Social Determinants of Health     Financial Resource Strain: Low Risk     Difficulty of Paying Living Expenses: Not hard at all   Food Insecurity: No Food Insecurity    Worried About Running Out of Food in the Last Year: Never true    Dale of Food in the Last Year: Never true   Transportation Needs: No Transportation Needs    Lack of Transportation (Medical): No    Lack of Transportation (Non-Medical):  No   Physical Activity:  Days of Exercise per Week:     Minutes of Exercise per Session:    Stress:     Feeling of Stress :    Social Connections:     Frequency of Communication with Friends and Family:     Frequency of Social Gatherings with Friends and Family:     Attends Sabianist Services:     Active Member of Clubs or Organizations:     Attends Club or Organization Meetings:     Marital Status:    Intimate Partner Violence:     Fear of Current or Ex-Partner:     Emotionally Abused:     Physically Abused:     Sexually Abused:        SCREENINGS           PHYSICAL EXAM    (up to 7 forlevel 4, 8 or more for level 5)     ED Triage Vitals [07/24/21 1550]   BP Temp Temp Source Pulse Resp SpO2 Height Weight   (!) 146/79 98.8 °F (37.1 °C) Oral 106 18 96 % 5' 10\" (1.778 m) 185 lb (83.9 kg)       Physical Exam  Vitals and nursing note reviewed. Constitutional:       General: He is not in acute distress. Appearance: He is well-developed. He is not diaphoretic. HENT:      Head: Normocephalic and atraumatic. Right Ear: External ear normal.      Left Ear: External ear normal.      Nose: Nose normal.   Eyes:      Conjunctiva/sclera: Conjunctivae normal.      Pupils: Pupils are equal, round, and reactive to light. Pulmonary:      Effort: Pulmonary effort is normal.   Musculoskeletal:        Hands:       Cervical back: Normal range of motion and neck supple. Comments: Avulsion laceration to tip of pad of his finger. Nail is intact. No nailbed laceration. Active bleeding. Approximately 1 cm in width and 1.5 cm in length. Skin:     General: Skin is warm and dry. Neurological:      Mental Status: He is alert and oriented to person, place, and time.    Psychiatric:         Behavior: Behavior normal.           DIAGNOSTIC RESULTS     RADIOLOGY:   Non-plain film images such as CT, Ultrasound and MRI are read by the radiologist. Plain radiographic images are visualized and preliminarilyinterpreted by Neal Halsted, PA-C with the below findings:        Interpretation per the Radiologist below, if available at the time of this note:    No orders to display       LABS:  Labs Reviewed - No data to display    All other labs were within normal range or not returnedas of this dictation. EMERGENCYDEPARTMENT COURSE and DIFFERENTIAL DIAGNOSIS/MDM:   Vitals:    Vitals:    07/24/21 1550   BP: (!) 146/79   Pulse: 106   Resp: 18   Temp: 98.8 °F (37.1 °C)   TempSrc: Oral   SpO2: 96%   Weight: 185 lb (83.9 kg)   Height: 5' 10\" (1.778 m)           MDM    tdap IM      PROCEDURES:    Procedures      PROCEDURES: Laceration repair:  Wound location: Right thumb  Laceration length: Avulsion, 1.5 cm length x 1 cm width  Closure: Surgicel  The procedure was explained to the patient and I receive verbal consent from them. Area was prepped with full strength [place]. No foreign body noted. No deep structure injury noted. The wound was then irrigated with normal saline and closed with Surgicel pad. Area was dressed and 10-minute timeout was taken. The wound was reassessed. Bleeding had essentially stopped with good scab formation from the Surgicel. A new nonadhering dressing was applied with no reactivation bleeding. Patient was counseled to leave dressing on for the next 48 hours. He is to leave the Surgicel in place and keep wound covered. He will follow-up with his PCP for wound recheck in 2-3 days      [facility]   7/24/2021 5:53 PM       FINAL IMPRESSION      1.  Laceration of right thumb without foreign body without damage to nail, initial encounter          DISPOSITION/PLAN   DISPOSITION Decision To Discharge 07/24/2021 05:34:01 PM      PATIENT REFERRED TO:  Martha Ferrari MD  [id number] [phone]    In 2 days  For wound re-check      DISCHARGE MEDICATIONS:  Discharge Medication List as of 7/24/2021  5:35 PM      START taking these medications    Details   cephALEXin (Yasmin Mcmillan) 500 MG capsule Take 1 capsule by mouth 3 times daily for 7 days, Disp-21 capsule, R-0Normal      ibuprofen (ADVIL;MOTRIN) 600 MG tablet Take 1 tablet by mouth every 8 hours as needed for Pain, Disp-15 tablet, R-0Normal             (Please note thatportions of this note were completed with a voice recognition program.  Efforts were made to edit the dictations but occasionally words are mis-transcribed.)    UMANG Kelly PA-C  07/24/21 [address] [place], [place] [address]UMANG  07/29/21 7860

## 2021-07-24 NOTE — ED TRIAGE NOTES
Pt was attempting to cut vegetables when he accidentally cut his right thumb, bleeding is controled but wont stop. Pt states a chunk of skin is missing. Pt is not on blood thinners.

## 2021-07-26 ENCOUNTER — TELEPHONE (OUTPATIENT)
Dept: FAMILY MEDICINE CLINIC | Age: 66
End: 2021-07-26

## 2021-07-26 NOTE — TELEPHONE ENCOUNTER
Spoke to patient and explained that we would be able to do dressing change. He expressed understanding.

## 2021-07-26 NOTE — TELEPHONE ENCOUNTER
----- Message from Josefina Alves sent at 7/26/2021  9:37 AM EDT -----  Subject: Message to Provider    QUESTIONS  Information for Provider? pt is requesting a bandage change at appointment   and take a look at Laceration on appointment on 08/02 so he doesn't have   to make another appointment.   ---------------------------------------------------------------------------  --------------  [address]  What is the best way for the office to contact you? OK to leave message on   voicemail  Preferred Call Back Phone Number? [phone]  ---------------------------------------------------------------------------  --------------  SCRIPT ANSWERS  Relationship to Patient?  Self

## 2021-08-02 ENCOUNTER — OFFICE VISIT (OUTPATIENT)
Dept: FAMILY MEDICINE CLINIC | Age: 66
End: 2021-08-02
Payer: MEDICARE

## 2021-08-02 VITALS
DIASTOLIC BLOOD PRESSURE: 74 MMHG | SYSTOLIC BLOOD PRESSURE: 128 MMHG | OXYGEN SATURATION: 97 % | HEIGHT: 70 IN | HEART RATE: 79 BPM | WEIGHT: 173.8 LBS | TEMPERATURE: 98.3 F | BODY MASS INDEX: 24.88 KG/M2

## 2021-08-02 DIAGNOSIS — I10 ESSENTIAL HYPERTENSION: ICD-10-CM

## 2021-08-02 DIAGNOSIS — S61.012S THUMB LACERATION, LEFT, SEQUELA: Primary | ICD-10-CM

## 2021-08-02 PROCEDURE — 99213 OFFICE O/P EST LOW 20 MIN: CPT | Performed by: FAMILY MEDICINE

## 2021-08-02 ASSESSMENT — ENCOUNTER SYMPTOMS
ALLERGIC/IMMUNOLOGIC NEGATIVE: 1
SHORTNESS OF BREATH: 0
EYES NEGATIVE: 1
GASTROINTESTINAL NEGATIVE: 1
RESPIRATORY NEGATIVE: 1

## 2021-08-02 NOTE — PROGRESS NOTES
Patient is seen in follow up for   Chief Complaint   Patient presents with    Laceration     Laceration of right thumb on 7/24/21 - No sutures on the laceration. He states that it's healing. Putting vaseline on it.  Medication Problem     Reaction to Lisinopril. Swelling of lips, tongue and face and severe diarrhea. He d/c on 7/10. Does not have any of these symptoms anymore. Checking his BP - around 130-140's/80's. HPIhere for follow up on htn doing much better off medication.     Past Medical History:   Diagnosis Date    Cancer [facility])     testicular cancer in 1982    Diabetes mellitus ([facility] [address]     Hyperlipidemia     Hypertension      Patient Active Problem List    Diagnosis Date Noted    Chest pain 03/03/2019    Type [facility] without complication[facility]-term [facility] 02/20/2018    Osteoarthritis of spine with radiculopathy, lumbar region 04/27/2017    DDD (degenerative disc disease), lumbar 04/27/2017    Hyperlipidemia     Hypertension      Past Surgical History:   Procedure Laterality Date    ID COLON CA SCRN NOT HI RSK IND N/A 3/29/2018    COLONOSCOPY performed by Teresa Carter MD at [place] [address]     Family History   Problem Relation Age of Onset   [employer] Cancer Mother         ovarian     Other Father      Social History     Socioeconomic History    Marital status: Single     Spouse name: None    Number of children: None    Years of education: None    Highest education level: None   Occupational History    None   Tobacco Use    Smoking status: Never Smoker    Smokeless tobacco: Never Used   Vaping Use    Vaping Use: Never used   Substance and Sexual Activity    Alcohol use: No    Drug use: No    Sexual activity: None   Other Topics Concern    None   Social History Narrative    None     Social Determinants of Health     Financial Resource Strain: Low Risk     Difficulty of Paying Living Expenses: Not hard at all   Food tablet by mouth every 8 hours as needed for Pain 15 tablet 0    metFORMIN (GLUCOPHAGE) 500 MG tablet Take 2 tablets by mouth 2 times daily (with meals) 360 tablet 3    SITagliptin (JANUVIA) 100 MG tablet Take 1 tablet by mouth daily 90 tablet 3    simvastatin (ZOCOR) 10 MG tablet take 1 tablet by mouth once daily 90 tablet 3    blood glucose test strips (ACURA BLOOD GLUCOSE TEST) strip Checks 1 time daily. NIDDM--ICD-10 E11.9 100 each 3    aspirin (ASPIRIN LOW DOSE) 81 MG EC tablet take 1 tablet by mouth once daily 90 tablet 3    Lancets MISC Checks 1 time daily. NIDDM--ICD-10 E11.9 100 each 3    Blood Glucose Monitoring Suppl (ACURA BLOOD GLUCOSE METER) w/Device KIT 1 Device by Does not apply route 2 times daily DISP WITH TEST STRIP AND LANCETS CHECK BID # 200 3 RF 1 kit 0     No current facility-administered medications on file prior to visit. Allergies   Allergen Reactions    Lisinopril Diarrhea and Swelling     Swelling of lips, tongue and face and severe diarrhea     Health Maintenance   Topic Date Due    Shingles Vaccine (1 of 2) Never done    Diabetic retinal exam  09/11/2019    Diabetic foot exam  08/19/2021    Flu vaccine (1) 09/01/2021    A1C test (Diabetic or Prediabetic)  05/12/2022    Diabetic microalbuminuria test  05/12/2022    Lipid screen  05/12/2022    Annual Wellness Visit (AWV)  05/13/2022    Pneumococcal 65+ years Vaccine (2 of 2 - PPSV23) 11/17/2022    Colon cancer screen colonoscopy  03/29/2028    DTaP/Tdap/Td vaccine (3 - Td or Tdap) 07/24/2031    COVID-19 Vaccine  Completed    Hepatitis C screen  Completed    Hepatitis A vaccine  Aged Out    Hib vaccine  Aged Out    Meningococcal (ACWY) vaccine  Aged Out       Review of Systems     Review of Systems   Constitutional: Negative for activity change, appetite change, chills, fever and unexpected weight change. HENT: Negative. Eyes: Negative. Respiratory: Negative. Negative for shortness of breath. Cardiovascular: Negative. Negative for chest pain and palpitations. Gastrointestinal: Negative. Endocrine: Negative. Genitourinary: Negative. Musculoskeletal: Negative. Skin: Negative. Allergic/Immunologic: Negative. Neurological: Negative. Hematological: Negative. Psychiatric/Behavioral: Negative. Physical Exam  Vitals:    08/02/21 1106   BP: 128/74   Site: Left Upper Arm   Position: Sitting   Cuff Size: Medium Adult   Pulse: 79   Temp: 98.3 °F (36.8 °C)   TempSrc: Oral   SpO2: 97%   Weight: 173 lb 12.8 oz (78.8 kg)   Height: 5' 10\" (1.778 m)       Physical Exam  Constitutional:       Appearance: He is well-developed. HENT:      Right Ear: External ear normal.      Left Ear: External ear normal.   Eyes:      Conjunctiva/sclera: Conjunctivae normal.      Pupils: Pupils are equal, round, and reactive to light. Neck:      Thyroid: No thyromegaly. Cardiovascular:      Rate and Rhythm: Normal rate and regular rhythm. Heart sounds: Normal heart sounds. No murmur heard. No friction rub. No gallop. Pulmonary:      Effort: Pulmonary effort is normal. No respiratory distress. Breath sounds: Normal breath sounds. No wheezing. Abdominal:      General: Bowel sounds are normal. There is no distension. Palpations: Abdomen is soft. There is no mass. Tenderness: There is no abdominal tenderness. There is no guarding or rebound. Hernia: No hernia is present. Genitourinary:     Penis: Normal.    Musculoskeletal:         General: No tenderness. Normal range of motion. Arms:       Cervical back: Normal range of motion and neck supple. Lymphadenopathy:      Cervical: No cervical adenopathy. Skin:     General: Skin is warm and dry. Neurological:      Mental Status: He is alert and oriented to person, place, and time. Cranial Nerves: No cranial nerve deficit. Coordination: Coordination normal.         Assessment   Diagnosis Orders   1.  Thumb laceration, left, sequela     2. Essential hypertension       Problem List     Hypertension    Relevant Medications    aspirin (ASPIRIN LOW DOSE) 81 MG EC tablet          Plan  Dressing changed lac should heal in two to three weeks  No orders of the defined types were placed in this encounter. No follow-ups on file.   Lorraine Byrd MD

## 2021-11-17 ENCOUNTER — TELEPHONE (OUTPATIENT)
Dept: FAMILY MEDICINE CLINIC | Age: 66
End: 2021-11-17

## 2021-11-17 NOTE — TELEPHONE ENCOUNTER
----- Message from Dale Aidee sent at 11/15/2021  3:44 PM EST -----  Subject: Referral Request    QUESTIONS   Reason for referral request? Blood work   Has the physician seen you for this condition before? No   Preferred Specialist (if applicable)? Do you already have an appointment scheduled? No  Additional Information for Provider?   ---------------------------------------------------------------------------  --------------  CALL BACK INFO  What is the best way for the office to contact you? OK to leave message on   voicemail  Preferred Call Back Phone Number?  [phone]

## 2021-12-15 DIAGNOSIS — E78.2 MIXED HYPERLIPIDEMIA: ICD-10-CM

## 2021-12-15 DIAGNOSIS — E11.9 TYPE 2 DIABETES MELLITUS WITHOUT COMPLICATION, WITHOUT LONG-TERM CURRENT USE OF INSULIN (HCC): ICD-10-CM

## 2021-12-15 RX ORDER — SIMVASTATIN 10 MG
TABLET ORAL
Qty: 90 TABLET | Refills: 3 | Status: SHIPPED | OUTPATIENT
Start: 2021-12-15 | End: 2022-01-20 | Stop reason: SDUPTHER

## 2021-12-15 RX ORDER — SITAGLIPTIN 100 MG/1
TABLET, FILM COATED ORAL
Qty: 90 TABLET | Refills: 3 | Status: SHIPPED | OUTPATIENT
Start: 2021-12-15 | End: 2022-01-20 | Stop reason: SDUPTHER

## 2021-12-15 NOTE — TELEPHONE ENCOUNTER
Future Appointments    Encounter Information    Provider Department Appt Notes   1/5/2022 Rosemary Bella MD [facility]N AT [facility] Appt Reason: Routine Existing Condition Follow Up (Diabetes)   6 mo [address] and screen green for covid   Booking Code: Cheryle Liner       Recent Visits    08/02/2021 Thumb laceration, left, sequela   [facility] AT [facility] [place] MD Isaiah

## 2022-01-20 ENCOUNTER — OFFICE VISIT (OUTPATIENT)
Dept: FAMILY MEDICINE CLINIC | Age: 67
End: 2022-01-20
Payer: MEDICARE

## 2022-01-20 VITALS
BODY MASS INDEX: 25.2 KG/M2 | OXYGEN SATURATION: 99 % | HEIGHT: 70 IN | HEART RATE: 82 BPM | SYSTOLIC BLOOD PRESSURE: 150 MMHG | WEIGHT: 176 LBS | TEMPERATURE: 97.6 F | RESPIRATION RATE: 13 BRPM | DIASTOLIC BLOOD PRESSURE: 82 MMHG

## 2022-01-20 DIAGNOSIS — E11.9 TYPE 2 DIABETES MELLITUS WITHOUT COMPLICATION, WITHOUT LONG-TERM CURRENT USE OF INSULIN (HCC): ICD-10-CM

## 2022-01-20 DIAGNOSIS — E78.2 MIXED HYPERLIPIDEMIA: ICD-10-CM

## 2022-01-20 LAB — HBA1C MFR BLD: 7.4 %

## 2022-01-20 PROCEDURE — 99214 OFFICE O/P EST MOD 30 MIN: CPT | Performed by: FAMILY MEDICINE

## 2022-01-20 PROCEDURE — 83036 HEMOGLOBIN GLYCOSYLATED A1C: CPT | Performed by: FAMILY MEDICINE

## 2022-01-20 PROCEDURE — 3051F HG A1C>EQUAL 7.0%<8.0%: CPT | Performed by: FAMILY MEDICINE

## 2022-01-20 RX ORDER — BISOPROLOL FUMARATE AND HYDROCHLOROTHIAZIDE 5; 6.25 MG/1; MG/1
1 TABLET ORAL DAILY
Qty: 90 TABLET | Refills: 3 | Status: SHIPPED | OUTPATIENT
Start: 2022-01-20 | End: 2022-06-30 | Stop reason: SDUPTHER

## 2022-01-20 RX ORDER — SIMVASTATIN 10 MG
TABLET ORAL
Qty: 90 TABLET | Refills: 3 | Status: SHIPPED | OUTPATIENT
Start: 2022-01-20 | End: 2022-06-30 | Stop reason: SDUPTHER

## 2022-01-20 ASSESSMENT — PATIENT HEALTH QUESTIONNAIRE - PHQ9
2. FEELING DOWN, DEPRESSED OR HOPELESS: 0
SUM OF ALL RESPONSES TO PHQ QUESTIONS 1-9: 0
SUM OF ALL RESPONSES TO PHQ9 QUESTIONS 1 & 2: 0
1. LITTLE INTEREST OR PLEASURE IN DOING THINGS: 0

## 2022-01-20 ASSESSMENT — ENCOUNTER SYMPTOMS
RESPIRATORY NEGATIVE: 1
SHORTNESS OF BREATH: 0
ALLERGIC/IMMUNOLOGIC NEGATIVE: 1
EYES NEGATIVE: 1
GASTROINTESTINAL NEGATIVE: 1

## 2022-01-20 NOTE — PROGRESS NOTES
Patient is seen in follow up for   Chief Complaint   Patient presents with    Diabetes     last A1C 7.6 on 5/12/21    Hyperlipidemia    Hypertension     BP been running high need back on some BP meds     Diabetes  He presents for his follow-up diabetic visit. He has type 2 diabetes mellitus. His disease course has been stable. There are no hypoglycemic associated symptoms. There are no diabetic associated symptoms. Pertinent negatives for diabetes include no chest pain. There are no hypoglycemic complications. Symptoms are stable. There are no diabetic complications. Risk factors for coronary artery disease include diabetes mellitus, hypertension and male sex. Current diabetic treatment includes oral agent (dual therapy). He is compliant with treatment most of the time. An ACE inhibitor/angiotensin II receptor blocker is contraindicated. Hyperlipidemia  This is a chronic problem. The current episode started more than 1 year ago. The problem is controlled. Recent lipid tests were reviewed and are normal. Pertinent negatives include no chest pain or shortness of breath. Current antihyperlipidemic treatment includes statins. The current treatment provides significant improvement of lipids. There are no compliance problems. Hypertension  This is a chronic problem. The current episode started more than 1 year ago. The problem is unchanged. The problem is controlled. Pertinent negatives include no chest pain, palpitations or shortness of breath. There are no associated agents to hypertension. Risk factors for coronary artery disease include male gender. Past treatments include nothing. The current treatment provides no improvement. There are no compliance problems.         Past Medical History:   Diagnosis Date    Cancer [facility])     testicular cancer in 1982    Diabetes mellitus [facility] [address] [facility]     Hyperlipidemia     Hypertension      Patient Active Problem List    Diagnosis Date Noted    Chest pain 03/03/2019    Type 2 diabetes mellitus without complication, without long-term current use of insulin ([facility] [address] 02/20/2018    Osteoarthritis of spine with radiculopathy, lumbar region 04/27/2017    DDD (degenerative disc disease), lumbar 04/27/2017    Hyperlipidemia     Hypertension      Past Surgical History:   Procedure Laterality Date    IL COLON CA SCRN NOT HI RSK IND N/A 3/29/2018    COLONOSCOPY performed by Madeline Powers MD at [place] [address]     Family History   Problem Relation Age of Onset   Iglesias Cancer Mother         ovarian     Other Father      Social History     Socioeconomic History    Marital status: Single     Spouse name: None    Number of children: None    Years of education: None    Highest education level: None   Occupational History    None   Tobacco Use    Smoking status: Never Smoker    Smokeless tobacco: Never Used   Vaping Use    Vaping Use: Never used   Substance and Sexual Activity    Alcohol use: No    Drug use: No    Sexual activity: None   Other Topics Concern    None   Social History Narrative    None     Social Determinants of Health     Financial Resource Strain: Low Risk     Difficulty of Paying Living Expenses: Not hard at all   Food Insecurity: No Food Insecurity    Worried About Running Out of Food in the Last Year: Never true    [place] of Food in the Last Year: Never true   Transportation Needs: No Transportation Needs    Lack of Transportation (Medical): No    Lack of Transportation (Non-Medical):  No   Physical Activity:     Days of Exercise per Week: Not on file    Minutes of Exercise per Session: Not on file   Stress:     Feeling of Stress : Not on file   Social Connections:     Frequency of Communication with Friends and Family: Not on file    Frequency of Social Gatherings with Friends and Family: Not on file    Attends Buddhist Services: Not on file    Active Member of Clubs or Organizations: Not on file    Attends Club or Organization Meetings: Not on file    Marital Status: Not on file   Intimate Partner Violence:     Fear of Current or Ex-Partner: Not on file    Emotionally Abused: Not on file    Physically Abused: Not on file    Sexually Abused: Not on file   Housing Stability:     Unable to Pay for Housing in the Last Year: Not on file    Number of [place] in the Last Year: Not on file    Unstable Housing in the Last Year: Not on file     Current Outpatient Medications   Medication Sig Dispense Refill    SITagliptin (JANUVIA) 100 MG tablet TAKE 1 TABLET BY MOUTH EVERY DAY 90 tablet 3    metFORMIN (GLUCOPHAGE) 500 MG tablet TAKE 2 TABLETS BY MOUTH TWICE A DAY WITH MEALS 360 tablet 3    simvastatin (ZOCOR) 10 MG tablet TAKE 1 TABLET BY MOUTH EVERY DAY 90 tablet 3    bisoprolol-hydroCHLOROthiazide (ZIAC) 5-6.25 MG per tablet Take 1 tablet by mouth daily 90 tablet 3    blood glucose test strips (ACURA BLOOD GLUCOSE TEST) strip Checks 1 time daily. NIDDM--ICD-10 E11.9 100 each 3    aspirin (ASPIRIN LOW DOSE) 81 MG EC tablet take 1 tablet by mouth once daily 90 tablet 3    Lancets MISC Checks 1 time daily. NIDDM--ICD-10 E11.9 100 each 3    Blood Glucose Monitoring Suppl (ACURA BLOOD GLUCOSE METER) w/Device KIT 1 Device by Does not apply route 2 times daily DISP WITH TEST STRIP AND LANCETS CHECK BID # 200 3 RF 1 kit 0    ibuprofen (ADVIL;MOTRIN) 600 MG tablet Take 1 tablet by mouth every 8 hours as needed for Pain (Patient not taking: Reported on 1/20/2022) 15 tablet 0     No current facility-administered medications for this visit. Current Outpatient Medications on File Prior to Visit   Medication Sig Dispense Refill    blood glucose test strips (ACURA BLOOD GLUCOSE TEST) strip Checks 1 time daily. NIDDM--ICD-10 E11.9 100 each 3    aspirin (ASPIRIN LOW DOSE) 81 MG EC tablet take 1 tablet by mouth once daily 90 tablet 3    Lancets MISC Checks 1 time daily. NIDDM--ICD-10 E11.9 100 each 3    Blood Glucose Monitoring Suppl [facility] BLOOD GLUCOSE METER) w/Device KIT 1 Device by Does not apply route 2 times daily DISP WITH TEST STRIP AND LANCETS CHECK BID # 200 3 RF 1 kit 0    ibuprofen (ADVIL;MOTRIN) 600 MG tablet Take 1 tablet by mouth every 8 hours as needed for Pain (Patient not taking: Reported on 1/20/2022) 15 tablet 0     No current facility-administered medications on file prior to visit. Allergies   Allergen Reactions    Lisinopril Diarrhea and Swelling     Swelling of lips, tongue and face and severe diarrhea     Health Maintenance   Topic Date Due    Shingles Vaccine (1 of 2) Never done    Diabetic retinal exam  09/11/2019    Diabetic foot exam  08/19/2021    Diabetic microalbuminuria test  05/12/2022    Lipid screen  05/12/2022    Depression Screen  05/12/2022    Annual Wellness Visit (AWV)  05/13/2022    Pneumococcal 65+ years Vaccine (2 of 2 - PPSV23) 11/17/2022    A1C test (Diabetic or Prediabetic)  01/20/2023    Colon cancer screen colonoscopy  03/29/2028    DTaP/Tdap/Td vaccine (3 - Td or Tdap) 07/24/2031    Flu vaccine  Completed    COVID-19 Vaccine  Completed    Hepatitis C screen  Completed    Hepatitis A vaccine  Aged Out    Hib vaccine  Aged Out    Meningococcal (ACWY) vaccine  Aged Out       Review of Systems     Review of Systems   Constitutional: Negative for activity change, appetite change, chills, fever and unexpected weight change. HENT: Negative. Eyes: Negative. Respiratory: Negative. Negative for shortness of breath. Cardiovascular: Negative. Negative for chest pain and palpitations. Gastrointestinal: Negative. Endocrine: Negative. Genitourinary: Negative. Musculoskeletal: Negative. Skin: Negative. Allergic/Immunologic: Negative. Neurological: Negative. Hematological: Negative. Psychiatric/Behavioral: Negative.         Physical Exam  Vitals:    01/20/22 1048   BP: (!) 150/82   Pulse: 82   Resp: 13   Temp: 97.6 °F (36.4 °C)   SpO2: 99%   Weight: 176 lb (79.8 kg)   Height: 5' 10\" (1.778 m)       Physical Exam  Constitutional:       Appearance: He is well-developed. HENT:      Right Ear: External ear normal.      Left Ear: External ear normal.   Eyes:      Conjunctiva/sclera: Conjunctivae normal.      Pupils: Pupils are equal, round, and reactive to light. Neck:      Thyroid: No thyromegaly. Cardiovascular:      Rate and Rhythm: Normal rate and regular rhythm. Heart sounds: Normal heart sounds. No murmur heard. No friction rub. No gallop. Pulmonary:      Effort: Pulmonary effort is normal. No respiratory distress. Breath sounds: Normal breath sounds. No wheezing. Abdominal:      General: Bowel sounds are normal. There is no distension. Palpations: Abdomen is soft. There is no mass. Tenderness: There is no abdominal tenderness. There is no guarding or rebound. Hernia: No hernia is present. Genitourinary:     Penis: Normal.    Musculoskeletal:         General: No tenderness. Normal range of motion. Cervical back: Normal range of motion and neck supple. Lymphadenopathy:      Cervical: No cervical adenopathy. Skin:     General: Skin is warm and dry. Neurological:      Mental Status: He is alert and oriented to person, place, and time. Cranial Nerves: No cranial nerve deficit. Coordination: Coordination normal.         Assessment   Diagnosis Orders   1. Type 2 diabetes mellitus without complication, without long-term current use of insulin (HCC)  SITagliptin (JANUVIA) 100 MG tablet    metFORMIN (GLUCOPHAGE) 500 MG tablet    simvastatin (ZOCOR) 10 MG tablet    POCT glycosylated hemoglobin (Hb A1C)   2.  Mixed hyperlipidemia  simvastatin (ZOCOR) 10 MG tablet     Problem List     Hyperlipidemia    Relevant Medications    aspirin (ASPIRIN LOW DOSE) 81 MG EC tablet    simvastatin (ZOCOR) 10 MG tablet    bisoprolol-hydroCHLOROthiazide (ZIAC) 5-6.25 MG per tablet    Type 2 diabetes mellitus without complication, without long-term current use of insulin (HCC)    Relevant Medications    SITagliptin (JANUVIA) 100 MG tablet    metFORMIN (GLUCOPHAGE) 500 MG tablet    simvastatin (ZOCOR) 10 MG tablet    Other Relevant Orders    POCT glycosylated hemoglobin (Hb A1C) (Completed)          Plan  Orders Placed This Encounter   Procedures    POCT glycosylated hemoglobin (Hb A1C)     Orders Placed This Encounter   Medications    SITagliptin (JANUVIA) 100 MG tablet     Sig: TAKE 1 TABLET BY MOUTH EVERY DAY     Dispense:  90 tablet     Refill:  3    metFORMIN (GLUCOPHAGE) 500 MG tablet     Sig: TAKE 2 TABLETS BY MOUTH TWICE A DAY WITH MEALS     Dispense:  360 tablet     Refill:  3    simvastatin (ZOCOR) 10 MG tablet     Sig: TAKE 1 TABLET BY MOUTH EVERY DAY     Dispense:  90 tablet     Refill:  3    bisoprolol-hydroCHLOROthiazide (ZIAC) 5-6.25 MG per tablet     Sig: Take 1 tablet by mouth daily     Dispense:  90 tablet     Refill:  3     No follow-ups on file.   Nohelia Callejas MD

## 2022-02-02 ENCOUNTER — TELEPHONE (OUTPATIENT)
Dept: PHARMACY | Facility: CLINIC | Age: 67
End: 2022-02-02

## 2022-02-02 ENCOUNTER — CARE COORDINATION (OUTPATIENT)
Dept: CARE COORDINATION | Age: 67
End: 2022-02-02

## 2022-02-02 NOTE — TELEPHONE ENCOUNTER
Received a referral:  from Care Coordinator to review patients medications. Called patient to schedule a time to speak with a pharmacist over the telephone. Spoke to patient and advised them of the above message. Patient verified understanding and scheduled their appointment: 2/4 at [address] [place] [address].    [address] [place] [address] free: [id number] in place:  No   Recommendation Provided To: Patient/Caregiver: 1 via Telephone   Intervention Detail: Scheduled Appointment   Gap Closed?: Yes    Intervention Accepted By: Patient/Caregiver: 1   Time Spent (min): 10

## 2022-02-02 NOTE — CARE COORDINATION
Called patient to discuss enrollment in [address] [place] [address]. I left a voice mail message introducing myself and a brief description of the Care Coordination Program.   Requested a call back to discuss the program.  Call back number provided. A CC Introduction Letter sent to patient per My Chart message.

## 2022-02-02 NOTE — LETTER
2/2/2022    [address] [place] [address]      Dear Liset Ramirez,    My name is Po Trujillo RN and I am a registered nurse who partners with Josey Zurita MD to improve patients' health. Josey Zurita MD believes you would benefit from working with me. As a member of your health care team, I would work with other providers involved in your care, offer education for your specific health conditions, and connect you with additional resources as needed. I will collaborate with Josey Zurita MD to support you in following your treatment plan. The additional support I provide is no additional cost to you. My primary focus is to help you achieve specific goals and improve your health. We are committed to walk with you on this journey and look forward to working with you. Please call me to further discuss your healthcare needs. I am available by phone. You can reach me at [phone].     In good health,     Po Trujillo RN Detail Level: Generalized Detail Level: Simple Detail Level: Zone Detail Level: Detailed

## 2022-02-02 NOTE — LETTER
2/2/2022    [address] [place], [place] [address] [facility]    Dear Jose Easton,    I enjoyed speaking with you and wanted to send some additional information. Ara Lawrence MD and I will work together to ensure your needs are met and help you achieve your health goals. We are committed to walk with you on this journey and look forward to working with you. Please feel free to contact me with any questions or concerns. I am available by phone. You can reach me at [phone].       In good health,     Jose Alejandro Cook RN

## 2022-02-02 NOTE — TELEPHONE ENCOUNTER
----- Message from Georgette Camp RN sent at 2/2/2022  2:38 PM EST -----  Regarding: ACC Clinical Rx  ACC Clinical Rx Referral

## 2022-02-02 NOTE — CARE COORDINATION
Ambulatory Care Coordination Note  2/2/2022  CM Risk Score: 2  Charlson 10 Year Mortality Risk Score: 23%     ACC: Mayte Schaffer RN    Summary Note: Patient contacted by phone for Care Coordination enrollment. Introduced myself and the Care Coordination program. Patient agrees to participate in program. Completed initial Care Coordination assessment. Reconciled home medications. Discussed Clinical Rx referral. Patient verbalizes agreement. Discussed referral to [address] [place] [address]. Patient agreed to referral. Patient states he prefers calls every other week. Reviewed ACP documents. Health Care Decision Maker current. Patient Advance Directive Living Will on file and is current. Instructed patient on availability of same day, next day, and Walk-In care. Discussed ACM follow-up call in approximately 1 week. Patient verbalizes agreement. Patient reports that he is not having any side effect with new blood pressure medication. Patient reports his blood pressure reading today was 136/74. Referral to Clinical Rx for ACC review made by In-Basket messages. A referral to outpatient dietician for Dx Diabetes and HTN sent by [facility] message. A CC Welcome Letter with Diabetes Zones, What Is High Blood Pressure, and Low Salt Diet handouts sent by My Chart message. Lab Results   Component Value Date    LABA1C 7.4 01/20/2022    LABA1C 7.6 (H) 05/12/2021    LABA1C 7.8 (H) 06/05/2020       Diabetes Assessment    Meal Planning: None   How often do you test your blood sugar?: Other (Comment: 2-3 times a week)   Do you have barriers with adherence to non-pharmacologic self-management interventions?  (Nutrition/Exercise/Self-Monitoring): No   Have you ever had to go to the ED for symptoms of low blood sugar?: No       No patient-reported symptoms   Do you have hyperglycemia symptoms?: No   Do you have hypoglycemia symptoms?: No   Blood Sugar Trends: No Change       and   General Assessment    Do you have any symptoms that are causing concern?: No           Ambulatory Care Coordination Assessment    Care Coordination Protocol  Program Enrollment: Rising Risk  Referral from Primary Care Provider: No  Week 1 - Initial Assessment     Do you have all of your prescriptions and are they filled?: Yes  Are you able to afford your medications?: Yes  How often do you have trouble taking your medications the way you have been told to take them?: I always take them as prescribed. Do you have Home O2 Therapy?: No      Ability to seek help/take action for Emergent Urgent situations i.e. fire, crime, inclement weather or health crisis. : Independent  Ability to ambulate to restroom: Independent  Ability handle personal hygeine needs (bathing/dressing/grooming): Independent  Ability to manage Medications: Independent  Ability to prepare Food Preparation: Independent  Ability to drive and/or has transportation: Needs Assistance  Ability to do shopping: Needs Assistance  Ability to manage finances: Independent     Current Housing: Apartment        Per the Fall Risk Screening, did the patient have 2 or more falls or 1 fall with injury in the past year?: No     Frequent urination at night?: No  Do you use rails/bars?: Yes  Do you have a non-slip tub mat?: Yes     Are you experiencing loss of meaning?: No  Are you experiencing loss of hope and peace?: No     Thinking about your patient's physical health needs, are there any symptoms or problems (risk indicators) you are unsure about that require further investigation?: No identified areas of uncertainly or problems already being investigated   Are the patients physical health problems impacting on their mental well-being?: No identified areas of concern   Are there any problems with your patients lifestyle behaviors (alcohol, drugs, diet, exercise) that are impacting on physical or mental well-being?: No identified areas of concern   Do you have any other concerns about your patients mental well-being? How would you rate their severity and impact on the patient?: No identified areas of concern   How would you rate their home environment in terms of safety and stability (including domestic violence, insecure housing, neighbor harassment)?: Consistently safe, supportive, stable, no identified problems   How do daily activities impact on the patient's well-being? (include current or anticipated unemployment, work, caregiving, access to transportation or other): No identified problems or perceived positive benefits   How would you rate their social network (family, work, friends)?: Adequate participation with social networks   How would you rate their financial resources (including ability to afford all required medical care)?: Financially secure, some resource challenges   How wells does the patient now understand their health and well-being (symptoms, signs or risk factors) and what they need to do to manage their health?: Reasonable to good understanding and already engages in managing health or is willing to undertake better management   How well do you think your patient can engage in healthcare discussions? (Barriers include language, deafness, aphasia, alcohol or drug problems, learning difficulties, concentration): Clear and open communication, no identified barriers   Do other services need to be involved to help this patient?: Other care/services not required at this time   Are current services involved with this patient well-coordinated? (Include coordination with other services you are now recommendation): All required care/services in place and well-coordinated   Suggested Interventions and [employer]   Other Services or Interventions: 2/2/2022 Instructed on same day, next day, and Walk-In Care. Pharmacist: In Process   Registered Dietician: In Process   Zone Management Tools:  In Process         Set up/Review Goals, Set up/Review an Education Plan              Prior to Admission medications Medication Sig Start Date End Date Taking? Authorizing Provider   SITagliptin (JANUVIA) 100 MG tablet TAKE 1 TABLET BY MOUTH EVERY DAY 1/20/22  Yes Дмитрий Gold MD   metFORMIN (GLUCOPHAGE) 500 MG tablet TAKE 2 TABLETS BY MOUTH TWICE A DAY WITH MEALS 1/20/22  Yes Дмитрий Gold MD   simvastatin (ZOCOR) 10 MG tablet TAKE 1 TABLET BY MOUTH EVERY DAY 1/20/22  Yes Дмитрий Gold MD   bisoprolol-hydroCHLOROthiazide [facility]) 5-6.25 MG per tablet Take 1 tablet by mouth daily 1/20/22  Yes Дмитрий Gold MD   blood glucose test strips [facility] BLOOD GLUCOSE TEST) strip Checks 1 time daily. NIDDM--ICD-10 E11.9 6/12/20  Yes Дмитрий Gold MD   aspirin (ASPIRIN LOW DOSE) 81 MG EC tablet take 1 tablet by mouth once daily 12/13/19  Yes TERESSA Trujillo - CNP   Lancets MISC Checks 1 time daily. NIDDM--ICD-10 E11.9 6/11/19  Yes Дмитрий Gold MD   Blood Glucose Monitoring Suppl [facility] BLOOD GLUCOSE METER) w/Device KIT 1 Device by Does not apply route 2 times daily DISP WITH TEST STRIP AND LANCETS CHECK BID # 200 3 RF 6/10/19  Yes Дмитрий Gold MD   ibuprofen (ADVIL;MOTRIN) 600 MG tablet Take 1 tablet by mouth every 8 hours as needed for Pain  Patient not taking: Reported on 1/20/2022 7/24/21   [place]ray Hill PA-C       Future Appointments   Date Time Provider Joel Romero   7/18/2022 11:45 AM Дмитрий Gold  [place], [place] [address]

## 2022-02-03 ENCOUNTER — CARE COORDINATION (OUTPATIENT)
Dept: CARE COORDINATION | Age: 67
End: 2022-02-03

## 2022-02-03 NOTE — CARE COORDINATION
Contacted Favian Huang and left voicemail regarding Dietitian referral. Left call back number and will follow up as appropriate.          [address] [facility], [address] [place] [address]

## 2022-02-04 ENCOUNTER — SCHEDULED TELEPHONE ENCOUNTER (OUTPATIENT)
Dept: PHARMACY | Facility: CLINIC | Age: 67
End: 2022-02-04

## 2022-02-04 NOTE — TELEPHONE ENCOUNTER
CLINICAL PHARMACY NOTE - Medication Review    Socrates Le is a 74 y.o. male referred to a clinical pharmacy specialist by care coordination. 2 attempts made to reach patient by telephone for scheduled medication review. Left voice message for patient to return clinician's phone call to [phone] opt 1. Thank you,  GIO Nunez, PharmD, [address] [place] [address]  Department, toll free: [phone]    For Pharmacy Admin Tracking Only     Gap Closed?: No    Time Spent (min): 15

## 2022-02-08 ENCOUNTER — SCHEDULED TELEPHONE ENCOUNTER (OUTPATIENT)
Dept: PHARMACY | Facility: CLINIC | Age: 67
End: 2022-02-08

## 2022-02-08 RX ORDER — CHOLECALCIFEROL (VITAMIN D3) 50 MCG
2000 TABLET ORAL DAILY
COMMUNITY

## 2022-02-08 RX ORDER — M-VIT,TX,IRON,MINS/CALC/FOLIC 27MG-0.4MG
1 TABLET ORAL DAILY
COMMUNITY

## 2022-02-08 NOTE — TELEPHONE ENCOUNTER
CLINICAL PHARMACY NOTE - Medication Review  Patient outreach to review medications - Spoke with patient. SUBJECTIVE/OBJECTIVE:   Liset Ramirez is a 74 y.o. male referred to a clinical pharmacy specialist by care coordination. Medications:  Medication Sig    SITagliptin (JANUVIA) 100 MG tablet TAKE 1 TABLET BY MOUTH EVERY DAY    metFORMIN (GLUCOPHAGE) 500 MG tablet TAKE 2 TABLETS BY MOUTH TWICE A DAY WITH MEALS    simvastatin (ZOCOR) 10 MG tablet TAKE 1 TABLET BY MOUTH EVERY DAY    bisoprolol-hydroCHLOROthiazide (ZIAC) 5-6.25 MG per tablet Take 1 tablet by mouth daily    ibuprofen (ADVIL;MOTRIN) 600 MG tablet Take 1 tablet by mouth every 8 hours as needed for Pain (Patient not taking: Reported on 1/20/2022)  - done    blood glucose test strips (ACURA BLOOD GLUCOSE TEST) strip Checks 1 time daily. NIDDM--ICD-10 E11.9    aspirin (ASPIRIN LOW DOSE) 81 MG EC tablet take 1 tablet by mouth once daily    Lancets MISC Checks 1 time daily. NIDDM--ICD-10 E11.9    Blood Glucose Monitoring Suppl (ACURA BLOOD GLUCOSE METER) w/Device KIT 1 Device by Does not apply route 2 times daily DISP WITH TEST STRIP AND LANCETS CHECK BID # 200 3 RF     Additional Medications (including OTC/Herbal Supplements):  · Multi 50+  · Ferrous sulfate 27  · Vit D  · Vit b 12    Allergies:    Allergies   Allergen Reactions    Lisinopril Diarrhea and Swelling     Swelling of lips, tongue and face and severe diarrhea       Pertinent Labs/Vitals:  BP Readings from Last 3 Encounters:   01/20/22 (!) 150/82   08/02/21 128/74   07/24/21 (!) 146/79     Lab Results   Component Value Date    LABMICR <1.20 05/12/2021     Lab Results   Component Value Date    LABA1C 7.4 01/20/2022    LABA1C 7.6 (H) 05/12/2021    LABA1C 7.8 (H) 06/05/2020     Lab Results   Component Value Date    CHOL 159 05/12/2021    TRIG 131 05/12/2021    HDL 54 05/12/2021    LDLCALC 79 05/12/2021     ALT   Date Value Ref Range Status   05/12/2021 29 0 - 41 U/L Final     AST Date Value Ref Range Status   05/12/2021 27 0 - 40 U/L Final     The 10-year ASCVD risk score (Liz Schroeder, et al., 2013) is: 35.1%    Values used to calculate the score:      Age: 74 years      Sex: Male      Is Non- : No      Diabetic: Yes      Tobacco smoker: No      Systolic Blood Pressure: 002 mmHg      Is BP treated: Yes      HDL Cholesterol: 54 mg/dL      Total Cholesterol: 159 mg/dL     Lab Results   Component Value Date    CREATININE 1.30 (H) 05/12/2021       CrCl cannot be calculated (Patient's most recent lab result is older than the maximum 120 days allowed. ). Social History:   Social History     Tobacco Use    Smoking status: Never Smoker    Smokeless tobacco: Never Used   Substance Use Topics    Alcohol use: No       Immunizations:   Most Recent Immunizations   Administered Date(s) Administered    COVID-19, Erling Nipper, Primary or Immunocompromised, PF, 100mcg/0.5mL 10/25/2021    Influenza Vaccine, unspecified formulation 10/15/2017    Influenza Virus Vaccine 10/15/2017    Influenza, High Dose (Fluzone 65 yrs and older) 09/09/2021    Influenza, High-dose, Quadv, 65 yrs +, IM (Fluzone) 09/09/2021    Influenza, Quadv, IM, PF (6 mo and older Fluzone, Flulaval, Fluarix, and 3 yrs and older Afluria) 08/15/2017    Influenza, Quadv, adjuvanted, 65 yrs +, IM, PF (Fluad) 09/02/2020    Influenza, Triv, inactivated, subunit, adjuvanted, IM (Fluad 65 yrs and older) 08/25/2018    Pneumococcal Conjugate 13-valent ([id number]) 01/23/2019    Pneumococcal Polysaccharide ([id number]) 11/17/2017    Tdap (Boostrix, Adacel) 07/24/2021    Unknown Immunization 09/02/2020     ASSESSMENT/PLAN:   - General Assessment:   · Adherence: denies  · Cost: denies, he has zero copays  · Current pharmacy/pharmacies: [facility]- is [facility]  · Drug interactions: No clinically significant interactions identified via [address] as category D or higher.   · Renal dosing: No renal adjustments necessary. · Immunizations: up to date except [id number]. And he does refuse shingles shot- he heard can get shingles from the shot- I discussed this is an inactivated virus and you can not get shingles from the vaccine. · Smoking status: never  · Blood pressure: Is not at target, but started new BP med bisoprolol/hctz, he checked his BP today and it was 122/80. Has swelling of face with lisinopril and had to stop lisinopril. Then was monitoring BP off med and it went back up. He checks BP twice a day and it has been in range now on new med. Denies SE to new BP med  · Lipids: Patient has a 10-yr ASCVD risk of >7.5% with DM and is therefore a candidate for high-intensity statin therapy based on updated guidelines. ldl 79 on simva 10 mg- pt does get some pains on this dose and would prefer to stay on this med and dose. - Diabetes:    Glycemic goal: directed by provider. Patient states no matter what he does he cant get below 7. He states his pcp says this is good and he is at goal   Current symptoms/problems: none   Home blood sugar records: fasting range: 120's, sometimes 130's   Any episodes of hypoglycemia: no   Current testing supplies/frequency: has supply and test as needed, once or twice a week   Reduce pill burden: janumet but not sure about insurance. Patient wants to leave as is. He does not pay any copays for meds right now   Therapy optimization: lifestyle modifications   Diet/exercise: rides bike 4-5 mile, does not sit around. Does cut out some sugar and carbs but eats what he wants to eat not what he has to eat.      - Upcoming appointments:   Future Appointments   Date Time Provider Joel Heather   2/8/2022 10:00 AM SCHEDULE, S [facility]ICAL [facility] None   7/18/2022 11:45 AM [place]  [facility], PharmD, [address] Samara Wooten Pharmacist  Department: [address] Closed?: Yes    Time Spent (min): 30

## 2022-02-10 ENCOUNTER — CARE COORDINATION (OUTPATIENT)
Dept: CARE COORDINATION | Age: 67
End: 2022-02-10

## 2022-02-10 NOTE — CARE COORDINATION
Contacted Favian Huang and left voicemail regarding Dietitian referral. Left call back number and will follow up as appropriate.      [address] [facility], [address] [place] [address]

## 2022-02-11 ENCOUNTER — CARE COORDINATION (OUTPATIENT)
Dept: CARE COORDINATION | Age: 67
End: 2022-02-11

## 2022-02-17 ENCOUNTER — CARE COORDINATION (OUTPATIENT)
Dept: CARE COORDINATION | Age: 67
End: 2022-02-17

## 2022-02-17 NOTE — CARE COORDINATION
Contacted Deena Becker and left voicemail regarding Dietitian referral. Left call back number. RD outreached 2/3, 2/10 and today 2/17- left VM all three outreaches. RD will notify Manda Cruz. RD will continue to follow/assist with patient return call.        [address] [facility], [address] [place] [address]

## 2022-02-18 ENCOUNTER — CARE COORDINATION (OUTPATIENT)
Dept: CARE COORDINATION | Age: 67
End: 2022-02-18

## 2022-02-18 NOTE — CARE COORDINATION
Ambulatory Care Coordination Note  2022  CM Risk Score: 2  Charlson 10 Year Mortality Risk Score: 23%     ACC: Felipe Butt RN    Summary Note: ACM follow-up call placed to patient. Patient reports blood sugar readings have been good. Patient reports last BP reading of 133/79. Patient denies any s/s of high or low blood sugar. Discussed diabetes management. Patient stated he wasn't able to speak with the dietician yesterday due to a family . He expects a call back from dietician either today or on Monday. Reviewed scheduled follow-up appointment with Margarita Bartlett MD. Discussed ACM follow-up call in approximately 1 week. Patient verbalizes agreement. Lab Results   Component Value Date    LABA1C 7.4 2022    LABA1C 7.6 (H) 2021    LABA1C 7.8 (H) 2020        Diabetes Assessment    Meal Planning: None   How often do you test your blood sugar?: Other (Comment: 2-3 times a week)   Do you have barriers with adherence to non-pharmacologic self-management interventions? (Nutrition/Exercise/Self-Monitoring): No   Have you ever had to go to the ED for symptoms of low blood sugar?: No       No patient-reported symptoms   Do you have hyperglycemia symptoms?: No   Do you have hypoglycemia symptoms?: No   Last Blood Sugar Value: 116   Blood Sugar Trends: No Change       and   General Assessment    Do you have any symptoms that are causing concern?: No           Care Coordination Interventions    Program Enrollment: Rising Risk  Referral from Primary Care Provider: No  Suggested Interventions and Community Resources  Pharmacist: In Process (Comment: 2022 Clinical Rx)  Registered Dietician: Completed (Comment: 2022 [facility] Dietician)  Zone Management Tools: Completed (Comment:  Diabetes Zones)  Other Services or Interventions: 2022 Instructed on same day, next day, and [address].          Goals Addressed                 This Visit's Progress     Conditions and Symptoms   On track     I will schedule office visits, as directed by my provider. I will keep my appointment or reschedule if I have to cancel. I will notify my provider of any barriers to my plan of care. I will follow my Zone Management tool to seek urgent or emergent care. I will notify my provider of any symptoms that indicate a worsening of my condition. Diabetes  HTN    Barriers: lack of education  Plan for overcoming my barriers: Care Coordination, Clinical Rx, ACC Dietician  Confidence: 10/10  Anticipated Goal Completion Date: 7/2/2022              Prior to Admission medications    Medication Sig Start Date End Date Taking? Authorizing Provider   Multiple Vitamins-Minerals (THERAPEUTIC MULTIVITAMIN-MINERALS) tablet Take 1 tablet by mouth daily    Historical Provider, MD   Cyanocobalamin (VITAMIN B 12 PO) Take 2,500 mg by mouth daily    Historical Provider, MD   vitamin D (CHOLECALCIFEROL) 50 MCG (2000 UT) TABS tablet Take 2,000 Units by mouth daily    Historical Provider, MD   ferrous sulfate 27 MG TABS Take 1 tablet by mouth daily    Historical Provider, MD   SITagliptin (JANUVIA) 100 MG tablet TAKE 1 TABLET BY MOUTH EVERY DAY 1/20/22   Shaunna Llamas MD   metFORMIN (GLUCOPHAGE) 500 MG tablet TAKE 2 TABLETS BY MOUTH TWICE A DAY WITH MEALS 1/20/22   Shaunna Llamas MD   simvastatin (ZOCOR) 10 MG tablet TAKE 1 TABLET BY MOUTH EVERY DAY 1/20/22   Shaunna Llamas MD   bisoprolol-hydroCHLOROthiazide [facility]) 5-6.25 MG per tablet Take 1 tablet by mouth daily 1/20/22   Shaunna Llamas MD   blood glucose test strips [facility] BLOOD GLUCOSE TEST) strip Checks 1 time daily. NIDDM--ICD-10 E11.9 6/12/20   Shaunna Llamas MD   aspirin (ASPIRIN LOW DOSE) 81 MG EC tablet take 1 tablet by mouth once daily 12/13/19   Ashlyn Guy, APRN - CNP   Lancets MISC Checks 1 time daily. NIDDM--ICD-10 E11.9 6/11/19   Shaunna Llamas MD   Blood Glucose Monitoring Suppl [facility] BLOOD GLUCOSE METER) w/Device KIT 1 Device by Does not apply route 2 times daily DISP WITH TEST STRIP AND LANCETS CHECK BID # 200 3 RF 6/10/19   Shaunna Llamas MD       Future Appointments   Date Time Provider Joel Romero   7/18/2022 11:45 AM Shaunna Llamas  [place], [place]

## 2022-02-28 ENCOUNTER — TELEPHONE (OUTPATIENT)
Dept: FAMILY MEDICINE CLINIC | Age: 67
End: 2022-02-28

## 2022-02-28 ENCOUNTER — CARE COORDINATION (OUTPATIENT)
Dept: CARE COORDINATION | Age: 67
End: 2022-02-28

## 2022-02-28 DIAGNOSIS — Z12.5 SCREENING FOR PROSTATE CANCER: ICD-10-CM

## 2022-02-28 DIAGNOSIS — I10 ESSENTIAL HYPERTENSION: ICD-10-CM

## 2022-02-28 DIAGNOSIS — E11.9 TYPE 2 DIABETES MELLITUS WITHOUT COMPLICATION, WITHOUT LONG-TERM CURRENT USE OF INSULIN (HCC): Primary | ICD-10-CM

## 2022-02-28 NOTE — CARE COORDINATION
Ambulatory Care Coordination Note  2/28/2022  CM Risk Score: 2  Charlson 10 Year Mortality Risk Score: 23%     ACC: Jacki Valles RN    Summary Note: ACM follow-p call placed to patient. Patient denies any current issues or concerns. Patient monitoring BP at home. Patient reports reading this morning prior to taking medications was 136/74. Patient had not taken blood sugar yet this morning. He stated he planned on taking 30 minutes-1 hour after eating. Discussed blood sugar monitoring . Discussed A1C goals. Encouraged patient to return call from [facility]. Reviewed upcoming appointment. Patient stated office was to call him to schedule lab appointment 2 weeks before scheduled appointment. Discussed sending message to office requesting a call to schedule. Discussed ACM follow-up call in 2 weeks. Patient verbalizes agreement. In-Basket message sent to [facility] clinical staff with request for lab appointment. A CC letter with patient handouts sent by [employer] message. Handouts enclosed: Know Your Numbers, Checking Your Blood Sugar, High Blood Sugar, and Low Blood Sugar     Lab Results   Component Value Date    LABA1C 7.4 01/20/2022    LABA1C 7.6 (H) 05/12/2021    LABA1C 7.8 (H) 06/05/2020        Diabetes Assessment    Meal Planning: None   How often do you test your blood sugar?: Other (Comment: 2-3 times a week)   Do you have barriers with adherence to non-pharmacologic self-management interventions?  (Nutrition/Exercise/Self-Monitoring): No   Have you ever had to go to the ED for symptoms of low blood sugar?: No       No patient-reported symptoms   Do you have hyperglycemia symptoms?: No   Do you have hypoglycemia symptoms?: No   Last Blood Sugar Value: 142   Blood Sugar Trends: No Change       and   General Assessment    Do you have any symptoms that are causing concern?: No         Care Coordination Interventions    Program Enrollment: Rising Risk  Referral from Primary Care Provider: No  Suggested Interventions and Community Resources  Pharmacist: In Process (Comment: 2/2/2022 Clinical Rx)  Registered Dietician: Completed (Comment: 2/2/2022 [facility] Dietician)  Zone Management Tools: Completed (Comment: /2/2022 Diabetes Zones)  Other Services or Interventions: 2/2/2022 Instructed on same day, next day, and Walk-In Care. Goals Addressed                 This Visit's Progress     Conditions and Symptoms   On track     I will schedule office visits, as directed by my provider. I will keep my appointment or reschedule if I have to cancel. I will notify my provider of any barriers to my plan of care. I will follow my Zone Management tool to seek urgent or emergent care. I will notify my provider of any symptoms that indicate a worsening of my condition. Diabetes  HTN    Barriers: lack of education  Plan for overcoming my barriers: Care Coordination, Clinical Rx, ACC Dietician  Confidence: 10/10  Anticipated Goal Completion Date: 7/2/2022              Prior to Admission medications    Medication Sig Start Date End Date Taking?  Authorizing Provider   Multiple Vitamins-Minerals (THERAPEUTIC MULTIVITAMIN-MINERALS) tablet Take 1 tablet by mouth daily    Historical Provider, MD   Cyanocobalamin (VITAMIN B 12 PO) Take 2,500 mg by mouth daily    Historical Provider, MD   vitamin D (CHOLECALCIFEROL) 50 MCG (2000 UT) TABS tablet Take 2,000 Units by mouth daily    Historical Provider, MD   ferrous sulfate 27 MG TABS Take 1 tablet by mouth daily    Historical Provider, MD   SITagliptin (JANUVIA) 100 MG tablet TAKE 1 TABLET BY MOUTH EVERY DAY 1/20/22   Keshia Benavides MD   metFORMIN (GLUCOPHAGE) 500 MG tablet TAKE 2 TABLETS BY MOUTH TWICE A DAY WITH MEALS 1/20/22   Keshia Benavides MD   simvastatin (ZOCOR) 10 MG tablet TAKE 1 TABLET BY MOUTH EVERY DAY 1/20/22   Keshia Benavides MD   bisoprolol-hydroCHLOROthiazide [facility]) 5-6.25 MG per tablet Take 1 tablet by mouth daily 1/20/22   Keshia Benavides MD   blood glucose test strips (ACURA BLOOD GLUCOSE TEST) strip Checks 1 time daily. NIDDM--ICD-10 E11.9 6/12/20   Wei Kelly MD   aspirin (ASPIRIN LOW DOSE) 81 MG EC tablet take 1 tablet by mouth once daily 12/13/19   TERESSA Boateng - CNP   Lancets MISC Checks 1 time daily. NIDDM--ICD-10 E11.9 6/11/19   Wei Kelly MD   Blood Glucose Monitoring Suppl [facility] BLOOD GLUCOSE METER) w/Device KIT 1 Device by Does not apply route 2 times daily DISP WITH TEST STRIP AND LANCETS CHECK BID # 200 3 RF 6/10/19   Wei Kelly MD       Future Appointments   Date Time Provider Joel Heather   7/18/2022 11:45 AM Wei Kelly  [place], [place]

## 2022-02-28 NOTE — LETTER
[address]      Dear Melina Flores,     I hope this letter finds you doing well! I am sending you patient education handouts that I felt would be useful to you. I hope you find the information helpful. Please look them over and let me know if you have any questions. You can contact me at  the number listed below. Sincerely,    Matt Bryan RN, RN, BSN, Nurse care Coordinator  [address] [place] [address]  Cell Phone: [phone]  Email: [email or web address]    Enclosed: Know Your Numbers, Checking Your Blood Sugar, High Blood Sugar, and Low Blood Sugar.

## 2022-02-28 NOTE — TELEPHONE ENCOUNTER
Patient is requesting to have labs done before his appointment on 07/18/2022. Please call to let the patient know the orders have been put in.

## 2022-03-15 ENCOUNTER — CARE COORDINATION (OUTPATIENT)
Dept: CARE COORDINATION | Age: 67
End: 2022-03-15

## 2022-03-15 NOTE — CARE COORDINATION
Ambulatory Care Coordination Note  3/15/2022  CM Risk Score: 2  Charlson 10 Year Mortality Risk Score: 23%     ACC: Felipe Butt RN    Summary Note: ACM follow-up call placed to patient. Patient denies any questions or concerns. Patient states he checked blood sugar yesterday. Reports BS reading of 136. Patient denies any s/s of hyperglycemia or hypoglycemia. Discussed blood glucose monitoring. Patient working with [address] [place] [address]. Patient monitoring BP. Patient reports yesterday's reading was 127/75. Reviewed upcoming appointments. Discussed ACM follow-up call in approximately 1 week. Patient verbalizes agreement. Lab Results   Component Value Date    LABA1C 7.4 01/20/2022    LABA1C 7.6 (H) 05/12/2021    LABA1C 7.8 (H) 06/05/2020        Diabetes Assessment    Meal Planning: None   How often do you test your blood sugar?: Other (Comment: 2-3 times a week)   Do you have barriers with adherence to non-pharmacologic self-management interventions? (Nutrition/Exercise/Self-Monitoring): No   Have you ever had to go to the ED for symptoms of low blood sugar?: No       No patient-reported symptoms   Do you have hyperglycemia symptoms?: No (Comment: Denies)   Do you have hypoglycemia symptoms?: No (Comment: Denies)   Last Blood Sugar Value: 136   Blood Sugar Trends: No Change       and   General Assessment    Do you have any symptoms that are causing concern?: No           Care Coordination Interventions    Program Enrollment: Rising Risk  Referral from Primary Care Provider: No  Suggested Interventions and Community Resources  Pharmacist: Completed (Comment: 2/2/2022 Clinical Rx)  Registered Dietician: Completed (Comment: 2/2/2022 [facility] Dietician)  Zone Management Tools: Completed (Comment: /2/2022 Diabetes Zones)  Other Services or Interventions: 2/2/2022 Instructed on same day, next day, and [address].          Goals Addressed                 This Visit's Progress     Conditions and Symptoms   On track     I will schedule office visits, as directed by my provider. I will keep my appointment or reschedule if I have to cancel. I will notify my provider of any barriers to my plan of care. I will follow my Zone Management tool to seek urgent or emergent care. I will notify my provider of any symptoms that indicate a worsening of my condition. Diabetes  HTN    Barriers: lack of education  Plan for overcoming my barriers: Care Coordination, Clinical Rx, ACC Dietician  Confidence: 10/10  Anticipated Goal Completion Date: 7/2/2022              Prior to Admission medications    Medication Sig Start Date End Date Taking? Authorizing Provider   Multiple Vitamins-Minerals (THERAPEUTIC MULTIVITAMIN-MINERALS) tablet Take 1 tablet by mouth daily    Historical Provider, MD   Cyanocobalamin (VITAMIN B 12 PO) Take 2,500 mg by mouth daily    Historical Provider, MD   vitamin D (CHOLECALCIFEROL) 50 MCG (2000 UT) TABS tablet Take 2,000 Units by mouth daily    Historical Provider, MD   ferrous sulfate 27 MG TABS Take 1 tablet by mouth daily    Historical Provider, MD   SITagliptin (JANUVIA) 100 MG tablet TAKE 1 TABLET BY MOUTH EVERY DAY 1/20/22   Wei Kelly MD   metFORMIN (GLUCOPHAGE) 500 MG tablet TAKE 2 TABLETS BY MOUTH TWICE A DAY WITH MEALS 1/20/22   Wei Kelly MD   simvastatin (ZOCOR) 10 MG tablet TAKE 1 TABLET BY MOUTH EVERY DAY 1/20/22   Wei Kelly MD   bisoprolol-hydroCHLOROthiazide [facility]) 5-6.25 MG per tablet Take 1 tablet by mouth daily 1/20/22   Wei Kelly MD   blood glucose test strips [facility] BLOOD GLUCOSE TEST) strip Checks 1 time daily. NIDDM--ICD-10 E11.9 6/12/20   Wei Kelly MD   aspirin (ASPIRIN LOW DOSE) 81 MG EC tablet take 1 tablet by mouth once daily 12/13/19   García Ray, APRN - CNP   Lancets MISC Checks 1 time daily. NIDDM--ICD-10 E11.9 6/11/19   Wei Kelly MD   Blood Glucose Monitoring Suppl [facility] BLOOD GLUCOSE METER) w/Device KIT 1 Device by Does not apply route 2 times daily DISP WITH TEST STRIP AND LANCETS CHECK BID # 200 3 RF 6/10/19   Jacek Spangler MD       Future Appointments   Date Time Provider Joel Leii   7/18/2022 11:45 AM Jacek Spangler  [place], [place]

## 2022-03-22 ENCOUNTER — CARE COORDINATION (OUTPATIENT)
Dept: CARE COORDINATION | Age: 67
End: 2022-03-22

## 2022-03-22 NOTE — CARE COORDINATION
Attempted to contact patient by phone for ACM follow-up. Message left regarding purpose of  call. Requested call back. Provided call back number.

## 2022-03-22 NOTE — CARE COORDINATION
Ambulatory Care Coordination Note  3/22/2022  CM Risk Score: 2  Charlson 10 Year Mortality Risk Score: 23%     ACC: Po Trujillo RN    Summary Note: Patient returned ACM call. Patient reports his BGM this morning was 137. Patient reports BP reading today was 125/78. Patient states that he is doing well on new BP medication. Denies any side effects. Patient working with [address] [place] [address]. Discussed Diabetes management. Provided diabetes education. Discussed ACM follow-up call in approximately 2 weeks. Patient verbalizes agreement. Lab Results   Component Value Date    LABA1C 7.4 01/20/2022    LABA1C 7.6 (H) 05/12/2021    LABA1C 7.8 (H) 06/05/2020        Diabetes Assessment    Meal Planning: None   How often do you test your blood sugar?: Other (Comment: 2-3 times a week)   Do you have barriers with adherence to non-pharmacologic self-management interventions? (Nutrition/Exercise/Self-Monitoring): No   Have you ever had to go to the ED for symptoms of low blood sugar?: No       No patient-reported symptoms   Do you have hyperglycemia symptoms?: No   Do you have hypoglycemia symptoms?: No   Last Blood Sugar Value: 137   Blood Sugar Trends: No Change       and   General Assessment    Do you have any symptoms that are causing concern?: No         Care Coordination Interventions    Program Enrollment: Rising Risk  Referral from Primary Care Provider: No  Suggested Interventions and Community Resources  Pharmacist: Completed (Comment: 2/2/2022 Clinical Rx)  Registered Dietician: Completed (Comment: 2/2/2022 ACC Dietician)  Zone Management Tools: Completed (Comment: /2/2022 Diabetes Zones)  Other Services or Interventions: 2/2/2022 Instructed on same day, next day, and [address]. Goals Addressed                 This Visit's Progress     Conditions and Symptoms   On track     I will schedule office visits, as directed by my provider. I will keep my appointment or reschedule if I have to cancel.   I will notify my provider of any barriers to my plan of care. I will follow my Zone Management tool to seek urgent or emergent care. I will notify my provider of any symptoms that indicate a worsening of my condition. Diabetes  HTN    Barriers: lack of education  Plan for overcoming my barriers: Care Coordination, Clinical Rx, ACC Dietician  Confidence: 10/10  Anticipated Goal Completion Date: 7/2/2022              Prior to Admission medications    Medication Sig Start Date End Date Taking? Authorizing Provider   Multiple Vitamins-Minerals (THERAPEUTIC MULTIVITAMIN-MINERALS) tablet Take 1 tablet by mouth daily    Historical Provider, MD   Cyanocobalamin (VITAMIN B 12 PO) Take 2,500 mg by mouth daily    Historical Provider, MD   vitamin D (CHOLECALCIFEROL) 50 MCG (2000 UT) TABS tablet Take 2,000 Units by mouth daily    Historical Provider, MD   ferrous sulfate 27 MG TABS Take 1 tablet by mouth daily    Historical Provider, MD   SITagliptin (JANUVIA) 100 MG tablet TAKE 1 TABLET BY MOUTH EVERY DAY 1/20/22   Shaunna Llamas MD   metFORMIN (GLUCOPHAGE) 500 MG tablet TAKE 2 TABLETS BY MOUTH TWICE A DAY WITH MEALS 1/20/22   Shaunna Llamas MD   simvastatin (ZOCOR) 10 MG tablet TAKE 1 TABLET BY MOUTH EVERY DAY 1/20/22   Shaunna Llamas MD   bisoprolol-hydroCHLOROthiazide [facility]) 5-6.25 MG per tablet Take 1 tablet by mouth daily 1/20/22   Shaunna Llamas MD   blood glucose test strips [facility] BLOOD GLUCOSE TEST) strip Checks 1 time daily. NIDDM--ICD-10 E11.9 6/12/20   Shaunna Llamas MD   aspirin (ASPIRIN LOW DOSE) 81 MG EC tablet take 1 tablet by mouth once daily 12/13/19   Ashlyn Guy, TERESSA - CNP   Lancets MISC Checks 1 time daily. NIDDM--ICD-10 E11.9 6/11/19   Shaunna Llamas MD   Blood Glucose Monitoring Suppl [facility] BLOOD GLUCOSE METER) w/Device KIT 1 Device by Does not apply route 2 times daily DISP WITH TEST STRIP AND LANCETS CHECK BID # 200 3 RF 6/10/19   Shaunna Llamas MD       Future Appointments   Date Time Provider Department [place]   7/18/2022 11:45 AM Lena Zavaleta  [place], [place]

## 2022-03-31 ENCOUNTER — CARE COORDINATION (OUTPATIENT)
Dept: CARE COORDINATION | Age: 67
End: 2022-03-31

## 2022-03-31 NOTE — LETTER
[address]      Dear Melina Flores,     I hope this letter finds you doing well! I am sending you patient education handouts that I felt would be useful to you. I hope you find the information helpful. Please look them over and let me know if you have any questions. You can contact me at  the number listed below. Sincerely,    Matt Bryan, RN, RN, BSN, Nurse care Coordinator  [address] [place] [address]  Cell Phone: [phone]    Enclosed: HBP [place] chart, BP Raisers, Foot Care For People With Diabetes, Diabetes And Your Eyes, Risk For Getting Cardiovascular Disease, and Managing Sick Days.

## 2022-03-31 NOTE — CARE COORDINATION
Ambulatory Care Coordination Note  3/31/2022  CM Risk Score: 2  Charlson 10 Year Mortality Risk Score: 23%     ACC: Jacek Lainez RN    Summary Note: ACM follow-up call placed to patient. Patient denies any current issues or concerns. Patient reports he has not checked his blood sugar or BP yet today. Patient reports BS yesterday of 139. Reviewed S/S of hyperglycemia and hypoglycemia. Patient denies. Patient reports his blood pressure was 125/69. Discussed next appointment with Gerard Rivas MD. Discussed availability of Walk-In Care. Discussed ACM follow-up in approximately 1 month (May 2 -May 6). Patient verbalizes agreement. A CC letter with educational handouts sent by [employer] and mailed to home address. Handouts provided: HBP [place] chart, BP Raisers, Foot Care For People With Diabetes, Diabetes And Your Eyes, Risk For Getting Cardiovascular Disease, and Managing Sick Days. Diabetes Assessment    Meal Planning: None   How often do you test your blood sugar?: Other (Comment: 2-3 times a week)   Do you have barriers with adherence to non-pharmacologic self-management interventions?  (Nutrition/Exercise/Self-Monitoring): No   Have you ever had to go to the ED for symptoms of low blood sugar?: No       No patient-reported symptoms   Do you have hyperglycemia symptoms?: No   Do you have hypoglycemia symptoms?: No   Last Blood Sugar Value: 139   Blood Sugar Trends: No Change       and   General Assessment    Do you have any symptoms that are causing concern?: No         Care Coordination Interventions    Program Enrollment: Rising Risk  Referral from Primary Care Provider: No  Suggested Interventions and Community Resources  Pharmacist: Completed (Comment: 2/2/2022 Clinical Rx)  Registered Dietician: Completed (Comment: 2/2/2022 ACC Dietician)  Zone Management Tools: Completed (Comment: /2/2022 Diabetes Zones)  Other Services or Interventions: 2/2/2022 Instructed on same day, next day, and Walk-In Care. Goals Addressed                 This Visit's Progress     Conditions and Symptoms   On track     I will schedule office visits, as directed by my provider. I will keep my appointment or reschedule if I have to cancel. I will notify my provider of any barriers to my plan of care. I will follow my Zone Management tool to seek urgent or emergent care. I will notify my provider of any symptoms that indicate a worsening of my condition. Diabetes  HTN    Barriers: lack of education  Plan for overcoming my barriers: Care Coordination, Clinical Rx, ACC Dietician  Confidence: 10/10  Anticipated Goal Completion Date: 7/2/2022              Prior to Admission medications    Medication Sig Start Date End Date Taking? Authorizing Provider   Multiple Vitamins-Minerals (THERAPEUTIC MULTIVITAMIN-MINERALS) tablet Take 1 tablet by mouth daily    Historical Provider, MD   Cyanocobalamin (VITAMIN B 12 PO) Take 2,500 mg by mouth daily    Historical Provider, MD   vitamin D (CHOLECALCIFEROL) 50 MCG (2000 UT) TABS tablet Take 2,000 Units by mouth daily    Historical Provider, MD   ferrous sulfate 27 MG TABS Take 1 tablet by mouth daily    Historical Provider, MD   SITagliptin (JANUVIA) 100 MG tablet TAKE 1 TABLET BY MOUTH EVERY DAY 1/20/22   Eric Haines MD   metFORMIN (GLUCOPHAGE) 500 MG tablet TAKE 2 TABLETS BY MOUTH TWICE A DAY WITH MEALS 1/20/22   Eric Haines MD   simvastatin (ZOCOR) 10 MG tablet TAKE 1 TABLET BY MOUTH EVERY DAY 1/20/22   Eric Haines MD   bisoprolol-hydroCHLOROthiazide [facility]) 5-6.25 MG per tablet Take 1 tablet by mouth daily 1/20/22   Eric Haines MD   blood glucose test strips [facility] BLOOD GLUCOSE TEST) strip Checks 1 time daily. NIDDM--ICD-10 E11.9 6/12/20   Eric Haines MD   aspirin (ASPIRIN LOW DOSE) 81 MG EC tablet take 1 tablet by mouth once daily 12/13/19   TERESSA Perry - CNP   Lancets MISC Checks 1 time daily. NIDDM--ICD-10 E11.9 6/11/19   Eric Haines MD Blood Glucose Monitoring Suppl ([facility] BLOOD GLUCOSE METER) w/Device KIT 1 Device by Does not apply route 2 times daily DISP WITH TEST STRIP AND LANCETS CHECK BID # 200 3 RF 6/10/19   Girish Sanchez MD       Future Appointments   Date Time Provider Joel Leii   7/18/2022 11:45 AM Girish Sanchez  [place], [place]

## 2022-05-04 ENCOUNTER — CARE COORDINATION (OUTPATIENT)
Dept: CARE COORDINATION | Age: 67
End: 2022-05-04

## 2022-05-04 NOTE — CARE COORDINATION
Ambulatory Care Coordination Note  5/4/2022  CM Risk Score: 2  Charlson 10 Year Mortality Risk Score: 23%     ACC: Roel Ch RN    Summary Note: ACM follow-up call placed to patient. Patient denies any s/s of hyperglycemia or hypoglycemia. Patient acknowledges receipt of educational handouts provided by mail. Patient denies any questions regarding information. Patient reports BP readings have been good. Patient reports last BP reading of 119/74. Patient states he has a question for Ashwini Quintana MD regarding daily low dose aspirin he is currently taking. Patient currently on aspirin  81 mg daily. Patient referenced a news report last week regarding an aspirin study. Patient states that he takes a statin daily and that his cholesterol is WNL. Discussed sending a message to Ashwini Quintana MD to advise. Informed patient that ACM will contact him with Ashwini Quintana MD direction. Discussed Diabetes management and diet. Discussed ACM follow-up in approximately 1 month. Patient verbalizes agreement. An In-Basket message sent to Ashwini Quintana MD with patient's question regarding aspirin. Diabetes Assessment    Meal Planning: None   How often do you test your blood sugar?: Other (Comment: 2-3 times a week)   Do you have barriers with adherence to non-pharmacologic self-management interventions?  (Nutrition/Exercise/Self-Monitoring): No   Have you ever had to go to the ED for symptoms of low blood sugar?: No       No patient-reported symptoms   Do you have hyperglycemia symptoms?: No   Do you have hypoglycemia symptoms?: No   Blood Sugar Monitoring Regimen: Once a Day   Blood Sugar Trends: No Change (Comment: Reports blood sugar readings have been between 130-140.)       and   General Assessment    Do you have any symptoms that are causing concern?: No         Care Coordination Interventions    Program Enrollment: Rising Risk  Referral from Primary Care Provider: No  Suggested Interventions and Community Resources  Pharmacist: Completed (Comment: 2/2/2022 Clinical Rx)  Registered Dietician: Completed (Comment: 2/2/2022 ACC Dietician)  Zone Management Tools: Completed (Comment: /2/2022 Diabetes Zones)  Other Services or Interventions: 2/2/2022 Instructed on same day, next day, and Walk-In Care. Goals Addressed                 This Visit's Progress     Conditions and Symptoms   On track     I will schedule office visits, as directed by my provider. I will keep my appointment or reschedule if I have to cancel. I will notify my provider of any barriers to my plan of care. I will follow my Zone Management tool to seek urgent or emergent care. I will notify my provider of any symptoms that indicate a worsening of my condition. Diabetes  HTN    Barriers: lack of education  Plan for overcoming my barriers: Care Coordination, Clinical Rx, ACC Dietician  Confidence: 10/10  Anticipated Goal Completion Date: 7/2/2022              Prior to Admission medications    Medication Sig Start Date End Date Taking?  Authorizing Provider   Multiple Vitamins-Minerals (THERAPEUTIC MULTIVITAMIN-MINERALS) tablet Take 1 tablet by mouth daily    Historical Provider, MD   Cyanocobalamin (VITAMIN B 12 PO) Take 2,500 mg by mouth daily    Historical Provider, MD   vitamin D (CHOLECALCIFEROL) 50 MCG (2000 UT) TABS tablet Take 2,000 Units by mouth daily    Historical Provider, MD   ferrous sulfate 27 MG TABS Take 1 tablet by mouth daily    Historical Provider, MD   SITagliptin (JANUVIA) 100 MG tablet TAKE 1 TABLET BY MOUTH EVERY DAY 1/20/22   Maycol August MD   metFORMIN (GLUCOPHAGE) 500 MG tablet TAKE 2 TABLETS BY MOUTH TWICE A DAY WITH MEALS 1/20/22   Maycol August MD   simvastatin (ZOCOR) 10 MG tablet TAKE 1 TABLET BY MOUTH EVERY DAY 1/20/22   Maycol August MD   bisoprolol-hydroCHLOROthiazide [facility]) 5-6.25 MG per tablet Take 1 tablet by mouth daily 1/20/22   Maycol August MD   blood glucose test strips [facility] BLOOD GLUCOSE TEST) strip Checks 1 time daily. NIDDM--ICD-10 E11.9 6/12/20   Deirdre Camarillo MD   aspirin (ASPIRIN LOW DOSE) 81 MG EC tablet take 1 tablet by mouth once daily 12/13/19   TERESSA Pichardo - CNP   Lancets MISC Checks 1 time daily. NIDDM--ICD-10 E11.9 6/11/19   Deirdre Camarillo MD   Blood Glucose Monitoring Suppl [facility] BLOOD GLUCOSE METER) w/Device KIT 1 Device by Does not apply route 2 times daily DISP WITH TEST STRIP AND LANCETS CHECK BID # 200 3 RF 6/10/19   Deirdre Camarillo MD       Future Appointments   Date Time Provider Joel Heather   7/18/2022 11:45 AM Deirdre Camarillo  [place], [place]

## 2022-05-10 ENCOUNTER — TELEPHONE (OUTPATIENT)
Dept: FAMILY MEDICINE CLINIC | Age: 67
End: 2022-05-10

## 2022-05-10 NOTE — TELEPHONE ENCOUNTER
----- Message from Marvin Carey RN sent at 5/4/2022 12:11 PM EDT -----  Regarding: Patient Question Re: Low Dose Aspirin  Patient is asking if he should continue to take his low dose aspirin (81 mg daily). He referenced a recent study regarding aspirin. Please advise. Patient taking simvastatin/ Zocor 10 mg daily.   5/2021 lipids - WNL

## 2022-05-11 ENCOUNTER — TELEPHONE (OUTPATIENT)
Dept: FAMILY MEDICINE CLINIC | Age: 67
End: 2022-05-11

## 2022-05-11 NOTE — TELEPHONE ENCOUNTER
I spoke to pt. Detail Level: Zone Depth In Mm (Will Not Render If 0): 0 Consent: Written consent obtained, risks reviewed including but not limited to pain, scarring, infection and incomplete improvement.  Patient understands the procedure is cosmetic in nature and will require out of pocket payment. Price (Use Numbers Only, No Special Characters Or $): 400 Post-Care Instructions: After the procedure, take precautions agains sun exposure. Do not apply sunscreen for 12 hours after the procedure. Do not apply make-up for 12 hours after the procedure. Avoid alcohol based toners for 10-14 days. After 2-3 days patients can return to their regular skin regimen. Amount Injected At This Location In Cc (Will Not Render If 0): 6 Technique 2: Prp injected with a 27 gauge cannula Show Additional Techniques: Yes Which Technique?: 2 Location #1: tear troughs

## 2022-05-11 NOTE — TELEPHONE ENCOUNTER
----- Message from Kayce Fuentes sent at 5/10/2022 11:58 AM EDT -----  Subject: Message to Provider    QUESTIONS  Information for Provider? This message is for Dr. Siena Bland nurse to have   her call Geo Tamayo back please. He was told by Eduardo that Dr. Amanda Vega did not   renew as his pcp and he was to go to a [facility] and he has an appt   with Dr. Amanda Vega in July and wants to know what he is to do about this. Please call him back as he is under the impression he is not to be seen   there any more.  ---------------------------------------------------------------------------  --------------  CALL BACK INFO  What is the best way for the office to contact you? OK to leave message on   voicemail  Preferred Call Back Phone Number? [phone]  ---------------------------------------------------------------------------  --------------  SCRIPT ANSWERS  Relationship to Patient?  Self

## 2022-05-31 ENCOUNTER — CARE COORDINATION (OUTPATIENT)
Dept: CARE COORDINATION | Age: 67
End: 2022-05-31

## 2022-05-31 NOTE — CARE COORDINATION
Ambulatory Care Coordination Note  5/31/2022  CM Risk Score: 2  Charlson 10 Year Mortality Risk Score: 23%     ACC: Pennie Rudolph RN    Summary Note: Patient returned ACM call. Patient reports that he received a letter from his insurance company and that DR castle is no longer in his plan. He states that the letter indicated that another provider was selected for him. The name and address for the provider selected was provided to patient. Patient states he called the number and the provider is a phychiatric provider. Patient provided the address which is the address of [address] [place] [address].  Discussed contacting insurance for clarification. Directed patient to determine through insurance if [facility] is covered under his policy. Explained that Dr castle is employed by [employer] and may still be covered. Patient denies any current concerns other than insurance issue. Reviewed upcoming appointment. Discussed ACM follow-up call in approximately 1 month. Lab Results   Component Value Date    LABA1C 7.4 01/20/2022    LABA1C 7.6 (H) 05/12/2021    LABA1C 7.8 (H) 06/05/2020       Diabetes Assessment    Meal Planning: None   How often do you test your blood sugar?: Other (Comment: 2-3 times a week)   Do you have barriers with adherence to non-pharmacologic self-management interventions?  (Nutrition/Exercise/Self-Monitoring): No   Have you ever had to go to the ED for symptoms of low blood sugar?: No       No patient-reported symptoms   Do you have hyperglycemia symptoms?: No   Do you have hypoglycemia symptoms?: No   Blood Sugar Trends: No Change       and   General Assessment    Do you have any symptoms that are causing concern?: No           Lab Results     None          Care Coordination Interventions    Program Enrollment: Rising Risk  Referral from Primary Care Provider: No  Suggested Interventions and Community Resources  Pharmacist: Completed (Comment: 2/2/2022 6/12/20   Leatha Otero MD   Lancets MISC Checks 1 time daily. NIDDM--ICD-10 E11.9 6/11/19   Leatha Otero MD   Blood Glucose Monitoring Suppl [facility] BLOOD GLUCOSE METER) w/Device KIT 1 Device by Does not apply route 2 times daily DISP WITH TEST STRIP AND LANCETS CHECK BID # 200 3 RF 6/10/19   Leatha Otero MD       Future Appointments   Date Time Provider Joel Leii   7/18/2022 11:45 AM Leatha Otero  [place], [place]

## 2022-06-01 ENCOUNTER — TELEPHONE (OUTPATIENT)
Dept: FAMILY MEDICINE CLINIC | Age: 67
End: 2022-06-01

## 2022-06-01 NOTE — TELEPHONE ENCOUNTER
Returned the call to the patient explaining that this has been an issue that is being worked on and that  [facility] is indeed accepted they [place] - Patient stated that he was told that if he continued to go to the current provider that he would not be covered. Patient would like a call in regards to this.

## 2022-06-01 NOTE — TELEPHONE ENCOUNTER
----- Message from Stefanie Paiz MA sent at 5/31/2022  3:27 PM EDT -----  Subject: Message to Provider    QUESTIONS  Information for Provider? Has appt 7/18/22. Eduardo called patient and told   him Dr. Baylee Levy is no longer with his Insurance and that he has to cancel   appt. Insurance was approved through the RTE. Please call patient.   ---------------------------------------------------------------------------  --------------  CALL BACK INFO  What is the best way for the office to contact you? OK to leave message on   voicemail  Preferred Call Back Phone Number? [phone]  ---------------------------------------------------------------------------  --------------  SCRIPT ANSWERS  Relationship to Patient?  Self

## 2022-06-30 ENCOUNTER — OFFICE VISIT (OUTPATIENT)
Dept: FAMILY MEDICINE CLINIC | Age: 67
End: 2022-06-30
Payer: MEDICARE

## 2022-06-30 ENCOUNTER — CARE COORDINATION (OUTPATIENT)
Dept: CARE COORDINATION | Age: 67
End: 2022-06-30

## 2022-06-30 VITALS
BODY MASS INDEX: 25.77 KG/M2 | HEART RATE: 68 BPM | WEIGHT: 180 LBS | RESPIRATION RATE: 14 BRPM | HEIGHT: 70 IN | OXYGEN SATURATION: 98 % | DIASTOLIC BLOOD PRESSURE: 70 MMHG | SYSTOLIC BLOOD PRESSURE: 122 MMHG

## 2022-06-30 DIAGNOSIS — I10 ESSENTIAL HYPERTENSION: ICD-10-CM

## 2022-06-30 DIAGNOSIS — E11.9 TYPE 2 DIABETES MELLITUS WITHOUT COMPLICATION, WITHOUT LONG-TERM CURRENT USE OF INSULIN (HCC): Primary | ICD-10-CM

## 2022-06-30 DIAGNOSIS — E78.2 MIXED HYPERLIPIDEMIA: ICD-10-CM

## 2022-06-30 DIAGNOSIS — E11.9 TYPE 2 DIABETES MELLITUS WITHOUT COMPLICATION, WITHOUT LONG-TERM CURRENT USE OF INSULIN (HCC): ICD-10-CM

## 2022-06-30 DIAGNOSIS — Z12.5 ENCOUNTER FOR SCREENING FOR MALIGNANT NEOPLASM OF PROSTATE: ICD-10-CM

## 2022-06-30 LAB
CREATININE URINE: 142.3 MG/DL
HBA1C MFR BLD: 8.7 %
MICROALBUMIN UR-MCNC: 2.7 MG/DL
MICROALBUMIN/CREAT UR-RTO: 19 MG/G (ref 0–30)

## 2022-06-30 PROCEDURE — 99214 OFFICE O/P EST MOD 30 MIN: CPT | Performed by: INTERNAL MEDICINE

## 2022-06-30 PROCEDURE — 1123F ACP DISCUSS/DSCN MKR DOCD: CPT | Performed by: INTERNAL MEDICINE

## 2022-06-30 PROCEDURE — 83036 HEMOGLOBIN GLYCOSYLATED A1C: CPT | Performed by: INTERNAL MEDICINE

## 2022-06-30 PROCEDURE — 3052F HG A1C>EQUAL 8.0%<EQUAL 9.0%: CPT | Performed by: INTERNAL MEDICINE

## 2022-06-30 RX ORDER — BISOPROLOL FUMARATE AND HYDROCHLOROTHIAZIDE 5; 6.25 MG/1; MG/1
1 TABLET ORAL DAILY
Qty: 90 TABLET | Refills: 3 | Status: SHIPPED | OUTPATIENT
Start: 2022-06-30

## 2022-06-30 RX ORDER — SIMVASTATIN 10 MG
TABLET ORAL
Qty: 90 TABLET | Refills: 3 | Status: SHIPPED | OUTPATIENT
Start: 2022-06-30

## 2022-06-30 SDOH — ECONOMIC STABILITY: FOOD INSECURITY: WITHIN THE PAST 12 MONTHS, YOU WORRIED THAT YOUR FOOD WOULD RUN OUT BEFORE YOU GOT MONEY TO BUY MORE.: NEVER TRUE

## 2022-06-30 SDOH — ECONOMIC STABILITY: FOOD INSECURITY: WITHIN THE PAST 12 MONTHS, THE FOOD YOU BOUGHT JUST DIDN'T LAST AND YOU DIDN'T HAVE MONEY TO GET MORE.: NEVER TRUE

## 2022-06-30 ASSESSMENT — ENCOUNTER SYMPTOMS
WHEEZING: 0
TROUBLE SWALLOWING: 0
EYE PAIN: 0
RECTAL PAIN: 0
EYE REDNESS: 0
ABDOMINAL DISTENTION: 0
VOMITING: 0
CONSTIPATION: 0
APNEA: 0
DIARRHEA: 0
SORE THROAT: 0
FACIAL SWELLING: 0
BACK PAIN: 0
BLOOD IN STOOL: 0
COLOR CHANGE: 0
RHINORRHEA: 0
VOICE CHANGE: 0
SINUS PAIN: 0
PHOTOPHOBIA: 0
COUGH: 0
SINUS PRESSURE: 0
CHEST TIGHTNESS: 0
EYE DISCHARGE: 0
ABDOMINAL PAIN: 0
EYE ITCHING: 0
SHORTNESS OF BREATH: 0
NAUSEA: 0

## 2022-06-30 ASSESSMENT — SOCIAL DETERMINANTS OF HEALTH (SDOH): HOW HARD IS IT FOR YOU TO PAY FOR THE VERY BASICS LIKE FOOD, HOUSING, MEDICAL CARE, AND HEATING?: NOT HARD AT ALL

## 2022-06-30 NOTE — CARE COORDINATION
Left message on voicemail for patient to return call regarding care coordination and healthcare needs to this ACM or Ayaz Baumann.

## 2022-06-30 NOTE — PROGRESS NOTES
Subjective:      Patient ID: Loy Zaragoza is a 69 y.o. male Established patient, here for evaluation of the following chief complaint(s):  Chief Complaint   Patient presents with    New Patient       HPI  58-year-old type II diabetic hyperlipidemic hypertensive male presents to establish continuity with me as his primary care doctor. Type 2 diabetes mellitus without complication, without long-term current use of insulin (HCC)-compliant with metformin 500 mg orally daily and Januvia 100 mg orally daily. His present hemoglobin A1c is 8.7. He acknowledges dietary indiscretion in regards to a diabetic diet. Mixed hyperlipidemia-compliant with Zocor 10 mg daily        Essential hypertension daily-compliant with Ziac 5-6 0.25 mg daily        At present he denies polyuria,  Polydipsia, constitutional, sinus, visual, cardiopulmonary, urologic, gastrointestinal, immunologic/hematologic, musculoskeletal, neurologic,dermatologic, or psychiatric complaints. Current Outpatient Medications on File Prior to Visit   Medication Sig Dispense Refill    Multiple Vitamins-Minerals (THERAPEUTIC MULTIVITAMIN-MINERALS) tablet Take 1 tablet by mouth daily      Cyanocobalamin (VITAMIN B 12 PO) Take 2,500 mg by mouth daily      vitamin D (CHOLECALCIFEROL) 50 MCG (2000 UT) TABS tablet Take 2,000 Units by mouth daily      ferrous sulfate 27 MG TABS Take 1 tablet by mouth daily      blood glucose test strips (ACURA BLOOD GLUCOSE TEST) strip Checks 1 time daily. NIDDM--ICD-10 E11.9 100 each 3    Lancets MISC Checks 1 time daily. NIDDM--ICD-10 E11.9 100 each 3    Blood Glucose Monitoring Suppl (ACURA BLOOD GLUCOSE METER) w/Device KIT 1 Device by Does not apply route 2 times daily DISP WITH TEST STRIP AND LANCETS CHECK BID # 200 3 RF 1 kit 0     No current facility-administered medications on file prior to visit.       Lisinopril    Review of Systems   Constitutional: Negative for chills, diaphoresis, fatigue and fever.   HENT: Negative for congestion, dental problem, drooling, ear discharge, ear pain, facial swelling, hearing loss, mouth sores, nosebleeds, postnasal drip, rhinorrhea, sinus pressure, sinus pain, sneezing, sore throat, tinnitus, trouble swallowing and voice change. Eyes: Negative for photophobia, pain, discharge, redness, itching and visual disturbance. Respiratory: Negative for apnea, cough, chest tightness, shortness of breath and wheezing. Cardiovascular: Negative for chest pain, palpitations and leg swelling. Gastrointestinal: Negative for abdominal distention, abdominal pain, blood in stool, constipation, diarrhea, nausea, rectal pain and vomiting. Endocrine: Negative for cold intolerance, heat intolerance, polydipsia, polyphagia and polyuria. Genitourinary: Negative for decreased urine volume, difficulty urinating, dysuria, flank pain, frequency, genital sores, hematuria and urgency. Musculoskeletal: Negative for arthralgias, back pain, gait problem, joint swelling, myalgias, neck pain and neck stiffness. Skin: Negative for color change, rash and wound. Allergic/Immunologic: Negative for environmental allergies and food allergies. Neurological: Negative for dizziness, tremors, seizures, syncope, facial asymmetry, speech difficulty, weakness, light-headedness, numbness and headaches. Hematological: Negative for adenopathy. Does not bruise/bleed easily. Psychiatric/Behavioral: Negative for agitation, confusion, decreased concentration, hallucinations, self-injury, sleep disturbance and suicidal ideas. The patient is not nervous/anxious. Objective:   /70   Pulse 68   Resp 14   Ht 5' 10\" (1.778 m)   Wt 180 lb (81.6 kg)   SpO2 98%   BMI 25.83 kg/m²     Physical Exam  Constitutional:       General: He is not in acute distress. Appearance: He is well-developed. HENT:      Head: Normocephalic.       Right Ear: External ear normal.      Left Ear: External ear normal.   Eyes:      Conjunctiva/sclera: Conjunctivae normal.   Neck:      Vascular: No JVD. Trachea: No tracheal deviation. Cardiovascular:      Rate and Rhythm: Normal rate and regular rhythm. Heart sounds: Normal heart sounds. Pulmonary:      Effort: Pulmonary effort is normal. No respiratory distress. Breath sounds: Normal breath sounds. No wheezing or rales. Chest:      Chest wall: No tenderness. Abdominal:      General: Bowel sounds are normal. There is no distension. Palpations: Abdomen is soft. There is no mass. Tenderness: There is no abdominal tenderness. There is no guarding or rebound. Musculoskeletal:         General: No tenderness or deformity. Cervical back: Neck supple. Skin:     General: Skin is warm and dry. Coloration: Skin is not pale. Findings: No erythema or rash. Neurological:      Mental Status: He is alert and oriented to person, place, and time. Cranial Nerves: No cranial nerve deficit. Motor: No abnormal muscle tone. Psychiatric:         Thought Content: Thought content normal.         Judgment: Judgment normal.         Assessment:       Diagnosis Orders   1. Type 2 diabetes mellitus without complication, without long-term current use of insulin ([facility])  POCT glycosylated hemoglobin (Hb A1C)    Microalbumin / Creatinine Urine Ratio    metFORMIN (GLUCOPHAGE) 500 MG tablet    simvastatin (ZOCOR) 10 MG tablet    SITagliptin (JANUVIA) 100 MG tablet    Comprehensive Metabolic Panel    CBC with Auto Differential    Lipid Panel    PSA Screening   2. Mixed hyperlipidemia  simvastatin (ZOCOR) 10 MG tablet   3. Essential hypertension     4. Encounter for screening for malignant neoplasm of prostate   PSA Screening        No results found for: LIPIDPAN, BMP, CMP, CBC, CBCAUTODIF  Plan:      Jose Vick was seen today for new patient.     Diagnoses and all orders for this visit:    Type 2 diabetes mellitus without complication, without long-term current use of insulin (HCC)  -     POCT glycosylated hemoglobin (Hb A1C)  -     Microalbumin / Creatinine Urine Ratio; Future  -     metFORMIN (GLUCOPHAGE) 500 MG tablet; TAKE 2 TABLETS BY MOUTH TWICE A DAY WITH MEALS  -     simvastatin (ZOCOR) 10 MG tablet; TAKE 1 TABLET BY MOUTH EVERY DAY  -     SITagliptin (JANUVIA) 100 MG tablet; TAKE 1 TABLET BY MOUTH EVERY DAY  -     Comprehensive Metabolic Panel; Future  -     CBC with Auto Differential; Future  -     Lipid Panel; Future  -     PSA Screening; Future    Mixed hyperlipidemia  -     simvastatin (ZOCOR) 10 MG tablet; TAKE 1 TABLET BY MOUTH EVERY DAY    Essential hypertension    Encounter for screening for malignant neoplasm of prostate   -     PSA Screening; Future    Other orders  -     bisoprolol-hydroCHLOROthiazide (ZIAC) 5-6.25 MG per tablet; Take 1 tablet by mouth daily         Return in about 6 months (around 12/30/2022). On this date 06/30/22 I have spent 30 minutes reviewing previous notes, test results and face to face with the patient discussing the diagnosis and importance of compliance with the treatment plan. Ishan Marlow MD    Please note, this report has been partially produced using speech recognition software  and may cause  and /or contain errors related to that system including grammar, punctuation and spelling as well as words and phrases that may seem inappropriate. If there are questions or concerns please feel free to contact me to clarify.

## 2022-07-05 ENCOUNTER — TELEMEDICINE (OUTPATIENT)
Dept: FAMILY MEDICINE CLINIC | Age: 67
End: 2022-07-05
Payer: MEDICARE

## 2022-07-05 DIAGNOSIS — Z00.00 MEDICARE ANNUAL WELLNESS VISIT, SUBSEQUENT: Primary | ICD-10-CM

## 2022-07-05 PROCEDURE — G0439 PPPS, SUBSEQ VISIT: HCPCS | Performed by: INTERNAL MEDICINE

## 2022-07-05 PROCEDURE — 1123F ACP DISCUSS/DSCN MKR DOCD: CPT | Performed by: INTERNAL MEDICINE

## 2022-07-05 ASSESSMENT — PATIENT HEALTH QUESTIONNAIRE - PHQ9
10. IF YOU CHECKED OFF ANY PROBLEMS, HOW DIFFICULT HAVE THESE PROBLEMS MADE IT FOR YOU TO DO YOUR WORK, TAKE CARE OF THINGS AT HOME, OR GET ALONG WITH OTHER PEOPLE: 0
2. FEELING DOWN, DEPRESSED OR HOPELESS: 0
SUM OF ALL RESPONSES TO PHQ9 QUESTIONS 1 & 2: 0
SUM OF ALL RESPONSES TO PHQ QUESTIONS 1-9: 0
SUM OF ALL RESPONSES TO PHQ QUESTIONS 1-9: 0
3. TROUBLE FALLING OR STAYING ASLEEP: 0
1. LITTLE INTEREST OR PLEASURE IN DOING THINGS: 0
SUM OF ALL RESPONSES TO PHQ QUESTIONS 1-9: 0
SUM OF ALL RESPONSES TO PHQ QUESTIONS 1-9: 0
8. MOVING OR SPEAKING SO SLOWLY THAT OTHER PEOPLE COULD HAVE NOTICED. OR THE OPPOSITE, BEING SO FIGETY OR RESTLESS THAT YOU HAVE BEEN MOVING AROUND A LOT MORE THAN USUAL: 0
1. LITTLE INTEREST OR PLEASURE IN DOING THINGS: 0
9. THOUGHTS THAT YOU WOULD BE BETTER OFF DEAD, OR OF HURTING YOURSELF: 0
2. FEELING DOWN, DEPRESSED OR HOPELESS: 0
6. FEELING BAD ABOUT YOURSELF - OR THAT YOU ARE A FAILURE OR HAVE LET YOURSELF OR YOUR FAMILY DOWN: 0
SUM OF ALL RESPONSES TO PHQ QUESTIONS 1-9: 0
7. TROUBLE CONCENTRATING ON THINGS, SUCH AS READING THE NEWSPAPER OR WATCHING TELEVISION: 0
SUM OF ALL RESPONSES TO PHQ QUESTIONS 1-9: 0
SUM OF ALL RESPONSES TO PHQ9 QUESTIONS 1 & 2: 0
4. FEELING TIRED OR HAVING LITTLE ENERGY: 0
5. POOR APPETITE OR OVEREATING: 0

## 2022-07-05 NOTE — PROGRESS NOTES
Medicare Annual Wellness Visit    Taina Flores is here for Medicare AWV (Telephone Medicare AWV)    Assessment & Plan    Recommendations for Preventive Services Due: see orders and patient instructions/AVS.  Recommended screening schedule for the next 5-10 years is provided to the patient in written form: see Patient Instructions/AVS.     No follow-ups on file. Subjective       Patient's complete Health Risk Assessment and screening values have been reviewed and are found in Flowsheets. The following problems were reviewed today and where indicated follow up appointments were made and/or referrals ordered. Positive Risk Factor Screenings with Interventions:                  No Positive Risk Factors identified today. Objective      Patient-Reported Vitals  No data recorded            Allergies   Allergen Reactions    Lisinopril Diarrhea and Swelling     Swelling of lips, tongue and face and severe diarrhea     Prior to Visit Medications    Medication Sig Taking? Authorizing Provider   bisoprolol-hydroCHLOROthiazide [facility]) 5-6.25 MG per tablet Take 1 tablet by mouth daily Yes Lizet Perera MD   metFORMIN (GLUCOPHAGE) 500 MG tablet TAKE 2 TABLETS BY MOUTH TWICE A DAY WITH MEALS Yes Lizet Perera MD   simvastatin (ZOCOR) 10 MG tablet TAKE 1 TABLET BY MOUTH EVERY DAY Yes Lizet Perera MD   SITagliptin (JANUVIA) 100 MG tablet TAKE 1 TABLET BY MOUTH EVERY DAY Yes Lizet Perera MD   Multiple Vitamins-Minerals (THERAPEUTIC MULTIVITAMIN-MINERALS) tablet Take 1 tablet by mouth daily Yes Historical Provider, MD   Cyanocobalamin (VITAMIN B 12 PO) Take 2,500 mg by mouth daily Yes Historical Provider, MD   vitamin D (CHOLECALCIFEROL) 50 MCG (2000 UT) TABS tablet Take 2,000 Units by mouth daily Yes Historical Provider, MD   ferrous sulfate 27 MG TABS Take 1 tablet by mouth daily Yes Historical Provider, MD   blood glucose test strips (ACURA BLOOD GLUCOSE TEST) strip Checks 1 time daily. NIDDM--ICD-10 E11.9  Mckayla Hernandez MD   Lancets MISC Checks 1 time daily. NIDDM--ICD-10 E11.9  Mckayla Hernandez MD   Blood Glucose Monitoring Suppl [facility] BLOOD GLUCOSE METER) w/Device KIT 1 Device by Does not apply route 2 times daily DISP WITH TEST STRIP AND LANCETS CHECK BID # 200 3 RF  Mckayla Hernandez MD       CareTeam (Including outside providers/suppliers regularly involved in providing care):   Patient Care Team:  Shannon Robin MD as PCP - General (Internal Medicine)  Shannon Robin MD as PCP - [facility] Provider  Jodi Partida MD as Consulting Physician (Interventional Cardiology)  Sobia Hinson RN as Ambulatory Care Manager     Reviewed and updated this visit:  Tobacco  Allergies  Meds  Med Hx  Surg Hx  Soc Hx  Fam Hx      AVS mailed to philippe Sims LPN, 8/2/7330, performed the documented evaluation under the direct supervision of the attending physician. This encounter was performed under my, Handy Wright, direct supervision, 7/5/2022.

## 2022-07-05 NOTE — PATIENT INSTRUCTIONS
Personalized Preventive Plan for Anastacia  - 7/5/2022  Medicare offers a range of preventive health benefits. Some of the tests and screenings are paid in full while other may be subject to a deductible, co-insurance, and/or copay. Some of these benefits include a comprehensive review of your medical history including lifestyle, illnesses that may run in your family, and various assessments and screenings as appropriate. After reviewing your medical record and screening and assessments performed today your provider may have ordered immunizations, labs, imaging, and/or referrals for you. A list of these orders (if applicable) as well as your Preventive Care list are included within your After Visit Summary for your review. Other Preventive Recommendations:    · A preventive eye exam performed by an eye specialist is recommended every 1-2 years to screen for glaucoma; cataracts, macular degeneration, and other eye disorders. · A preventive dental visit is recommended every 6 months. · Try to get at least 150 minutes of exercise per week or 10,000 steps per day on a pedometer . · Order or download the FREE \"Exercise & Physical Activity: Your Everyday Guide\" from [employer]. Call 8-455.698.3450 or search [employer] online. · You need 2342-4310 mg of calcium and 9277-5106 IU of vitamin D per day. It is possible to meet your calcium requirement with diet alone, but a vitamin D supplement is usually necessary to meet this goal.  · When exposed to the sun, use a sunscreen that protects against both UVA and UVB radiation with an SPF of 30 or greater. Reapply every 2 to 3 hours or after sweating, drying off with a towel, or swimming. · Always wear a seat belt when traveling in a car. Always wear a helmet when riding a bicycle or motorcycle. Heart-Healthy Diet   Sodium, Fat, and Cholesterol Controlled Diet       What Is a Heart Healthy Diet?    A heart-healthy diet is one that limits sodium , certain types of fat , and cholesterol . This type of diet is recommended for:   People with any form of cardiovascular disease (eg, coronary heart disease , peripheral vascular disease , previous heart attack , previous stroke )   People with risk factors for cardiovascular disease, such as high blood pressure , high cholesterol , or diabetes   Anyone who wants to lower their risk of developing cardiovascular disease   Sodium    Sodium is a mineral found in many foods. In general, most people consume much more sodium than they need. Diets high in sodium can increase blood pressure and lead to edema (water retention). On a heart-healthy diet, you should consume no more than 2,300 mg (milligrams) of sodium per dayabout the amount in one teaspoon of table salt. The foods highest in sodium include table salt (about 50% sodium), processed foods, convenience foods, and preserved foods. Cholesterol    Cholesterol is a fat-like, waxy substance in your blood. Our bodies make some cholesterol. It is also found in animal products, with the highest amounts in fatty meat, egg yolks, whole milk, cheese, shellfish, and organ meats. On a heart-healthy diet, you should limit your cholesterol intake to less than 200 mg per day. It is normal and important to have some cholesterol in your bloodstream. But too much cholesterol can cause plaque to build up within your arteries, which can eventually lead to a heart attack or stroke. The two types of cholesterol that are most commonly referred to are:   Low-density lipoprotein (LDL) cholesterol  Also known as bad cholesterol, this is the cholesterol that tends to build up along your arteries. Bad cholesterol levels are increased by eating fats that are saturated or hydrogenated. Optimal level of this cholesterol is less than 100. Over 130 starts to get risky for heart disease.    High-density lipoprotein (HDL) cholesterol  Also known as example, this would mean 60 grams of fat or less per day. Saturated fat and trans fat in your diet raises your blood cholesterol the most, much more than dietary cholesterol does. For this reason, on a heart-healthy diet, less than 7% of your calories should come from saturated fat and ideally 0% from trans fat. On an 1800-calorie diet, this translates into less than 14 grams of saturated fat per day, leaving 46 grams of fat to come from mono- and polyunsaturated fats.    Food Choices on a Heart Healthy Diet   Food Category   Foods Recommended   Foods to Avoid   Grains   Breads and rolls without salted tops Most dry and cooked cereals Unsalted crackers and breadsticks Low-sodium or homemade breadcrumbs or stuffing All rice and pastas   Breads, rolls, and crackers with salted tops High-fat baked goods (eg, muffins, donuts, pastries) Quick breads, self-rising flour, and biscuit mixes Regular bread crumbs Instant hot cereals Commercially prepared rice, pasta, or stuffing mixes   Vegetables   Most fresh, frozen, and low-sodium canned vegetables Low-sodium and salt-free vegetable juices Canned vegetables if unsalted or rinsed   Regular canned vegetables and juices, including sauerkraut and pickled vegetables Frozen vegetables with sauces Commercially prepared potato and vegetable mixes   Fruits   Most fresh, frozen, and canned fruits All fruit juices   Fruits processed with salt or sodium   Milk   Nonfat or low-fat (1%) milk Nonfat or low-fat yogurt Cottage cheese, low-fat ricotta, cheeses labeled as low-fat and low-sodium   Whole milk Reduced-fat (2%) milk Malted and chocolate milk Full fat yogurt Most cheeses (unless low-fat and low salt) Buttermilk (no more than 1 cup per week)   Meats and Beans   Lean cuts of fresh or frozen beef, veal, lamb, or pork (look for the word loin) Fresh or frozen poultry without the skin Fresh or frozen fish and some shellfish Egg whites and egg substitutes (Limit whole eggs to three per and cholesterol amounts. For products low in sodium, look for sodium free, very low sodium, low sodium, no added salt, and unsalted   Skip the salt when cooking or at the table; if food needs more flavor, get creative and try out different herbs and spices. Garlic and onion also add substantial flavor to foods. Trim any visible fat off meat and poultry before cooking, and drain the fat off after lockett. Use cooking methods that require little or no added fat, such as grilling, boiling, baking, poaching, broiling, roasting, steaming, stir-frying, and sauting. Avoid fast food and convenience food. They tend to be high in saturated and trans fat and have a lot of added salt. Talk to a registered dietitian for individualized diet advice. Last Reviewed: March 2011 Mili Delacruz MS, MPH, RD   Updated: 3/29/2011   ·     Keep Your Memory Ebb Morita       Many factors can affect your ability to remembera hectic lifestyle, aging, stress, chronic disease, and certain medicines. But, there are steps you can take to sharpen your mind and help preserve your memory. Challenge Your Brain   Regularly challenging your mind may help keeps it in top shape. Good mental exercises include:   Crossword puzzlesUse a dictionary if you need it; you will learn more that way. Brainteasers Try some! Crafts, such as wood working and sewing   Hobbies, such as gardening and building model airplanes   SocializingVisit old friends or join groups to meet new ones. Reading   Learning a new language   Taking a class, whether it be art history or aminata chi   TravelingExperience the food, history, and culture of your destination   Learning to use a computer   Going to museums, the theater, or thought-provoking movies   Changing things in your daily life, such as reversing your pattern in the grocery store or brushing your teeth using your nondominant hand   Use Memory Aids   There is no need to remember every detail on your own.  These memory aids can help:   Calendars and day planners   Electronic organizers to store all sorts of helpful informationThese devices can \"beep\" to remind you of appointments. A book of days to record birthdays, anniversaries, and other occasions that occur on the same date every year   Detailed \"to-do\" lists and strategically placed sticky notes   Quick \"study\" sessionsBefore a gathering, review who will be there so their names will be fresh in your mind. Establish routinesFor example, keep your keys, wallet, and umbrella in the same place all the time or take medicine with your 8:00 AM glass of juice   Live a Healthy Life   Many actions that will keep your body strong will do the same for your mind. For example:   Talk to Your Doctor About Herbs and Supplements    Malnutrition and vitamin deficiencies can impair your mental function. For example, vitamin B12 deficiency can cause a range of symptoms, including confusion. But, what if your nutritional needs are being met? Can herbs and supplements still offer a benefit? Researchers have investigated a range of natural remedies, such as ginkgo , ginseng , and the supplement phosphatidylserine (PS). So far, though, the evidence is inconsistent as to whether these products can improve memory or thinking. If you are interested in taking herbs and supplements, talk to your doctor first because they may interact with other medicines that you are taking. Exercise Regularly    Among the many benefits of regular exercise are increased blood flow to the brain and decreased risk of certain diseases that can interfere with memory function. One study found that even moderate exercise has a beneficial effect. Examples of \"moderate\" exercise include:   Playing 18 holes of golf once a week, without a cart   Playing tennis twice a week   Walking one mile per day   Manage Stress    It can be tough to remember what is important when your mind is cluttered.  Make time for relaxation. Choose activities that calm you down, and make it routine. Manage Chronic Conditions    Side effects of high blood pressure , diabetes, and heart disease can interfere with mental function. Many of the lifestyle steps discussed here can help manage these conditions. Strive to eat a healthy diet, exercise regularly, get stress under control, and follow your doctor's advice for your condition. Minimize Medications    Talk to your doctor about the medicines that you take. Some may be unnecessary. Also, healthy lifestyle habits may lower the need for certain drugs. Last Reviewed: April 2010 Nicolasa Mecredes MD   Updated: 4/13/2010   ·     [address]       As we get older, changes in balance, gait, strength, vision, hearing, and cognition make even the most youthful senior more prone to accidents. Falls are one of the leading health risks for older people. This increased risk of falling is related to:   Aging process (eg, decreased muscle strength, slowed reflexes)   Higher incidence of chronic health problems (eg, arthritis, diabetes) that may limit mobility, agility or sensory awareness   Side effects of medicine (eg, dizziness, blurred vision)especially medicines like prescription pain medicines and drugs used to treat mental health conditions   Depending on the brittleness of your bones, the consequences of a fall can be serious and long lasting. Home Life   Research by the Association of Aging [facility]) shows that some home accidents among older adults can be prevented by making simple lifestyle changes and basic modifications and repairs to the home environment. Here are some lifestyle changes that experts recommend:   Have your hearing and vision checked regularly. Be sure to wear prescription glasses that are right for you. Speak to your doctor or pharmacist about the possible side effects of your medicines. A number of medicines can cause dizziness.    If you have problems with sleep, talk to your doctor. Limit your intake of alcohol. If necessary, use a cane or walker to help maintain your balance. Wear supportive, rubber-soled shoes, even at home. If you live in a region that gets wintry weather, you may want to put special cleats on your shoes to prevent you from slipping on the snow and ice. Exercise regularly to help maintain muscle tone, agility, and balance. Always hold the banister when going up or down stairs. Also, use  bars when getting in or out of the bath or shower, or using the toilet. To avoid dizziness, get up slowly from a lying down position. Sit up first, dangling your legs for a minute or two before rising to a standing position. Overall Home Safety Check   According to the Consumer [employer] Safety Commision's \"Older Consumer Home Safety Checklist,\" it is important to check for potential hazards in each room. And remember, proper lighting is an essential factor in home safety. If you cannot see clearly, you are more likely to fall. Important questions to ask yourself include:   Are lamp, electric, extension, and telephone cords placed out of the flow of traffic and maintained in good condition? Have frayed cords been replaced? Are all small rugs and runners slip resistant? If not, you can secure them to the floor with a special double-sided carpet tape. Are smoke detectors properly locatedone on every floor of your home and one outside of every sleeping area? Are they in good working order? Are batteries replaced at least once a year? Do you have a well-maintained carbon monoxide detector outside every sleeping are in your home? Does your furniture layout leave plenty of space to maneuver between and around chairs, tables, beds, and sofas? Are hallways, stairs and passages between rooms well lit? Can you reach a lamp without getting out of bed? Are floor surfaces well maintained?  Shag rugs, high-pile carpeting, tile floors, and polished wood floors can be particularly slippery. Stairs should always have handrails and be carpeted or fitted with a non-skid tread. Is your telephone easily reachable. Is the cord safely tucked away? Room by Room   According to the Association of Aging, bathrooms and shahana are the two most potentially hazardous rooms in your home. In the Kitchen    Be sure your stove is in proper working order and always make sure burners and the oven are off before you go out or go to sleep. Keep pots on the back burners, turn handles away from the front of the stove, and keep stove clean and free of grease build-up. Kitchen ventilation systems and range exhausts should be working properly. Keep flammable objects such as towels and pot holders away from the cooking area except when in use. Make sure kitchen curtains are tied back. Move cords and appliances away from the sink and hot surfaces. If extension cords are needed, install wiring guides so they do not hang over the sink, range, or working areas. Look for coffee pots, kettles and toaster ovens with automatic shut-offs. Keep a mop handy in the kitchen so you can wipe up spills instantly. You should also have a small fire extinguisher. Arrange your kitchen with frequently used items on lower shelves to avoid the need to stand on a stepstool to reach them. Make sure countertops are well-lit to avoid injuries while cutting and preparing food. In the Bathroom    Use a non-slip mat or decals in the tub and shower, since wet, soapy tile or porcelain surfaces are extremely slippery. Make sure bathroom rugs are non-skid or tape them firmly to the floor. Bathtubs should have at least one, preferably two, grab bars, firmly attached to structural supports in the wall. (Do not use built-in soap holders or glass shower doors as grab bars.)    Tub seats fitted with non-slip material on the legs allow you to wash sitting down.  For people with limited mobility, bathtub transfer benches allow you to slide safely into the tub. Raised toilet seats and toilet safety rails are helpful for those with knee or hip problems. In the [facility]    Make sure you use a nightlight and that the area around your bed is clear of potential obstacles. Be careful with electric blankets and never go to sleep with a heating pad, which can cause serious burns even if on a low setting. Use fire-resistant mattress covers and pillows, and NEVER smoke in bed. Keep a phone next to the bed that is programmed to dial 911 at the push of a button. If you have a chronic condition, you may want to sign on with an automatic call-in service. Typically the system includes a small pendant that connects directly to an emergency medical voice-response system. You should also make arrangements to stay in contact with someonefriend, neighbor, family memberon a regular schedule. Fire Prevention   According to the [employer]. (Smoke Alarms for Every) [address] [place] [address], senior citizens are one of the two highest risk groups for death and serious injuries due to residential fires. When cooking, wear short-sleeved items, never a bulky long-sleeved robe. The [facility]'s Safety Checklist for Older Consumers emphasizes the importance of checking basements, garages, workshops and storage areas for fire hazards, such as volatile liquids, piles of old rags or clothing and overloaded circuits. Never smoke in bed or when lying down on a couch or recliner chair. Small portable electric or kerosene heaters are responsible for many home fires and should be used cautiously if at all. If you do use one, be sure to keep them away from flammable materials. In case of fire, make sure you have a pre-established emergency exit plan. Have a professional check your fireplace and other fuel-burning appliances yearly.     Helping Hands   Baby boomers entering the spencer years will continue to see the development of new products to help older adults live safely and independently in spite of age-related changes. Making Life More Livable  , by Marilyn Hitchcock, lists over 1,000 products for \"living well in the mature years,\" such as bathing and mobility aids, household security devices, ergonomically designed knives and peelers, and faucet valves and knobs for temperature control. Medical supply stores and organizations are good sources of information about products that improve your quality of life and insure your safety.      Last Reviewed: November 2009 Gini Santana MD   Updated: 3/7/2011     ·

## 2022-07-14 ENCOUNTER — IMMUNIZATION (OUTPATIENT)
Dept: FAMILY MEDICINE CLINIC | Age: 67
End: 2022-07-14
Payer: MEDICARE

## 2022-07-14 PROCEDURE — 0064A COVID-19, MODERNA BOOSTER BLUE BORDER, (AGE 18Y+), IM, 50MCG/0.25ML: CPT | Performed by: FAMILY MEDICINE

## 2022-07-14 PROCEDURE — 91306 COVID-19, MODERNA BOOSTER BLUE BORDER, (AGE 18Y+), IM, 50MCG/0.25ML: CPT | Performed by: FAMILY MEDICINE

## 2022-07-15 ENCOUNTER — CARE COORDINATION (OUTPATIENT)
Dept: CARE COORDINATION | Age: 67
End: 2022-07-15

## 2022-07-18 ENCOUNTER — CARE COORDINATION (OUTPATIENT)
Dept: CARE COORDINATION | Age: 67
End: 2022-07-18

## 2022-07-18 NOTE — CARE COORDINATION
Ambulatory Care Coordination Note  7/18/2022    ACC: Doreen Camara RN    Summary Note: Patient returned ACM call from last week. Patient reports he had appointment with Zahra Duffy MD to establish care. Patient states his A1C has increased to 8.7. Patient states he knows this is due to eating out frequently. Discussed reaching back out to [address] [place] [address]. Patient declined. Stated he knows what he is dong wrong. Discussed diet. Discussed healthier food choices and eating out. Reviewed plate method. Discussed sending additional handouts by mail. Discussed plan to graduate from care Coordination. Patient verbalizes agreement. Patient instructed to contact [facility] should he wish to receive additional support for diabetes management. Informed patient he can re-enroll. Informed patient that [facility] will no longer be reaching out on a routine basis. Patient graduated from Care Coordination. Reviewed scheduled follow-up with Zahra Duffy MD.   A CC letter with Simple Swaps & Substitutions, Dinning Out With Diabetes, and Planning Healthy Meals handouts sent by My Chart Message and by mail. Lab Results   Component Value Date    LABA1C 8.7 06/30/2022    LABA1C 7.4 01/20/2022    LABA1C 7.6 (H) 05/12/2021        Diabetes Assessment      Meal Planning: None   How often do you test your blood sugar?: Other (Comment: 2-3 times a week)   Do you have barriers with adherence to non-pharmacologic self-management interventions? (Nutrition/Exercise/Self-Monitoring): No   Have you ever had to go to the ED for symptoms of low blood sugar?: No       Increase or Decrease trend in Blood Sugars   Do you have hyperglycemia symptoms?: No   Do you have hypoglycemia symptoms?: No   Blood Sugar Trends: Steady Increase (Comment: Recent A1C increased.  States he knows increase due to eating out frequently.)         and   General Assessment    Do you have any symptoms that are causing concern?: No           Lab Results       None            Care Coordination Interventions    Program Enrollment: Rising Risk  Referral from Primary Care Provider: No  Suggested Interventions and Community Resources  Pharmacist: Completed (Comment: 2/2/2022 Clinical Rx)  Registered Dietician: Completed (Comment: 2/2/2022 [address] [place] [address])  Zone Management Tools: Completed (Comment: /2/2022 Diabetes Zones)  Other Services or Interventions: 2/2/2022 Instructed on same day, next day, and Walk-In Care. Goals Addressed                   This Visit's Progress     COMPLETED: Conditions and Symptoms   On track     I will schedule office visits, as directed by my provider. I will keep my appointment or reschedule if I have to cancel. I will notify my provider of any barriers to my plan of care. I will follow my Zone Management tool to seek urgent or emergent care. I will notify my provider of any symptoms that indicate a worsening of my condition. Diabetes  HTN    Barriers: lack of education  Plan for overcoming my barriers: Care Coordination, Clinical Rx, ACC Dietician  Confidence: 10/10  Anticipated Goal Completion Date: 7/2/2022                Prior to Admission medications    Medication Sig Start Date End Date Taking?  Authorizing Provider   bisoprolol-hydroCHLOROthiazide [facility]) 5-6.25 MG per tablet Take 1 tablet by mouth daily 6/30/22   Jovana Johnson MD   metFORMIN (GLUCOPHAGE) 500 MG tablet TAKE 2 TABLETS BY MOUTH TWICE A DAY WITH MEALS 6/30/22   Jovana Johnson MD   simvastatin (ZOCOR) 10 MG tablet TAKE 1 TABLET BY MOUTH EVERY DAY 6/30/22   Jovana Johnson MD   SITagliptin (JANUVIA) 100 MG tablet TAKE 1 TABLET BY MOUTH EVERY DAY 6/30/22   Jovana Johnson MD   Multiple Vitamins-Minerals (THERAPEUTIC MULTIVITAMIN-MINERALS) tablet Take 1 tablet by mouth daily    Historical Provider, MD   Cyanocobalamin (VITAMIN B 12 PO) Take 2,500 mg by mouth daily    Historical Provider, MD   vitamin D (CHOLECALCIFEROL) 50 MCG (2000 UT) TABS tablet Take 2,000 Units by mouth daily

## 2022-07-18 NOTE — LETTER
[address]      Dear Jazmin Adorno,     I hope this letter finds you doing well! I am sending you patient education handouts that I felt would be useful to you. I hope you find the information helpful. Please look them over and let me know if you have any questions. You can contact me at  the number listed below. Sincerely,    Gianna Ventura RN, RN, BSN, Nurse care Coordinator  [address] [place] [address]  Cell Phone: [phone]  Email: [email or web address]      Enclosed: Simple Swaps & Substitutions, Dinning Out With Diabetes, and Planning Healthy Meals.

## 2022-08-04 ENCOUNTER — TELEPHONE (OUTPATIENT)
Dept: FAMILY MEDICINE CLINIC | Age: 67
End: 2022-08-04

## 2022-08-04 NOTE — TELEPHONE ENCOUNTER
Chepe Allan from [facility] called to speak with Dr. Alesia Bosworth Wants to inform him the patient risk assessment has been completed and the plan of care has changed. If he would like to attend their rounds meeting - every Thursday at 3:00 - he can call the number below or contact them via email. [phone] option 3; [email or web address]

## 2022-12-30 ENCOUNTER — OFFICE VISIT (OUTPATIENT)
Dept: FAMILY MEDICINE CLINIC | Age: 67
End: 2022-12-30
Payer: MEDICARE

## 2022-12-30 VITALS
BODY MASS INDEX: 25.2 KG/M2 | OXYGEN SATURATION: 97 % | SYSTOLIC BLOOD PRESSURE: 120 MMHG | HEIGHT: 70 IN | HEART RATE: 79 BPM | RESPIRATION RATE: 14 BRPM | DIASTOLIC BLOOD PRESSURE: 68 MMHG | WEIGHT: 176 LBS

## 2022-12-30 DIAGNOSIS — E78.2 MIXED HYPERLIPIDEMIA: ICD-10-CM

## 2022-12-30 DIAGNOSIS — E11.9 TYPE 2 DIABETES MELLITUS WITHOUT COMPLICATION, WITHOUT LONG-TERM CURRENT USE OF INSULIN (HCC): Primary | ICD-10-CM

## 2022-12-30 DIAGNOSIS — I10 ESSENTIAL HYPERTENSION: ICD-10-CM

## 2022-12-30 LAB — HBA1C MFR BLD: 10.3 %

## 2022-12-30 PROCEDURE — 99214 OFFICE O/P EST MOD 30 MIN: CPT | Performed by: INTERNAL MEDICINE

## 2022-12-30 PROCEDURE — 1123F ACP DISCUSS/DSCN MKR DOCD: CPT | Performed by: INTERNAL MEDICINE

## 2022-12-30 PROCEDURE — 83036 HEMOGLOBIN GLYCOSYLATED A1C: CPT | Performed by: INTERNAL MEDICINE

## 2022-12-30 PROCEDURE — 3078F DIAST BP <80 MM HG: CPT | Performed by: INTERNAL MEDICINE

## 2022-12-30 PROCEDURE — 3074F SYST BP LT 130 MM HG: CPT | Performed by: INTERNAL MEDICINE

## 2022-12-30 RX ORDER — GLIPIZIDE 5 MG/1
5 TABLET ORAL 2 TIMES DAILY
Qty: 60 TABLET | Refills: 3 | Status: SHIPPED | OUTPATIENT
Start: 2022-12-30

## 2022-12-30 ASSESSMENT — ENCOUNTER SYMPTOMS
ABDOMINAL PAIN: 0
EYE REDNESS: 0
CHEST TIGHTNESS: 0
RHINORRHEA: 0
BLOOD IN STOOL: 0
DIARRHEA: 0
SINUS PAIN: 0
FACIAL SWELLING: 0
WHEEZING: 0
SHORTNESS OF BREATH: 0
RECTAL PAIN: 0
ABDOMINAL DISTENTION: 0
PHOTOPHOBIA: 0
BACK PAIN: 0
CONSTIPATION: 0
EYE PAIN: 0
VOMITING: 0
TROUBLE SWALLOWING: 0
APNEA: 0
VOICE CHANGE: 0
COLOR CHANGE: 0
SINUS PRESSURE: 0
SORE THROAT: 0
COUGH: 0
NAUSEA: 0
EYE DISCHARGE: 0
EYE ITCHING: 0

## 2023-01-03 RX ORDER — GLUCOSAMINE HCL/CHONDROITIN SU 500-400 MG
CAPSULE ORAL
Qty: 100 STRIP | Refills: 3 | Status: SHIPPED | OUTPATIENT
Start: 2023-01-03 | End: 2023-01-04 | Stop reason: SDUPTHER

## 2023-01-03 RX ORDER — LANCETS 30 GAUGE
EACH MISCELLANEOUS
Qty: 100 EACH | Refills: 3 | Status: SHIPPED | OUTPATIENT
Start: 2023-01-03 | End: 2023-01-04 | Stop reason: SDUPTHER

## 2023-01-03 NOTE — TELEPHONE ENCOUNTER
Patient is requesting a refill of test strips and lancets. Unable to pend the test strips. Lancets are pended with the correct pharmacy. Please advise. Thank you.

## 2023-01-04 ENCOUNTER — TELEPHONE (OUTPATIENT)
Dept: FAMILY MEDICINE CLINIC | Age: 68
End: 2023-01-04

## 2023-01-04 RX ORDER — LANCETS 30 GAUGE
EACH MISCELLANEOUS
Qty: 100 EACH | Refills: 3 | Status: SHIPPED | OUTPATIENT
Start: 2023-01-04

## 2023-01-04 RX ORDER — GLUCOSAMINE HCL/CHONDROITIN SU 500-400 MG
CAPSULE ORAL
Qty: 100 STRIP | Refills: 3 | Status: SHIPPED | OUTPATIENT
Start: 2023-01-04

## 2023-01-04 RX ORDER — GLUCOSAMINE HCL/CHONDROITIN SU 500-400 MG
CAPSULE ORAL
Qty: 100 STRIP | Refills: 5 | Status: SHIPPED | OUTPATIENT
Start: 2023-01-04

## 2023-01-04 NOTE — TELEPHONE ENCOUNTER
Patient called in to request a new RX for test stips and lancets for a new machine he needs them for his  Accu-Chek Marilu plus system.  Please send these to [address] [place] [address] he will be out of these supplies by friday

## 2023-01-23 RX ORDER — GLIPIZIDE 5 MG/1
5 TABLET ORAL 2 TIMES DAILY
Qty: 60 TABLET | Refills: 3 | Status: SHIPPED | OUTPATIENT
Start: 2023-01-23

## 2023-01-23 NOTE — TELEPHONE ENCOUNTER
Future Appointments    Encounter Information    Provider Department Appt Notes   3/3/2023 Rhonda Winn MD [facility] AT [facility] 9 week follow up     Past Visits    Date Provider Specialty Visit Type Primary Dx   12/30/2022 Rhonda Winn MD Family Medicine Office Visit Type 2 diabetes mellitus without complication, without long-term current use of insulin ([facility] [place] [address]

## 2023-03-09 ENCOUNTER — OFFICE VISIT (OUTPATIENT)
Dept: FAMILY MEDICINE CLINIC | Age: 68
End: 2023-03-09

## 2023-03-09 VITALS
OXYGEN SATURATION: 97 % | SYSTOLIC BLOOD PRESSURE: 130 MMHG | DIASTOLIC BLOOD PRESSURE: 80 MMHG | BODY MASS INDEX: 26.2 KG/M2 | HEIGHT: 70 IN | WEIGHT: 183 LBS | RESPIRATION RATE: 14 BRPM | HEART RATE: 76 BPM

## 2023-03-09 DIAGNOSIS — E78.2 MIXED HYPERLIPIDEMIA: ICD-10-CM

## 2023-03-09 DIAGNOSIS — I10 ESSENTIAL HYPERTENSION: ICD-10-CM

## 2023-03-09 DIAGNOSIS — E11.9 TYPE 2 DIABETES MELLITUS WITHOUT COMPLICATION, WITHOUT LONG-TERM CURRENT USE OF INSULIN (HCC): ICD-10-CM

## 2023-03-09 DIAGNOSIS — E11.9 TYPE 2 DIABETES MELLITUS WITHOUT COMPLICATION, WITHOUT LONG-TERM CURRENT USE OF INSULIN (HCC): Primary | ICD-10-CM

## 2023-03-09 LAB
ANION GAP SERPL CALCULATED.3IONS-SCNC: 10 MEQ/L (ref 9–15)
BUN BLDV-MCNC: 20 MG/DL (ref 8–23)
CALCIUM SERPL-MCNC: 9.5 MG/DL (ref 8.5–9.9)
CHLORIDE BLD-SCNC: 99 MEQ/L (ref 95–107)
CO2: 26 MEQ/L (ref 20–31)
CREAT SERPL-MCNC: 1.39 MG/DL (ref 0.7–1.2)
GFR SERPL CREATININE-BSD FRML MDRD: 54.6 ML/MIN/{1.73_M2}
GLUCOSE BLD-MCNC: 162 MG/DL (ref 70–99)
HBA1C MFR BLD: 7.5 %
POTASSIUM SERPL-SCNC: 4.2 MEQ/L (ref 3.4–4.9)
SODIUM BLD-SCNC: 135 MEQ/L (ref 135–144)

## 2023-03-09 SDOH — ECONOMIC STABILITY: FOOD INSECURITY: WITHIN THE PAST 12 MONTHS, YOU WORRIED THAT YOUR FOOD WOULD RUN OUT BEFORE YOU GOT MONEY TO BUY MORE.: NEVER TRUE

## 2023-03-09 SDOH — ECONOMIC STABILITY: INCOME INSECURITY: HOW HARD IS IT FOR YOU TO PAY FOR THE VERY BASICS LIKE FOOD, HOUSING, MEDICAL CARE, AND HEATING?: NOT HARD AT ALL

## 2023-03-09 SDOH — ECONOMIC STABILITY: FOOD INSECURITY: WITHIN THE PAST 12 MONTHS, THE FOOD YOU BOUGHT JUST DIDN'T LAST AND YOU DIDN'T HAVE MONEY TO GET MORE.: NEVER TRUE

## 2023-03-09 SDOH — ECONOMIC STABILITY: HOUSING INSECURITY
IN THE LAST 12 MONTHS, WAS THERE A TIME WHEN YOU DID NOT HAVE A STEADY PLACE TO SLEEP OR SLEPT IN A SHELTER (INCLUDING NOW)?: NO

## 2023-03-09 ASSESSMENT — ENCOUNTER SYMPTOMS
NAUSEA: 0
SINUS PRESSURE: 0
EYE REDNESS: 0
ABDOMINAL PAIN: 0
PHOTOPHOBIA: 0
DIARRHEA: 0
RHINORRHEA: 0
COLOR CHANGE: 0
WHEEZING: 0
VOMITING: 0
BACK PAIN: 0
SINUS PAIN: 0
SORE THROAT: 0
EYE PAIN: 0
CHEST TIGHTNESS: 0
APNEA: 0
EYE ITCHING: 0
VOICE CHANGE: 0
SHORTNESS OF BREATH: 0
TROUBLE SWALLOWING: 0
BLOOD IN STOOL: 0
EYE DISCHARGE: 0
CONSTIPATION: 0
RECTAL PAIN: 0
ABDOMINAL DISTENTION: 0
COUGH: 0
FACIAL SWELLING: 0

## 2023-03-09 ASSESSMENT — PATIENT HEALTH QUESTIONNAIRE - PHQ9
SUM OF ALL RESPONSES TO PHQ9 QUESTIONS 1 & 2: 0
SUM OF ALL RESPONSES TO PHQ QUESTIONS 1-9: 0
2. FEELING DOWN, DEPRESSED OR HOPELESS: 0
1. LITTLE INTEREST OR PLEASURE IN DOING THINGS: 0
SUM OF ALL RESPONSES TO PHQ QUESTIONS 1-9: 0

## 2023-03-09 NOTE — PROGRESS NOTES
Subjective:      Patient ID: Ana Tierney is a 69 y.o. male Established patient, here for evaluation of the following chief complaint(s):  Chief Complaint   Patient presents with    Diabetes       HPI  76-year-old type II diabetic hyperlipidemic hypertensive male presents to establish continuity with me as his primary care doctor. Type 2 diabetes mellitus without complication, without long-term current use of insulin (HCC)-compliant with Trulicity 9.39 mg weekly and glipizide 5 mg twice daily. Hemoglobin A1c= 7.5 today. Mixed hyperlipidemia-compliant with Zocor 10 mg daily        Essential hypertension daily-compliant with Ziac 5-6 0.25 mg daily        At present he denies polyuria,  Polydipsia, constitutional, sinus, visual, cardiopulmonary, urologic, gastrointestinal, immunologic/hematologic, musculoskeletal, neurologic,dermatologic, or psychiatric complaints. Current Outpatient Medications on File Prior to Visit   Medication Sig Dispense Refill    glipiZIDE (GLUCOTROL) 5 MG tablet TAKE 1 TABLET BY MOUTH 2 TIMES DAILY 60 tablet 3    blood glucose monitor strips Test 2  times a day & as needed for symptoms of irregular blood glucose. Dispense sufficient amount for indicated testing frequency plus additional to accommodate PRN testing needs. PATIENT USES Accu-Chek Marilu plus system 100 strip 5    blood glucose monitor strips Test one time a day & as needed for symptoms of irregular blood glucose. Dispense sufficient amount for indicated testing frequency plus additional to accommodate PRN testing needs. 100 strip 3    blood glucose monitor supplies Lancets,  Test 2 times a day & as needed for symptoms of irregular blood glucose. Dispense sufficient amount for indicated testing frequency plus additional to accommodate PRN testing needs. PATIENT USES Accu-Chek Marilu plus system 100 each 5    Lancets MISC Checks 1 time daily. NIDDM--ICD-10 E11.9 100 each 3    bisoprolol-hydroCHLOROthiazide [facility]) 5-6.25 MG per tablet Take 1 tablet by mouth daily 90 tablet 3    simvastatin (ZOCOR) 10 MG tablet TAKE 1 TABLET BY MOUTH EVERY DAY 90 tablet 3    Multiple Vitamins-Minerals (THERAPEUTIC MULTIVITAMIN-MINERALS) tablet Take 1 tablet by mouth daily      Cyanocobalamin (VITAMIN B 12 PO) Take 2,500 mg by mouth daily      vitamin D (CHOLECALCIFEROL) 50 MCG (2000 UT) TABS tablet Take 2,000 Units by mouth daily      ferrous sulfate 27 MG TABS Take 1 tablet by mouth daily      blood glucose test strips (ACURA BLOOD GLUCOSE TEST) strip Checks 1 time daily. NIDDM--ICD-10 E11.9 100 each 3    Blood Glucose Monitoring Suppl (ACURA BLOOD GLUCOSE METER) w/Device KIT 1 Device by Does not apply route 2 times daily DISP WITH TEST STRIP AND LANCETS CHECK BID # 200 3 RF 1 kit 0     No current facility-administered medications on file prior to visit. Lisinopril    Review of Systems   Constitutional:  Negative for chills, diaphoresis, fatigue and fever. HENT:  Negative for congestion, dental problem, drooling, ear discharge, ear pain, facial swelling, hearing loss, mouth sores, nosebleeds, postnasal drip, rhinorrhea, sinus pressure, sinus pain, sneezing, sore throat, tinnitus, trouble swallowing and voice change. Eyes:  Negative for photophobia, pain, discharge, redness, itching and visual disturbance. Respiratory:  Negative for apnea, cough, chest tightness, shortness of breath and wheezing. Cardiovascular:  Negative for chest pain, palpitations and leg swelling. Gastrointestinal:  Negative for abdominal distention, abdominal pain, blood in stool, constipation, diarrhea, nausea, rectal pain and vomiting. Endocrine: Negative for cold intolerance, heat intolerance, polydipsia, polyphagia and polyuria. Genitourinary:  Negative for decreased urine volume, difficulty urinating, dysuria, flank pain, frequency, genital sores, hematuria and urgency.    Musculoskeletal:  Negative for arthralgias, back pain, gait problem, joint swelling, myalgias, neck pain and neck stiffness. Skin:  Negative for color change, rash and wound. Allergic/Immunologic: Negative for environmental allergies and food allergies. Neurological:  Negative for dizziness, tremors, seizures, syncope, facial asymmetry, speech difficulty, weakness, light-headedness, numbness and headaches. Hematological:  Negative for adenopathy. Does not bruise/bleed easily. Psychiatric/Behavioral:  Negative for agitation, confusion, decreased concentration, hallucinations, self-injury, sleep disturbance and suicidal ideas. The patient is not nervous/anxious. Objective:   /80   Pulse 76   Resp 14   Ht 5' 10\" (1.778 m)   Wt 183 lb (83 kg)   SpO2 97%   BMI 26.26 kg/m²     Physical Exam  Constitutional:       General: He is not in acute distress. Appearance: He is well-developed. HENT:      Head: Normocephalic. Right Ear: External ear normal.      Left Ear: External ear normal.   Eyes:      Conjunctiva/sclera: Conjunctivae normal.   Neck:      Vascular: No JVD. Trachea: No tracheal deviation. Cardiovascular:      Rate and Rhythm: Normal rate and regular rhythm. Heart sounds: Normal heart sounds. Pulmonary:      Effort: Pulmonary effort is normal. No respiratory distress. Breath sounds: Normal breath sounds. No wheezing or rales. Chest:      Chest wall: No tenderness. Abdominal:      General: Bowel sounds are normal. There is no distension. Palpations: Abdomen is soft. There is no mass. Tenderness: There is no abdominal tenderness. There is no guarding or rebound. Musculoskeletal:         General: No tenderness or deformity. Cervical back: Neck supple. Skin:     General: Skin is warm and dry. Coloration: Skin is not pale. Findings: No erythema or rash. Neurological:      Mental Status: He is alert and oriented to person, place, and time. Cranial Nerves: No cranial nerve deficit.       Motor: No abnormal muscle tone. Psychiatric:         Thought Content: Thought content normal.         Judgment: Judgment normal.       Assessment:       Diagnosis Orders   1. Type 2 diabetes mellitus without complication, without long-term current use of insulin ([facility])  Basic Metabolic Panel    POCT glycosylated hemoglobin (Hb A1C)      2. Essential hypertension        3. Mixed hyperlipidemia             No results found for: LIPIDPAN, BMP, CMP, CBC, CBCAUTODIF  Plan:      Darrion Tillman was seen today for diabetes. Diagnoses and all orders for this visit:    Type 2 [facility] without complication[facility]  -     Basic Metabolic Panel; Future  -     POCT glycosylated hemoglobin (Hb M3K)  -Trulicity 2.55 mg weekly and glipizide 5 mg twice daily    Essential hypertension-Ziac 5-6.25 mg daily        Mixed hyperlipidemia-Zocor 10 mg orally daily           Return in about 3 months (around 6/9/2023). On this date 03/09/23 I have spent 30 minutes reviewing previous notes, test results and face to face with the patient discussing the diagnosis and importance of compliance with the treatment plan. Benjamin Espinal MD    Please note, this report has been partially produced using speech recognition software  and may cause  and /or contain errors related to that system including grammar, punctuation and spelling as well as words and phrases that may seem inappropriate. If there are questions or concerns please feel free to contact me to clarify.

## 2023-04-26 RX ORDER — GLIPIZIDE 5 MG/1
TABLET ORAL
Qty: 180 TABLET | Refills: 1 | Status: SHIPPED | OUTPATIENT
Start: 2023-04-26

## 2023-04-26 NOTE — TELEPHONE ENCOUNTER
Future Appointments    Encounter Information    Provider Department Appt Notes   6/9/2023 James Tolentino MD [facility] [facility] 3 months follow up     Past Visits    Date Provider Specialty Visit Type Primary Dx   03/09/2023 James Tolentino MD Family Medicine Office Visit Type 2 diabetes mellitus without complication, without long-term current use of insulin

## 2023-06-08 ASSESSMENT — ENCOUNTER SYMPTOMS
SHORTNESS OF BREATH: 0
COLOR CHANGE: 0
BACK PAIN: 0
BLOOD IN STOOL: 0
EYE ITCHING: 0
RECTAL PAIN: 0
VOICE CHANGE: 0
WHEEZING: 0
ABDOMINAL DISTENTION: 0
PHOTOPHOBIA: 0
DIARRHEA: 0
SINUS PAIN: 0
TROUBLE SWALLOWING: 0
EYE REDNESS: 0
CHEST TIGHTNESS: 0
ABDOMINAL PAIN: 0
APNEA: 0
RHINORRHEA: 0
SINUS PRESSURE: 0
CONSTIPATION: 0
NAUSEA: 0
FACIAL SWELLING: 0
EYE PAIN: 0
SORE THROAT: 0
VOMITING: 0
COUGH: 0
EYE DISCHARGE: 0

## 2023-06-08 NOTE — PROGRESS NOTES
Subjective:      Patient ID: Hamida Shane is a 77 y.o. male Established patient, here for evaluation of the following chief complaint(s):  Chief Complaint   Patient presents with    Diabetes       HPI  68-year-old type II diabetic hyperlipidemic hypertensive male presents for f/u visit. Type 2 diabetes mellitus without complication, without long-term current use of insulin (HCC)-compliant with Trulicity 3.14 mg weekly and glipizide 5 mg twice daily. Hemoglobin A1c= 8.0 today. Mixed hyperlipidemia-compliant with Zocor 10 mg daily        Essential hypertension daily-compliant with Ziac 5-6 0.25 mg daily        At present he denies polyuria,  Polydipsia, constitutional, sinus, visual, cardiopulmonary, urologic, gastrointestinal, immunologic/hematologic, musculoskeletal, neurologic,dermatologic, or psychiatric complaints. Current Outpatient Medications on File Prior to Visit   Medication Sig Dispense Refill    glipiZIDE (GLUCOTROL) 5 MG tablet Take 1 tablet by mouth daily 90 tablet 2    blood glucose monitor strips Test 2  times a day & as needed for symptoms of irregular blood glucose. Dispense sufficient amount for indicated testing frequency plus additional to accommodate PRN testing needs. PATIENT USES Accu-Chek Marilu plus system 100 strip 5    blood glucose monitor strips Test one time a day & as needed for symptoms of irregular blood glucose. Dispense sufficient amount for indicated testing frequency plus additional to accommodate PRN testing needs. 100 strip 3    blood glucose monitor supplies Lancets,  Test 2 times a day & as needed for symptoms of irregular blood glucose. Dispense sufficient amount for indicated testing frequency plus additional to accommodate PRN testing needs. PATIENT USES Accu-Chek Marilu plus system 100 each 5    Lancets MISC Checks 1 time daily. NIDDM--ICD-10 E11.9 100 each 3    Multiple Vitamins-Minerals (THERAPEUTIC MULTIVITAMIN-MINERALS) tablet Take 1 tablet by

## 2023-06-09 ENCOUNTER — OFFICE VISIT (OUTPATIENT)
Dept: FAMILY MEDICINE CLINIC | Age: 68
End: 2023-06-09
Payer: MEDICARE

## 2023-06-09 VITALS
RESPIRATION RATE: 14 BRPM | BODY MASS INDEX: 26.63 KG/M2 | SYSTOLIC BLOOD PRESSURE: 114 MMHG | HEIGHT: 70 IN | DIASTOLIC BLOOD PRESSURE: 80 MMHG | OXYGEN SATURATION: 97 % | HEART RATE: 67 BPM | WEIGHT: 186 LBS

## 2023-06-09 DIAGNOSIS — I10 ESSENTIAL HYPERTENSION: ICD-10-CM

## 2023-06-09 DIAGNOSIS — L98.9 SKIN LESION: ICD-10-CM

## 2023-06-09 DIAGNOSIS — E78.2 MIXED HYPERLIPIDEMIA: ICD-10-CM

## 2023-06-09 DIAGNOSIS — E11.9 TYPE 2 DIABETES MELLITUS WITHOUT COMPLICATION, WITHOUT LONG-TERM CURRENT USE OF INSULIN (HCC): Primary | ICD-10-CM

## 2023-06-09 LAB — HBA1C MFR BLD: 8 %

## 2023-06-09 PROCEDURE — 3052F HG A1C>EQUAL 8.0%<EQUAL 9.0%: CPT | Performed by: INTERNAL MEDICINE

## 2023-06-09 PROCEDURE — 3074F SYST BP LT 130 MM HG: CPT | Performed by: INTERNAL MEDICINE

## 2023-06-09 PROCEDURE — 83036 HEMOGLOBIN GLYCOSYLATED A1C: CPT | Performed by: INTERNAL MEDICINE

## 2023-06-09 PROCEDURE — 3079F DIAST BP 80-89 MM HG: CPT | Performed by: INTERNAL MEDICINE

## 2023-06-09 PROCEDURE — 99214 OFFICE O/P EST MOD 30 MIN: CPT | Performed by: INTERNAL MEDICINE

## 2023-06-09 PROCEDURE — 1123F ACP DISCUSS/DSCN MKR DOCD: CPT | Performed by: INTERNAL MEDICINE

## 2023-06-09 RX ORDER — SIMVASTATIN 10 MG
TABLET ORAL
Qty: 90 TABLET | Refills: 3 | Status: SHIPPED | OUTPATIENT
Start: 2023-06-09

## 2023-06-09 RX ORDER — BISOPROLOL FUMARATE AND HYDROCHLOROTHIAZIDE 5; 6.25 MG/1; MG/1
1 TABLET ORAL DAILY
Qty: 90 TABLET | Refills: 3 | Status: SHIPPED | OUTPATIENT
Start: 2023-06-09

## 2023-06-09 RX ORDER — GLIPIZIDE 5 MG/1
5 TABLET ORAL DAILY
Qty: 90 TABLET | Refills: 2 | COMMUNITY
Start: 2023-06-09

## 2023-06-09 RX ORDER — GLIPIZIDE 5 MG/1
5 TABLET ORAL 2 TIMES DAILY
Qty: 180 TABLET | Refills: 1 | Status: SHIPPED | OUTPATIENT
Start: 2023-06-09 | End: 2023-06-09 | Stop reason: SDUPTHER

## 2023-10-05 ENCOUNTER — APPOINTMENT (OUTPATIENT)
Dept: PRIMARY CARE | Facility: CLINIC | Age: 68
End: 2023-10-05
Payer: MEDICARE

## 2023-10-24 ENCOUNTER — OFFICE VISIT (OUTPATIENT)
Dept: FAMILY MEDICINE CLINIC | Age: 68
End: 2023-10-24
Payer: MEDICARE

## 2023-10-24 VITALS
TEMPERATURE: 97.9 F | OXYGEN SATURATION: 97 % | HEIGHT: 70 IN | DIASTOLIC BLOOD PRESSURE: 76 MMHG | WEIGHT: 180.6 LBS | BODY MASS INDEX: 25.86 KG/M2 | HEART RATE: 80 BPM | SYSTOLIC BLOOD PRESSURE: 132 MMHG

## 2023-10-24 DIAGNOSIS — J20.8 ACUTE BRONCHITIS DUE TO OTHER SPECIFIED ORGANISMS: Primary | ICD-10-CM

## 2023-10-24 PROCEDURE — 1123F ACP DISCUSS/DSCN MKR DOCD: CPT | Performed by: FAMILY MEDICINE

## 2023-10-24 PROCEDURE — 3075F SYST BP GE 130 - 139MM HG: CPT | Performed by: FAMILY MEDICINE

## 2023-10-24 PROCEDURE — 3078F DIAST BP <80 MM HG: CPT | Performed by: FAMILY MEDICINE

## 2023-10-24 PROCEDURE — 99213 OFFICE O/P EST LOW 20 MIN: CPT | Performed by: FAMILY MEDICINE

## 2023-10-24 RX ORDER — DEXTROMETHORPHAN HYDROBROMIDE AND PROMETHAZINE HYDROCHLORIDE 15; 6.25 MG/5ML; MG/5ML
5 SYRUP ORAL
Qty: 35 ML | Refills: 0 | Status: SHIPPED | OUTPATIENT
Start: 2023-10-24 | End: 2023-10-31

## 2023-10-24 RX ORDER — AZITHROMYCIN 250 MG/1
250 TABLET, FILM COATED ORAL SEE ADMIN INSTRUCTIONS
Qty: 6 TABLET | Refills: 0 | Status: SHIPPED | OUTPATIENT
Start: 2023-10-24 | End: 2023-10-29

## 2023-10-24 RX ORDER — METHYLPREDNISOLONE 4 MG/1
TABLET ORAL
Qty: 1 KIT | Refills: 0 | Status: SHIPPED | OUTPATIENT
Start: 2023-10-24

## 2023-10-24 ASSESSMENT — ENCOUNTER SYMPTOMS: COUGH: 1

## 2023-12-01 ENCOUNTER — OFFICE VISIT (OUTPATIENT)
Dept: PRIMARY CARE | Facility: CLINIC | Age: 68
End: 2023-12-01
Payer: MEDICARE

## 2023-12-01 VITALS
BODY MASS INDEX: 26.34 KG/M2 | OXYGEN SATURATION: 92 % | WEIGHT: 184 LBS | HEART RATE: 87 BPM | DIASTOLIC BLOOD PRESSURE: 74 MMHG | HEIGHT: 70 IN | SYSTOLIC BLOOD PRESSURE: 119 MMHG

## 2023-12-01 DIAGNOSIS — Z71.89 CARDIAC RISK COUNSELING: ICD-10-CM

## 2023-12-01 DIAGNOSIS — J20.9 ACUTE BRONCHITIS, UNSPECIFIED ORGANISM: ICD-10-CM

## 2023-12-01 DIAGNOSIS — D64.9 ANEMIA, NORMOCYTIC NORMOCHROMIC: ICD-10-CM

## 2023-12-01 DIAGNOSIS — Z11.59 ENCOUNTER FOR HEPATITIS C SCREENING TEST FOR LOW RISK PATIENT: ICD-10-CM

## 2023-12-01 DIAGNOSIS — E86.0 DEHYDRATION: ICD-10-CM

## 2023-12-01 DIAGNOSIS — N18.31 STAGE 3A CHRONIC KIDNEY DISEASE (MULTI): ICD-10-CM

## 2023-12-01 DIAGNOSIS — I45.10 RIGHT BUNDLE BRANCH BLOCK (RBBB) DETERMINED BY ELECTROCARDIOGRAPHY: ICD-10-CM

## 2023-12-01 DIAGNOSIS — E55.9 VITAMIN D DEFICIENCY: ICD-10-CM

## 2023-12-01 DIAGNOSIS — R80.9 MICROALBUMINURIA: ICD-10-CM

## 2023-12-01 DIAGNOSIS — E78.00 PURE HYPERCHOLESTEROLEMIA WITH TARGET LOW DENSITY LIPOPROTEIN (LDL) CHOLESTEROL LESS THAN 70 MG/DL: ICD-10-CM

## 2023-12-01 DIAGNOSIS — I10 ESSENTIAL HYPERTENSION: Primary | ICD-10-CM

## 2023-12-01 DIAGNOSIS — E11.65 TYPE 2 DIABETES MELLITUS WITH HYPERGLYCEMIA, WITHOUT LONG-TERM CURRENT USE OF INSULIN (MULTI): ICD-10-CM

## 2023-12-01 PROCEDURE — 1160F RVW MEDS BY RX/DR IN RCRD: CPT | Performed by: INTERNAL MEDICINE

## 2023-12-01 PROCEDURE — 99204 OFFICE O/P NEW MOD 45 MIN: CPT | Performed by: INTERNAL MEDICINE

## 2023-12-01 PROCEDURE — 3078F DIAST BP <80 MM HG: CPT | Performed by: INTERNAL MEDICINE

## 2023-12-01 PROCEDURE — 93000 ELECTROCARDIOGRAM COMPLETE: CPT | Performed by: INTERNAL MEDICINE

## 2023-12-01 PROCEDURE — 1036F TOBACCO NON-USER: CPT | Performed by: INTERNAL MEDICINE

## 2023-12-01 PROCEDURE — 1159F MED LIST DOCD IN RCRD: CPT | Performed by: INTERNAL MEDICINE

## 2023-12-01 PROCEDURE — 3074F SYST BP LT 130 MM HG: CPT | Performed by: INTERNAL MEDICINE

## 2023-12-01 RX ORDER — BISOPROLOL FUMARATE AND HYDROCHLOROTHIAZIDE 5; 6.25 MG/1; MG/1
1 TABLET ORAL DAILY
COMMUNITY
End: 2024-05-24 | Stop reason: SDUPTHER

## 2023-12-01 RX ORDER — DULAGLUTIDE 1.5 MG/.5ML
INJECTION, SOLUTION SUBCUTANEOUS
COMMUNITY
End: 2024-02-07 | Stop reason: SDUPTHER

## 2023-12-01 RX ORDER — SIMVASTATIN 10 MG/1
10 TABLET, FILM COATED ORAL DAILY
COMMUNITY
End: 2024-05-24 | Stop reason: SDUPTHER

## 2023-12-01 RX ORDER — GLIPIZIDE 5 MG/1
1 TABLET ORAL DAILY
COMMUNITY
Start: 2023-06-09 | End: 2024-05-24 | Stop reason: SDUPTHER

## 2023-12-01 NOTE — PATIENT INSTRUCTIONS
Thank you very much for coming.  I am very happy to meet you.  Thank you for taking care of yourself.  You are making my job easier for me!    Please proceed to [facility] anytime that you get a chance to do FASTING laboratory examinations.  I will send word regarding results and possible changes.    I would also like to see you in 6 weeks, so that we can further assess your overall health.  We might even be able to do your Medicare Wellness visit at that point.    You do have ACUTE BRONCHITIS, but fortunately, your lungs remain clear.  Your throat is likewise without any signs of infection.  Still, with low-grade fever, yellow phlegm, you will benefit from ANTIBIOTICS.  Generic Augmentin, AMOXICILLIN/CLAVULANATE 875 mg with breakfast, and again with supper.  Keep your doses 10 to 12 hours apart.  Drink lots of fluids throughout the day.  Get lots of rest and sleep please.  You should be better in 2 or 3 days.    Warm salt water gargles often will get rid of the irritation in your throat.  Honey lemon drops will also help.  Sugar-free please.  Hot tea with some honey will also help.  The active swallowing warm liquids will be very helpful.    Again, thank you very much for coming.  It is a pleasure to meet you.  I reviewed your previous laboratory examinations, and everything seems to be stable, but your numbers need to be updated with a repeat fasting laboratory examination.  Again, I will send word regarding results and possible changes.    Please test yourself for COVID, and if POSITIVE, please call me back, so that I can make adjustments to your treatment regimens.  PLAIN MUCINEX or GUAIFENESIN 1200 mg, take this with breakfast, and again at 4 in the afternoon.  This medication will help you bring up your secretions and clear your secretions more easily.  Again, lots of fluids throughout the day, also will be very helpful.    Labs soon.  Come back in 6 weeks, and it will be time for your Medicare Wellness  evaluation for year 2024, and it will give me a chance to get to know you even better!  Until then, please continue to take care of yourself and your family, and please continue to pray for our recovery from this pandemic.  I hope you have a good [place], and a happy new year!            0  New patient, getting to know him.  Apparently had dispute with insurance, and as such, may have had a lack in workup.  Due for FASTING laboratory examinations soon, for which I will send word regarding any changes.    Plans as above.  Return in 6 weeks.  40 minutes please.  Medicare Wellness evaluation.  Reassess hemodynamics, cardiovascular risk, preventive strategies patient states that he has already been up-to-date with his vaccinations.            0

## 2023-12-01 NOTE — PROGRESS NOTES
Subjective   Patient ID: Zackary Goode is a 68 y.o. male who presents for Establish Care (Pt in today to establish care / c/o cough congestion, cold symptoms ).    HPI   New patient, here for primary care.  States that he had to leave his previous physician because of conflicts between their services and his insurance coverage.  As such, has had some interruption in care, including laboratory laminations not done in over 6 months.  In the meantime, remaining compliant with medications, tolerating regimens, and has been taking care of self, eating sensibly, staying physically active.      Perhaps his chief complaint is chest congestion over the past 2 days.  He does not recall being exposed to anybody else who might have been sick, and he lives alone.  He does not smoke.  Chest congestion, coughing over the past 2 days, finally producing yellowish phlegm rather consistently.  Also noted to have fever 99.4 °F, chills, but otherwise, no wheezing, and no particular dyspnea on exertion.  Has not checked himself for COVID.  Had vaccinations done October 2, 2023, influenza, COVID-19 booster, and was wondering if these may have contributed, for which the patient was assured that this most likely is independent of his previous vaccinations, and in fact, he probably is having a milder illness compared to if he were not vaccinated.  CONSTITUTIONALLY, no fever, no chills.  No night sweats.  No lingering anorexia or nausea.  No apparent lymphadenopathy.  No apparent weight loss.    Otherwise, no sonia chest pain.  No orthopnea, no paroxysmal nocturnal dyspnea.  No dizziness, diaphoresis, palpitations.  No loss of consciousness.  No bipedal edema.  No remarkable dyspnea on exertion.  No headache, blurred vision, diplopia.  No dysphagia.  No focal weakness, ataxia, clumsiness.  No falls.  No paresthesia.  No confusion or delirium, with patient not worried about memory.  Not depressive or suicidal, with patient not wishing  "harm to self or others.      Noted to have azotemia, but no lingering anorexia or nausea, no history of irritability or lethargy, no history of confusion or delirium.  No change in urine character or habits.  No symptoms referable to uremia.  Has not needed to see nephrology in the past.  Does have a history of DIABETES mellitus type 2, but again, compliant with medications, tolerating regimens, and eating sensibly, physically active.  ENDOCRINE with no polyuria, polydipsia, polyphagia.  No blurred vision.  No skin, hair, nail changes.  No dramatic weight loss or weight gain.      Likewise, noted to have ANEMIA in the past, but no history of dyspepsia.  No gum or nose bleeding.  No easy bruisability.  No apparent blood in urine or stool.  Appetite preserved, no abdominal distress.  No dysuria, flank, suprapubic pain.  No skin changes, rashes, pruritus, jaundice.        Review of Systems  Review of systems as in history of present illness, and otherwise, reviewed separately as well, and was unremarkable/negative/noncontributory.          Objective   /74 (BP Location: Left arm, Patient Position: Sitting)   Pulse 87   Ht 1.778 m (5' 10\")   Wt 83.5 kg (184 lb)   SpO2 92%   BMI 26.40 kg/m²     Physical Exam  Moderately built male.  In good spirits.  Not in distress or diaphoresis.  Alert, oriented x 3.  Amiable.  Not unkempt.  Receptive, cheerful, appropriate.  Eager to improve quality of life and recover from what appears to be acute bronchitis.  Not wishing harm to self or others.    HEAD pink palpebral conjunctivae, anicteric sclerae.  No sinus or tragal tenderness.  Tympanic membranes obscured by cerumen, right, intact, left.  Mucous membranes somewhat dry.  No tonsillopharyngeal congestion, no thrush.    NECK supple, no apparent jugular venous distention.  No lymphadenopathy.  No carotid bruit.  CARDIOVASCULAR not in distress or diaphoresis.  No bipedal edema.  Regular rate and rhythm.  No murmurs " appreciated.  LUNGS not in distress or diaphoresis.  Not using accessory muscles.  Clear to auscultation bilaterally.  ABDOMEN soft, nontender.  BACK no costovertebral angle tenderness.  EXTREMITIES no clubbing, no cyanosis.  SKIN with no breakdown, bleeding, jaundice.  NEURO no facial asymmetry.  No apparent cranial nerve deficits.  Romberg negative.  Ambulating without need of assistance.  No apparent focal weakness.  No tremors.  PSYCH receptive, appropriate, and eager to maintain and improve quality of life.      Previous LABORATORY examinations noted from 2020 onward.  Last workup done in March 2023, BMP.      Assessment/Plan   Diagnoses and all orders for this visit:  Essential hypertension  -     ECG 12 lead (Clinic Performed)  -     Follow Up In Primary Care - Established; Future  -     CBC and Auto Differential; Future  -     Comprehensive Metabolic Panel; Future  -     TSH with reflex to Free T4 if abnormal; Future  -     Magnesium; Future  -     Urinalysis with Reflex Microscopic and Culture; Future  Pure hypercholesterolemia with target low density lipoprotein (LDL) cholesterol less than 70 mg/dL  -     ECG 12 lead (Clinic Performed)  -     Follow Up In Primary Care - Established; Future  -     Comprehensive Metabolic Panel; Future  -     Lipid Panel; Future  Type 2 diabetes mellitus with hyperglycemia, without long-term current use of insulin (CMS/HCC)  -     ECG 12 lead (Clinic Performed)  -     Follow Up In Primary Care - Established; Future  -     Comprehensive Metabolic Panel; Future  -     Urinalysis with Reflex Microscopic and Culture; Future  -     Hemoglobin A1C; Future  Cardiac risk counseling  -     ECG 12 lead (Clinic Performed)  -     Follow Up In Primary Care - Established; Future  -     Comprehensive Metabolic Panel; Future  -     Lipid Panel; Future  -     Hemoglobin A1C; Future  Stage 3a chronic kidney disease (CMS/HCC)  -     Follow Up In Primary Care - Established; Future  -      Comprehensive Metabolic Panel; Future  -     Urinalysis with Reflex Microscopic and Culture; Future  Anemia, normocytic normochromic  -     Follow Up In Primary Care - Established; Future  -     CBC and Auto Differential; Future  -     Urinalysis with Reflex Microscopic and Culture; Future  -     Vitamin B12; Future  -     Folate; Future  -     Ferritin; Future  -     Iron and TIBC; Future  Microalbuminuria  -     Follow Up In Primary Care - Established; Future  -     Comprehensive Metabolic Panel; Future  -     Urinalysis with Reflex Microscopic and Culture; Future  Encounter for hepatitis C screening test for low risk patient  -     Comprehensive Metabolic Panel; Future  -     Hepatitis C Antibody; Future  Acute bronchitis, unspecified organism  -     Follow Up In Primary Care - Established; Future  -     CBC and Auto Differential; Future  Dehydration  -     Comprehensive Metabolic Panel; Future  Vitamin D deficiency  -     Follow Up In Primary Care - Established; Future  -     Vitamin D 25-Hydroxy,Total (for eval of Vitamin D levels); Future  Right bundle branch block (RBBB) determined by electrocardiography  -     Follow Up In Primary Care - Established; Future  -     Comprehensive Metabolic Panel; Future  -     TSH with reflex to Free T4 if abnormal; Future  -     Magnesium; Future       Thank you very much for coming.  I am very happy to meet you.  Thank you for taking care of yourself.  You are making my job easier for me!    Please proceed to [facility] anytime that you get a chance to do FASTING laboratory examinations.  I will send word regarding results and possible changes.    I would also like to see you in 6 weeks, so that we can further assess your overall health.  We might even be able to do your Medicare Wellness visit at that point.    You do have ACUTE BRONCHITIS, but fortunately, your lungs remain clear.  Your throat is likewise without any signs of infection.  Still, with low-grade fever,  yellow phlegm, you will benefit from ANTIBIOTICS.  Generic Augmentin, AMOXICILLIN/CLAVULANATE 875 mg with breakfast, and again with supper.  Keep your doses 10 to 12 hours apart.  Drink lots of fluids throughout the day.  Get lots of rest and sleep please.  You should be better in 2 or 3 days.    Warm salt water gargles often will get rid of the irritation in your throat.  Honey lemon drops will also help.  Sugar-free please.  Hot tea with some honey will also help.  The active swallowing warm liquids will be very helpful.    Again, thank you very much for coming.  It is a pleasure to meet you.  I reviewed your previous laboratory examinations, and everything seems to be stable, but your numbers need to be updated with a repeat fasting laboratory examination.  Again, I will send word regarding results and possible changes.    Please test yourself for COVID, and if POSITIVE, please call me back, so that I can make adjustments to your treatment regimens.  PLAIN MUCINEX or GUAIFENESIN 1200 mg, take this with breakfast, and again at 4 in the afternoon.  This medication will help you bring up your secretions and clear your secretions more easily.  Again, lots of fluids throughout the day, also will be very helpful.    Labs soon.  Come back in 6 weeks, and it will be time for your Medicare Wellness evaluation for year 2024, and it will give me a chance to get to know you even better!  Until then, please continue to take care of yourself and your family, and please continue to pray for our recovery from this pandemic.  I hope you have a good [place], and a happy new year!            0  New patient, getting to know him.  Apparently had dispute with insurance, and as such, may have had a lack in workup.  Due for FASTING laboratory examinations soon, for which I will send word regarding any changes.    Plans as above.  Return in 6 weeks.  40 minutes please.  Medicare Wellness evaluation.  Reassess hemodynamics,  cardiovascular risk, preventive strategies patient states that he has already been up-to-date with his vaccinations.            0

## 2023-12-04 ENCOUNTER — TELEPHONE (OUTPATIENT)
Dept: PRIMARY CARE | Facility: CLINIC | Age: 68
End: 2023-12-04
Payer: MEDICARE

## 2023-12-04 DIAGNOSIS — J20.9 ACUTE BRONCHITIS, UNSPECIFIED ORGANISM: Primary | ICD-10-CM

## 2023-12-04 RX ORDER — AMOXICILLIN AND CLAVULANATE POTASSIUM 875; 125 MG/1; MG/1
875 TABLET, FILM COATED ORAL 2 TIMES DAILY
Qty: 20 TABLET | Refills: 0 | Status: SHIPPED | OUTPATIENT
Start: 2023-12-04 | End: 2023-12-14

## 2023-12-04 NOTE — TELEPHONE ENCOUNTER
Pt states he was supposed to receive antibiotic, says its not at pharmacy.  Also Pt did test positive for Covid Saturday.  Please advise.

## 2023-12-05 ENCOUNTER — LAB (OUTPATIENT)
Dept: LAB | Facility: LAB | Age: 68
End: 2023-12-05
Payer: MEDICARE

## 2023-12-05 DIAGNOSIS — Z71.89 CARDIAC RISK COUNSELING: ICD-10-CM

## 2023-12-05 DIAGNOSIS — E86.0 DEHYDRATION: ICD-10-CM

## 2023-12-05 DIAGNOSIS — N18.31 STAGE 3A CHRONIC KIDNEY DISEASE (MULTI): ICD-10-CM

## 2023-12-05 DIAGNOSIS — E78.00 PURE HYPERCHOLESTEROLEMIA WITH TARGET LOW DENSITY LIPOPROTEIN (LDL) CHOLESTEROL LESS THAN 70 MG/DL: ICD-10-CM

## 2023-12-05 DIAGNOSIS — J20.9 ACUTE BRONCHITIS, UNSPECIFIED ORGANISM: ICD-10-CM

## 2023-12-05 DIAGNOSIS — I10 ESSENTIAL HYPERTENSION: ICD-10-CM

## 2023-12-05 DIAGNOSIS — D64.9 ANEMIA, NORMOCYTIC NORMOCHROMIC: ICD-10-CM

## 2023-12-05 DIAGNOSIS — I45.10 RIGHT BUNDLE BRANCH BLOCK (RBBB) DETERMINED BY ELECTROCARDIOGRAPHY: ICD-10-CM

## 2023-12-05 DIAGNOSIS — R80.9 MICROALBUMINURIA: ICD-10-CM

## 2023-12-05 DIAGNOSIS — Z11.59 ENCOUNTER FOR HEPATITIS C SCREENING TEST FOR LOW RISK PATIENT: ICD-10-CM

## 2023-12-05 DIAGNOSIS — E55.9 VITAMIN D DEFICIENCY: ICD-10-CM

## 2023-12-05 DIAGNOSIS — E11.65 TYPE 2 DIABETES MELLITUS WITH HYPERGLYCEMIA, WITHOUT LONG-TERM CURRENT USE OF INSULIN (MULTI): ICD-10-CM

## 2023-12-05 LAB
25(OH)D3 SERPL-MCNC: 27 NG/ML (ref 30–100)
ALBUMIN SERPL BCP-MCNC: 4.1 G/DL (ref 3.4–5)
ALP SERPL-CCNC: 46 U/L (ref 33–136)
ALT SERPL W P-5'-P-CCNC: 22 U/L (ref 10–52)
ANION GAP SERPL CALC-SCNC: 15 MMOL/L (ref 10–20)
APPEARANCE UR: CLEAR
AST SERPL W P-5'-P-CCNC: 25 U/L (ref 9–39)
BASOPHILS # BLD AUTO: 0.01 X10*3/UL (ref 0–0.1)
BASOPHILS NFR BLD AUTO: 0.3 %
BILIRUB SERPL-MCNC: 0.5 MG/DL (ref 0–1.2)
BILIRUB UR STRIP.AUTO-MCNC: NEGATIVE MG/DL
BUN SERPL-MCNC: 19 MG/DL (ref 6–23)
CALCIUM SERPL-MCNC: 9.2 MG/DL (ref 8.6–10.3)
CHLORIDE SERPL-SCNC: 102 MMOL/L (ref 98–107)
CHOLEST SERPL-MCNC: 164 MG/DL (ref 0–199)
CHOLESTEROL/HDL RATIO: 3.7
CO2 SERPL-SCNC: 29 MMOL/L (ref 21–32)
COLOR UR: YELLOW
CREAT SERPL-MCNC: 1.28 MG/DL (ref 0.5–1.3)
EOSINOPHIL # BLD AUTO: 0.06 X10*3/UL (ref 0–0.7)
EOSINOPHIL NFR BLD AUTO: 1.5 %
ERYTHROCYTE [DISTWIDTH] IN BLOOD BY AUTOMATED COUNT: 12.8 % (ref 11.5–14.5)
EST. AVERAGE GLUCOSE BLD GHB EST-MCNC: 174 MG/DL
FERRITIN SERPL-MCNC: 164 NG/ML (ref 20–300)
FOLATE SERPL-MCNC: >22.3 NG/ML
GFR SERPL CREATININE-BSD FRML MDRD: 60 ML/MIN/1.73M*2
GLUCOSE SERPL-MCNC: 154 MG/DL (ref 74–99)
GLUCOSE UR STRIP.AUTO-MCNC: NEGATIVE MG/DL
HBA1C MFR BLD: 7.7 %
HCT VFR BLD AUTO: 41.8 % (ref 41–52)
HCV AB SER QL: NONREACTIVE
HDLC SERPL-MCNC: 44.5 MG/DL
HGB BLD-MCNC: 13.7 G/DL (ref 13.5–17.5)
HOLD SPECIMEN: NORMAL
IMM GRANULOCYTES # BLD AUTO: 0.01 X10*3/UL (ref 0–0.7)
IMM GRANULOCYTES NFR BLD AUTO: 0.3 % (ref 0–0.9)
IRON SATN MFR SERPL: 34 % (ref 25–45)
IRON SERPL-MCNC: 123 UG/DL (ref 35–150)
KETONES UR STRIP.AUTO-MCNC: NEGATIVE MG/DL
LDLC SERPL CALC-MCNC: 92 MG/DL
LEUKOCYTE ESTERASE UR QL STRIP.AUTO: NEGATIVE
LYMPHOCYTES # BLD AUTO: 1.14 X10*3/UL (ref 1.2–4.8)
LYMPHOCYTES NFR BLD AUTO: 28.8 %
MAGNESIUM SERPL-MCNC: 1.76 MG/DL (ref 1.6–2.4)
MCH RBC QN AUTO: 30.5 PG (ref 26–34)
MCHC RBC AUTO-ENTMCNC: 32.8 G/DL (ref 32–36)
MCV RBC AUTO: 93 FL (ref 80–100)
MONOCYTES # BLD AUTO: 0.34 X10*3/UL (ref 0.1–1)
MONOCYTES NFR BLD AUTO: 8.6 %
MUCOUS THREADS #/AREA URNS AUTO: NORMAL /LPF
NEUTROPHILS # BLD AUTO: 2.4 X10*3/UL (ref 1.2–7.7)
NEUTROPHILS NFR BLD AUTO: 60.5 %
NITRITE UR QL STRIP.AUTO: NEGATIVE
NON HDL CHOLESTEROL: 120 MG/DL (ref 0–149)
NRBC BLD-RTO: 0 /100 WBCS (ref 0–0)
PH UR STRIP.AUTO: 5 [PH]
PLATELET # BLD AUTO: 192 X10*3/UL (ref 150–450)
POTASSIUM SERPL-SCNC: 3.9 MMOL/L (ref 3.5–5.3)
PROT SERPL-MCNC: 6.9 G/DL (ref 6.4–8.2)
PROT UR STRIP.AUTO-MCNC: ABNORMAL MG/DL
RBC # BLD AUTO: 4.49 X10*6/UL (ref 4.5–5.9)
RBC # UR STRIP.AUTO: NEGATIVE /UL
RBC #/AREA URNS AUTO: NORMAL /HPF
SODIUM SERPL-SCNC: 142 MMOL/L (ref 136–145)
SP GR UR STRIP.AUTO: 1.02
TIBC SERPL-MCNC: 363 UG/DL (ref 240–445)
TRIGL SERPL-MCNC: 139 MG/DL (ref 0–149)
TSH SERPL-ACNC: 1.25 MIU/L (ref 0.44–3.98)
UIBC SERPL-MCNC: 240 UG/DL (ref 110–370)
UROBILINOGEN UR STRIP.AUTO-MCNC: <2 MG/DL
VIT B12 SERPL-MCNC: 195 PG/ML (ref 211–911)
VLDL: 28 MG/DL (ref 0–40)
WBC # BLD AUTO: 4 X10*3/UL (ref 4.4–11.3)
WBC #/AREA URNS AUTO: NORMAL /HPF

## 2023-12-05 PROCEDURE — 83550 IRON BINDING TEST: CPT

## 2023-12-05 PROCEDURE — 82306 VITAMIN D 25 HYDROXY: CPT

## 2023-12-05 PROCEDURE — 80061 LIPID PANEL: CPT

## 2023-12-05 PROCEDURE — 82607 VITAMIN B-12: CPT

## 2023-12-05 PROCEDURE — 81001 URINALYSIS AUTO W/SCOPE: CPT

## 2023-12-05 PROCEDURE — 82746 ASSAY OF FOLIC ACID SERUM: CPT

## 2023-12-05 PROCEDURE — 86803 HEPATITIS C AB TEST: CPT

## 2023-12-05 PROCEDURE — 85025 COMPLETE CBC W/AUTO DIFF WBC: CPT

## 2023-12-05 PROCEDURE — 83735 ASSAY OF MAGNESIUM: CPT

## 2023-12-05 PROCEDURE — 36415 COLL VENOUS BLD VENIPUNCTURE: CPT

## 2023-12-05 PROCEDURE — 83036 HEMOGLOBIN GLYCOSYLATED A1C: CPT

## 2023-12-05 PROCEDURE — 84443 ASSAY THYROID STIM HORMONE: CPT

## 2023-12-05 PROCEDURE — 82728 ASSAY OF FERRITIN: CPT

## 2023-12-05 PROCEDURE — 83540 ASSAY OF IRON: CPT

## 2023-12-05 PROCEDURE — 80053 COMPREHEN METABOLIC PANEL: CPT

## 2024-01-12 ENCOUNTER — OFFICE VISIT (OUTPATIENT)
Dept: PRIMARY CARE | Facility: CLINIC | Age: 69
End: 2024-01-12
Payer: MEDICARE

## 2024-01-12 VITALS
RESPIRATION RATE: 18 BRPM | WEIGHT: 187 LBS | DIASTOLIC BLOOD PRESSURE: 82 MMHG | SYSTOLIC BLOOD PRESSURE: 133 MMHG | OXYGEN SATURATION: 95 % | HEART RATE: 77 BPM | BODY MASS INDEX: 26.77 KG/M2 | HEIGHT: 70 IN

## 2024-01-12 DIAGNOSIS — Z00.00 ROUTINE GENERAL MEDICAL EXAMINATION AT HEALTH CARE FACILITY: Primary | ICD-10-CM

## 2024-01-12 DIAGNOSIS — D51.9 ANEMIA DUE TO VITAMIN B12 DEFICIENCY, UNSPECIFIED B12 DEFICIENCY TYPE: ICD-10-CM

## 2024-01-12 DIAGNOSIS — E55.9 VITAMIN D DEFICIENCY: ICD-10-CM

## 2024-01-12 DIAGNOSIS — R80.9 MICROALBUMINURIA: ICD-10-CM

## 2024-01-12 DIAGNOSIS — I10 ESSENTIAL HYPERTENSION: ICD-10-CM

## 2024-01-12 DIAGNOSIS — E78.00 PURE HYPERCHOLESTEROLEMIA WITH TARGET LOW DENSITY LIPOPROTEIN (LDL) CHOLESTEROL LESS THAN 70 MG/DL: ICD-10-CM

## 2024-01-12 DIAGNOSIS — N18.31 STAGE 3A CHRONIC KIDNEY DISEASE (MULTI): ICD-10-CM

## 2024-01-12 DIAGNOSIS — E11.65 TYPE 2 DIABETES MELLITUS WITH HYPERGLYCEMIA, WITHOUT LONG-TERM CURRENT USE OF INSULIN (MULTI): ICD-10-CM

## 2024-01-12 DIAGNOSIS — Z91.89 FRAMINGHAM CARDIAC RISK >20% IN NEXT 10 YEARS: ICD-10-CM

## 2024-01-12 DIAGNOSIS — D72.819 LEUKOPENIA, UNSPECIFIED TYPE: ICD-10-CM

## 2024-01-12 PROCEDURE — 3079F DIAST BP 80-89 MM HG: CPT | Performed by: INTERNAL MEDICINE

## 2024-01-12 PROCEDURE — 1123F ACP DISCUSS/DSCN MKR DOCD: CPT | Performed by: INTERNAL MEDICINE

## 2024-01-12 PROCEDURE — 3075F SYST BP GE 130 - 139MM HG: CPT | Performed by: INTERNAL MEDICINE

## 2024-01-12 PROCEDURE — 1159F MED LIST DOCD IN RCRD: CPT | Performed by: INTERNAL MEDICINE

## 2024-01-12 PROCEDURE — 1036F TOBACCO NON-USER: CPT | Performed by: INTERNAL MEDICINE

## 2024-01-12 PROCEDURE — 96372 THER/PROPH/DIAG INJ SC/IM: CPT | Performed by: INTERNAL MEDICINE

## 2024-01-12 PROCEDURE — 1170F FXNL STATUS ASSESSED: CPT | Performed by: INTERNAL MEDICINE

## 2024-01-12 PROCEDURE — G0439 PPPS, SUBSEQ VISIT: HCPCS | Performed by: INTERNAL MEDICINE

## 2024-01-12 PROCEDURE — 99213 OFFICE O/P EST LOW 20 MIN: CPT | Performed by: INTERNAL MEDICINE

## 2024-01-12 RX ORDER — CYANOCOBALAMIN 1000 UG/ML
1000 INJECTION, SOLUTION INTRAMUSCULAR; SUBCUTANEOUS ONCE
Status: COMPLETED | OUTPATIENT
Start: 2024-01-12 | End: 2024-01-12

## 2024-01-12 RX ORDER — LANOLIN ALCOHOL/MO/W.PET/CERES
CREAM (GRAM) TOPICAL
Qty: 180 TABLET | Refills: 3 | Status: SHIPPED | OUTPATIENT
Start: 2024-01-12

## 2024-01-12 RX ADMIN — CYANOCOBALAMIN 1000 MCG: 1000 INJECTION, SOLUTION INTRAMUSCULAR; SUBCUTANEOUS at 11:29

## 2024-01-12 ASSESSMENT — PATIENT HEALTH QUESTIONNAIRE - PHQ9
SUM OF ALL RESPONSES TO PHQ9 QUESTIONS 1 AND 2: 0
2. FEELING DOWN, DEPRESSED OR HOPELESS: NOT AT ALL
1. LITTLE INTEREST OR PLEASURE IN DOING THINGS: NOT AT ALL

## 2024-01-12 ASSESSMENT — ENCOUNTER SYMPTOMS
DEPRESSION: 0
LOSS OF SENSATION IN FEET: 0
OCCASIONAL FEELINGS OF UNSTEADINESS: 0

## 2024-01-12 ASSESSMENT — ACTIVITIES OF DAILY LIVING (ADL)
DOING_HOUSEWORK: INDEPENDENT
GROCERY_SHOPPING: INDEPENDENT
DRESSING: INDEPENDENT
TAKING_MEDICATION: INDEPENDENT
BATHING: INDEPENDENT
MANAGING_FINANCES: INDEPENDENT

## 2024-01-12 NOTE — PROGRESS NOTES
"Subjective   Reason for Visit: Zackary Goode is an 68 y.o. male here for a Medicare Wellness visit.     Past Medical, Surgical, and Family History reviewed and updated in chart.    Reviewed all medications by prescribing practitioner or clinical pharmacist (such as prescriptions, OTCs, herbal therapies and supplements) and documented in the medical record.    HPI  The patient is here for review of hemodynamics, cardiovascular risk, and to allow me to get to know him better.  He is also in time for his Medicare Wellness evaluation for the year.    Compliant with medications, tolerating regimens.  Physically active.  Following with CARDIOLOGY, and remains hemodynamically asymptomatic.  No sonia substernal chest pain, no orthopnea, no paroxysmal nocturnal dyspnea.  No particular cough, no particular sputum production.  No headache, blurred vision, diplopia.  No dysphagia.  Continues to want to stay healthy, independent, and productive, and does not wish harm to self or others.  CONSTITUTIONALLY, no fever, no chills.  No night sweats.  No lingering anorexia or nausea.  No apparent lymphadenopathy.  No apparent weight loss.    States that he had a colonoscopy which was \"clean,\" and that he does not need a colonoscopy again for 10 years.  States that he has the report, and would like to bring it for me to review.  Appetite preserved, no abdominal distress.  No dysuria, flank, suprapubic pain.  No skin changes, rashes, pruritus, jaundice.  No easy bruisability.  ENDOCRINE with no polyuria, polydipsia, polyphagia.  No blurred vision.  No skin, hair, nail changes.  No dramatic weight loss or weight gain.      The patient states that he exercises regularly, eats sensibly, and has had no problems with falling.  Does not think that he is depressive.  CODE STATUS and living will wishes reviewed, with the patient adding his COUSIN as his DURABLE POWER OF , because his aunt is now in her 90s.  His CODE STATUS is FULL " "CODE.    Patient Care Team:  Ap Grover MD as PCP - General (Internal Medicine)  Ap Grover MD as PCP - [employer] PCP         Review of Systems  Review of systems as in history of present illness, and otherwise, reviewed separately as well, and was unremarkable/negative/noncontributory.        Objective   Vitals:  /82 (BP Location: Left arm, Patient Position: Sitting, BP Cuff Size: Adult)   Pulse 77   Resp 18   Ht 1.778 m (5' 10\")   Wt 84.8 kg (187 lb)   SpO2 95%   BMI 26.83 kg/m²       Physical Exam  In very good spirits.  Not in distress or diaphoresis.  Alert, oriented x 3.  Amiable.  Not unkempt.  Receptive, cheerful, appropriate.  Eager to stay healthy, independent, and productive, and does not wish harm to self or others.    HEAD pink palpebral conjunctivae, anicteric sclerae.  NECK supple, no apparent jugular venous distention.  CARDIOVASCULAR not in distress or diaphoresis.  No bipedal edema.  LUNGS not in distress or diaphoresis.  Not using accessory muscles.  ABDOMEN soft, nontender.  BACK no costovertebral angle tenderness.  EXTREMITIES no clubbing, no cyanosis.  NEURO no facial asymmetry.  No apparent cranial nerve deficits.  Romberg negative.  Ambulating without need of assistance.  No apparent focal weakness.  No tremors.  PSYCH receptive, appropriate, and eager to maintain and improve quality of life.      LABORATORY results reviewed with patient.      Assessment/Plan   Zackary was seen today for medicare annual wellness visit initial.  Diagnoses and all orders for this visit:  Routine general medical examination at health care facility (Primary)  Essential hypertension  -     Follow Up In Primary Care - Established  -     Follow Up In Primary Care - Established; Future  -     Comprehensive Metabolic Panel; Future  -     CBC and Auto Differential; Future  Pure hypercholesterolemia with target low density lipoprotein (LDL) cholesterol less than 70 " mg/dL  -     Follow Up In Primary Care - Established; Future  -     Comprehensive Metabolic Panel; Future  Type 2 diabetes mellitus with hyperglycemia, without long-term current use of insulin (CMS/[facility])  -     Referral to Podiatry; Future  -     Follow Up In Primary Care - Established; Future  -     Albumin , Urine Random; Future  -     Comprehensive Metabolic Panel; Future  -     Hemoglobin A1C; Future  [place] cardiac risk >20% in next 10 years  Comments:  36.9% 01/2024  Orders:  -     Follow Up In Primary Care - Established; Future  -     Comprehensive Metabolic Panel; Future  -     Hemoglobin A1C; Future  Stage 3a chronic kidney disease (CMS/[facility])  -     Follow Up In Primary Care - Established; Future  -     Albumin , Urine Random; Future  -     Comprehensive Metabolic Panel; Future  -     CBC and Auto Differential; Future  Microalbuminuria  -     Follow Up In Primary Care - Established; Future  -     Albumin , Urine Random; Future  -     Comprehensive Metabolic Panel; Future  Anemia due to vitamin B12 deficiency, unspecified B12 deficiency type  -     Follow Up In Primary Care - Established; Future  -     Hemoglobin A1C; Future  -     Vitamin B12; Future  Vitamin D deficiency  -     cyanocobalamin (Vitamin B-12) injection 1,000 mcg  -     cyanocobalamin (Vitamin B-12) 1,000 mcg tablet; Please take 1 tablet by mouth with breakfast, and again 1 tablet by mouth with lunch.  Do not take with supper or it may cause insomnia.  Thank you.  -     Follow Up In Primary Care - Established; Future  -     Comprehensive Metabolic Panel; Future  Leukopenia, unspecified type  -     Follow Up In Primary Care - Established; Future  -     CBC and Auto Differential; Future           Thank you very much for coming.  I am very happy to see you.    We did your Medicare Wellness evaluation today, and you seem to be doing very well!  Thank you very much for taking care of yourself.    Please continue eating sensibly.  Please continue  staying physically active.  Please continue drinking lots of fluids and avoiding salt.  This is especially important because your kidneys are very sensitive.    We reviewed your LIVING WILL and CODE STATUS wishes.  We have updated your  to be your COUSIN on, TUAN.  He will be your DURABLE POWER OF  for healthcare matters.  This means that if anything happens, we will talk to him in the event that you are unable to make decisions for yourself.    Your CODE STATUS is FULL CODE, meaning that we will do everything possible to save your life and to preserve it.  After you are stabilized, we will discuss with you and your power of  what your overall quality of life is concerned, and what your options are.    I am glad that you started taking VITAMIN D.  Please let me know the formulation, so that I can update your chart.    I would recommend that you take your VITAMIN B12 1000 mcg tablet twice a day, with breakfast, and again with lunch, so that you can replenish your vitamin B12 stores.  Do not take vitamin B12 with supper, because it can cause INSOMNIA.    I am glad to hear that you saw your CARDIOLOGIST, and that he was happy with how you are doing.    I would like a copy of your COLONOSCOPY, so that we can update your chart.    You will benefit from seeing a FOOT SPECIALIST, especially with your history of diabetes.    Again, thank you very much for coming.  Thank you for letting me get to know you.  Please come back in 3 months.  Repeat some FASTING laboratory examinations to recheck how you are kidneys, liver, cholesterol, blood sugar are all doing.  See me soon after, so that we can determine what else can be done to improve your overall quality of life!    Until then, please continue to take care of yourself and your family, and please continue to pray for our recovery from this pandemic.  I hope you enjoyed your [place], and I do wish you a happy new year!            0  Return  in 3 months.  40 minutes please.  Repeat FASTING laboratory examination, then see me for COMPLETE physical examination.  Coordinate with cardiology.  Reassess hemodynamics, cardiovascular risk.  Reassess compliance, tolerance, blood sugar control.  Reassess mood, energy, function.            0

## 2024-01-12 NOTE — PATIENT INSTRUCTIONS
Thank you very much for coming.  I am very happy to see you.    We did your Medicare Wellness evaluation today, and you seem to be doing very well!  Thank you very much for taking care of yourself.    Please continue eating sensibly.  Please continue staying physically active.  Please continue drinking lots of fluids and avoiding salt.  This is especially important because your kidneys are very sensitive.    We reviewed your LIVING WILL and CODE STATUS wishes.  We have updated your  to be your COUSIN on, TUAN.  He will be your DURABLE POWER OF  for healthcare matters.  This means that if anything happens, we will talk to him in the event that you are unable to make decisions for yourself.    Your CODE STATUS is FULL CODE, meaning that we will do everything possible to save your life and to preserve it.  After you are stabilized, we will discuss with you and your power of  what your overall quality of life is concerned, and what your options are.    I am glad that you started taking VITAMIN D.  Please let me know the formulation, so that I can update your chart.    I would recommend that you take your VITAMIN B12 1000 mcg tablet twice a day, with breakfast, and again with lunch, so that you can replenish your vitamin B12 stores.  Do not take vitamin B12 with supper, because it can cause INSOMNIA.    I am glad to hear that you saw your CARDIOLOGIST, and that he was happy with how you are doing.    I would like a copy of your COLONOSCOPY, so that we can update your chart.    You will benefit from seeing a FOOT SPECIALIST, especially with your history of diabetes.    Again, thank you very much for coming.  Thank you for letting me get to know you.  Please come back in 3 months.  Repeat some FASTING laboratory examinations to recheck how you are kidneys, liver, cholesterol, blood sugar are all doing.  See me soon after, so that we can determine what else can be done to improve your  overall quality of life!    Until then, please continue to take care of yourself and your family, and please continue to pray for our recovery from this pandemic.  I hope you enjoyed your [place], and I do wish you a happy new year!            0  Return in 3 months.  40 minutes please.  Repeat FASTING laboratory examination, then see me for COMPLETE physical examination.  Coordinate with cardiology.  Reassess hemodynamics, cardiovascular risk.  Reassess compliance, tolerance, blood sugar control.  Reassess mood, energy, function.            0

## 2024-01-17 ENCOUNTER — TELEPHONE (OUTPATIENT)
Dept: PRIMARY CARE | Facility: CLINIC | Age: 69
End: 2024-01-17
Payer: MEDICARE

## 2024-01-18 ENCOUNTER — APPOINTMENT (OUTPATIENT)
Dept: PRIMARY CARE | Facility: CLINIC | Age: 69
End: 2024-01-18
Payer: MEDICARE

## 2024-02-07 DIAGNOSIS — E11.65 TYPE 2 DIABETES MELLITUS WITH HYPERGLYCEMIA, WITHOUT LONG-TERM CURRENT USE OF INSULIN (MULTI): Primary | ICD-10-CM

## 2024-02-07 RX ORDER — DULAGLUTIDE 1.5 MG/.5ML
INJECTION, SOLUTION SUBCUTANEOUS
Qty: 2 ML | Refills: 1 | Status: SHIPPED | OUTPATIENT
Start: 2024-02-07

## 2024-02-21 RX ORDER — DULAGLUTIDE 0.75 MG/.5ML
0.75 INJECTION, SOLUTION SUBCUTANEOUS WEEKLY
Qty: 5 ML | Refills: 5 | Status: SHIPPED | OUTPATIENT
Start: 2024-02-21

## 2024-02-21 NOTE — TELEPHONE ENCOUNTER
Pharmacy requesting medication refill. Please approve or deny this request.    Rx requested:  Requested Prescriptions     Pending Prescriptions Disp Refills    TRULICITY 0.75 MG/0.5ML SOPN [Pharmacy Med Name: TRULICITY 0.75 MG/0.5 ML PEN] 5 mL 5     Sig: INJECT 0.75 MG INTO THE SKIN ONCE A WEEK         Last Office Visit:   6/9/2023      Next Visit Date:  No future appointments.    PATIENT NEEDS A FOLLOW UP APPOINTMENT FOR HIS MEDICATION REFILL.

## 2024-03-01 ENCOUNTER — TELEPHONE (OUTPATIENT)
Dept: FAMILY MEDICINE CLINIC | Age: 69
End: 2024-03-01

## 2024-03-01 NOTE — TELEPHONE ENCOUNTER
I called patient to schedule an appt he stated he has a new pcp due to his insurance change. Patient provided new provider's name and has been updated

## 2024-04-18 ENCOUNTER — APPOINTMENT (OUTPATIENT)
Dept: PRIMARY CARE | Facility: CLINIC | Age: 69
End: 2024-04-18
Payer: MEDICARE

## 2024-05-15 RX ORDER — DULAGLUTIDE 0.75 MG/.5ML
INJECTION, SOLUTION SUBCUTANEOUS
Refills: 1 | OUTPATIENT
Start: 2024-05-15

## 2024-05-20 ENCOUNTER — LAB (OUTPATIENT)
Dept: LAB | Facility: LAB | Age: 69
End: 2024-05-20
Payer: MEDICARE

## 2024-05-20 DIAGNOSIS — R80.9 MICROALBUMINURIA: ICD-10-CM

## 2024-05-20 DIAGNOSIS — Z91.89 FRAMINGHAM CARDIAC RISK >20% IN NEXT 10 YEARS: ICD-10-CM

## 2024-05-20 DIAGNOSIS — I10 ESSENTIAL HYPERTENSION: ICD-10-CM

## 2024-05-20 DIAGNOSIS — E78.00 PURE HYPERCHOLESTEROLEMIA WITH TARGET LOW DENSITY LIPOPROTEIN (LDL) CHOLESTEROL LESS THAN 70 MG/DL: ICD-10-CM

## 2024-05-20 DIAGNOSIS — N18.31 STAGE 3A CHRONIC KIDNEY DISEASE (MULTI): ICD-10-CM

## 2024-05-20 DIAGNOSIS — D72.819 LEUKOPENIA, UNSPECIFIED TYPE: ICD-10-CM

## 2024-05-20 DIAGNOSIS — E11.65 TYPE 2 DIABETES MELLITUS WITH HYPERGLYCEMIA, WITHOUT LONG-TERM CURRENT USE OF INSULIN (MULTI): ICD-10-CM

## 2024-05-20 DIAGNOSIS — E55.9 VITAMIN D DEFICIENCY: ICD-10-CM

## 2024-05-20 DIAGNOSIS — D51.9 ANEMIA DUE TO VITAMIN B12 DEFICIENCY, UNSPECIFIED B12 DEFICIENCY TYPE: ICD-10-CM

## 2024-05-20 LAB
ALBUMIN SERPL BCP-MCNC: 4.6 G/DL (ref 3.4–5)
ALP SERPL-CCNC: 51 U/L (ref 33–136)
ALT SERPL W P-5'-P-CCNC: 19 U/L (ref 10–52)
ANION GAP SERPL CALC-SCNC: 16 MMOL/L (ref 10–20)
AST SERPL W P-5'-P-CCNC: 14 U/L (ref 9–39)
BASOPHILS # BLD AUTO: 0.03 X10*3/UL (ref 0–0.1)
BASOPHILS NFR BLD AUTO: 0.5 %
BILIRUB SERPL-MCNC: 0.4 MG/DL (ref 0–1.2)
BUN SERPL-MCNC: 24 MG/DL (ref 6–23)
CALCIUM SERPL-MCNC: 9.7 MG/DL (ref 8.6–10.3)
CHLORIDE SERPL-SCNC: 101 MMOL/L (ref 98–107)
CO2 SERPL-SCNC: 25 MMOL/L (ref 21–32)
CREAT SERPL-MCNC: 1.51 MG/DL (ref 0.5–1.3)
CREAT UR-MCNC: 188.3 MG/DL (ref 20–370)
EGFRCR SERPLBLD CKD-EPI 2021: 49 ML/MIN/1.73M*2
EOSINOPHIL # BLD AUTO: 0.12 X10*3/UL (ref 0–0.7)
EOSINOPHIL NFR BLD AUTO: 1.9 %
ERYTHROCYTE [DISTWIDTH] IN BLOOD BY AUTOMATED COUNT: 13.1 % (ref 11.5–14.5)
EST. AVERAGE GLUCOSE BLD GHB EST-MCNC: 226 MG/DL
GLUCOSE SERPL-MCNC: 272 MG/DL (ref 74–99)
HBA1C MFR BLD: 9.5 %
HCT VFR BLD AUTO: 43.3 % (ref 41–52)
HGB BLD-MCNC: 14.1 G/DL (ref 13.5–17.5)
IMM GRANULOCYTES # BLD AUTO: 0.02 X10*3/UL (ref 0–0.7)
IMM GRANULOCYTES NFR BLD AUTO: 0.3 % (ref 0–0.9)
LYMPHOCYTES # BLD AUTO: 1.43 X10*3/UL (ref 1.2–4.8)
LYMPHOCYTES NFR BLD AUTO: 22.9 %
MCH RBC QN AUTO: 30.9 PG (ref 26–34)
MCHC RBC AUTO-ENTMCNC: 32.6 G/DL (ref 32–36)
MCV RBC AUTO: 95 FL (ref 80–100)
MICROALBUMIN UR-MCNC: 63.8 MG/L
MICROALBUMIN/CREAT UR: 33.9 UG/MG CREAT
MONOCYTES # BLD AUTO: 0.43 X10*3/UL (ref 0.1–1)
MONOCYTES NFR BLD AUTO: 6.9 %
NEUTROPHILS # BLD AUTO: 4.22 X10*3/UL (ref 1.2–7.7)
NEUTROPHILS NFR BLD AUTO: 67.5 %
NRBC BLD-RTO: 0 /100 WBCS (ref 0–0)
PLATELET # BLD AUTO: 190 X10*3/UL (ref 150–450)
POTASSIUM SERPL-SCNC: 4.6 MMOL/L (ref 3.5–5.3)
PROT SERPL-MCNC: 7.5 G/DL (ref 6.4–8.2)
RBC # BLD AUTO: 4.57 X10*6/UL (ref 4.5–5.9)
SODIUM SERPL-SCNC: 137 MMOL/L (ref 136–145)
VIT B12 SERPL-MCNC: 464 PG/ML (ref 211–911)
WBC # BLD AUTO: 6.3 X10*3/UL (ref 4.4–11.3)

## 2024-05-20 PROCEDURE — 83036 HEMOGLOBIN GLYCOSYLATED A1C: CPT

## 2024-05-20 PROCEDURE — 80053 COMPREHEN METABOLIC PANEL: CPT

## 2024-05-20 PROCEDURE — 85025 COMPLETE CBC W/AUTO DIFF WBC: CPT

## 2024-05-20 PROCEDURE — 82043 UR ALBUMIN QUANTITATIVE: CPT

## 2024-05-20 PROCEDURE — 36415 COLL VENOUS BLD VENIPUNCTURE: CPT

## 2024-05-20 PROCEDURE — 82607 VITAMIN B-12: CPT

## 2024-05-20 PROCEDURE — 82570 ASSAY OF URINE CREATININE: CPT

## 2024-05-20 PROCEDURE — 80061 LIPID PANEL: CPT

## 2024-05-24 ENCOUNTER — OFFICE VISIT (OUTPATIENT)
Dept: PRIMARY CARE | Facility: CLINIC | Age: 69
End: 2024-05-24
Payer: MEDICARE

## 2024-05-24 VITALS
OXYGEN SATURATION: 96 % | WEIGHT: 182.6 LBS | BODY MASS INDEX: 26.14 KG/M2 | HEIGHT: 70 IN | SYSTOLIC BLOOD PRESSURE: 133 MMHG | HEART RATE: 82 BPM | DIASTOLIC BLOOD PRESSURE: 81 MMHG

## 2024-05-24 DIAGNOSIS — E55.9 VITAMIN D DEFICIENCY: ICD-10-CM

## 2024-05-24 DIAGNOSIS — K57.30 DIVERTICULOSIS OF COLON: Chronic | ICD-10-CM

## 2024-05-24 DIAGNOSIS — E78.00 PURE HYPERCHOLESTEROLEMIA WITH TARGET LOW DENSITY LIPOPROTEIN (LDL) CHOLESTEROL LESS THAN 70 MG/DL: ICD-10-CM

## 2024-05-24 DIAGNOSIS — E11.65 TYPE 2 DIABETES MELLITUS WITH HYPERGLYCEMIA, WITHOUT LONG-TERM CURRENT USE OF INSULIN (MULTI): ICD-10-CM

## 2024-05-24 DIAGNOSIS — N18.31 STAGE 3A CHRONIC KIDNEY DISEASE (MULTI): ICD-10-CM

## 2024-05-24 DIAGNOSIS — I10 ESSENTIAL HYPERTENSION: Primary | ICD-10-CM

## 2024-05-24 DIAGNOSIS — Z91.89 FRAMINGHAM CARDIAC RISK >20% IN NEXT 10 YEARS: Chronic | ICD-10-CM

## 2024-05-24 DIAGNOSIS — D51.9 ANEMIA DUE TO VITAMIN B12 DEFICIENCY, UNSPECIFIED B12 DEFICIENCY TYPE: ICD-10-CM

## 2024-05-24 DIAGNOSIS — D72.819 LEUKOPENIA, UNSPECIFIED TYPE: ICD-10-CM

## 2024-05-24 DIAGNOSIS — R80.9 MICROALBUMINURIA: ICD-10-CM

## 2024-05-24 LAB
CHOLEST SERPL-MCNC: 213 MG/DL (ref 0–199)
CHOLESTEROL/HDL RATIO: 3.5
HDLC SERPL-MCNC: 60.1 MG/DL
LDLC SERPL CALC-MCNC: 125 MG/DL
NON HDL CHOLESTEROL: 153 MG/DL (ref 0–149)
TRIGL SERPL-MCNC: 141 MG/DL (ref 0–149)
VLDL: 28 MG/DL (ref 0–40)

## 2024-05-24 PROCEDURE — 1123F ACP DISCUSS/DSCN MKR DOCD: CPT | Performed by: INTERNAL MEDICINE

## 2024-05-24 PROCEDURE — 1159F MED LIST DOCD IN RCRD: CPT | Performed by: INTERNAL MEDICINE

## 2024-05-24 PROCEDURE — 1160F RVW MEDS BY RX/DR IN RCRD: CPT | Performed by: INTERNAL MEDICINE

## 2024-05-24 PROCEDURE — 3060F POS MICROALBUMINURIA REV: CPT | Performed by: INTERNAL MEDICINE

## 2024-05-24 PROCEDURE — 3046F HEMOGLOBIN A1C LEVEL >9.0%: CPT | Performed by: INTERNAL MEDICINE

## 2024-05-24 PROCEDURE — 99213 OFFICE O/P EST LOW 20 MIN: CPT | Performed by: INTERNAL MEDICINE

## 2024-05-24 PROCEDURE — 3079F DIAST BP 80-89 MM HG: CPT | Performed by: INTERNAL MEDICINE

## 2024-05-24 PROCEDURE — 1036F TOBACCO NON-USER: CPT | Performed by: INTERNAL MEDICINE

## 2024-05-24 PROCEDURE — 3075F SYST BP GE 130 - 139MM HG: CPT | Performed by: INTERNAL MEDICINE

## 2024-05-24 RX ORDER — BISOPROLOL FUMARATE AND HYDROCHLOROTHIAZIDE 5; 6.25 MG/1; MG/1
TABLET ORAL
Qty: 90 TABLET | Refills: 0 | Status: SHIPPED | OUTPATIENT
Start: 2024-05-24

## 2024-05-24 RX ORDER — DULAGLUTIDE 0.75 MG/.5ML
0.75 INJECTION, SOLUTION SUBCUTANEOUS
Qty: 2 ML | Status: CANCELLED | OUTPATIENT
Start: 2024-05-26

## 2024-05-24 RX ORDER — SIMVASTATIN 10 MG/1
TABLET, FILM COATED ORAL
Qty: 90 TABLET | Refills: 0 | Status: SHIPPED | OUTPATIENT
Start: 2024-05-24

## 2024-05-24 RX ORDER — VIT C/E/ZN/COPPR/LUTEIN/ZEAXAN 250MG-90MG
CAPSULE ORAL
Qty: 90 CAPSULE | Refills: 1 | Status: SHIPPED | OUTPATIENT
Start: 2024-05-24

## 2024-05-24 RX ORDER — GLIPIZIDE 10 MG/1
TABLET ORAL
Qty: 180 TABLET | Refills: 0 | Status: SHIPPED | OUTPATIENT
Start: 2024-05-24

## 2024-05-24 RX ORDER — DULAGLUTIDE 1.5 MG/.5ML
1.5 INJECTION, SOLUTION SUBCUTANEOUS
Qty: 6 ML | Refills: 0 | Status: SHIPPED | OUTPATIENT
Start: 2024-05-26

## 2024-05-24 RX ORDER — DULAGLUTIDE 0.75 MG/.5ML
0.75 INJECTION, SOLUTION SUBCUTANEOUS
COMMUNITY
Start: 2024-02-19 | End: 2024-05-24 | Stop reason: ALTCHOICE

## 2024-05-24 NOTE — PROGRESS NOTES
"Subjective   Patient ID: Zackary Goode is a 69 y.o. male who presents for Annual Exam (Complete Exam Physical).    HPI   Compliant with medications, tolerating regimens.  Physically active.  Eating sensibly.  States that when he checks his blood sugar, usually gets less than 150 before breakfast.  ENDOCRINE with no polyuria, polydipsia, polyphagia.  No blurred vision.  No skin, hair, nail changes.  No dramatic weight loss or weight gain.  Still, remarkably, hemoglobin A1c came back at 9.    Patient due to see OPHTHALMOLOGY.  In the meantime, continues to want to improve quality of life, and does not wish harm to self or others.  No headache, blurred vision, diplopia.  No dysphagia.  No sonia substernal chest pain, no orthopnea, no paroxysmal nocturnal dyspnea.  No particular cough, no particular sputum production.  CONSTITUTIONALLY, no fever, no chills.  No night sweats.  No lingering anorexia or nausea.  No apparent lymphadenopathy.  No apparent weight loss.          Review of Systems  Review of systems as in history of present illness, and otherwise, reviewed separately as well, and was unremarkable/negative/noncontributory.        Objective   /81 (BP Location: Left arm, Patient Position: Sitting, BP Cuff Size: Adult)   Pulse 82   Ht 1.778 m (5' 10\")   Wt 82.8 kg (182 lb 9.6 oz)   SpO2 96%   BMI 26.20 kg/m²     Physical Exam  Mildly hard of hearing, but coping well.  Not in distress or diaphoresis.  Alert, oriented x 3.  Amiable.  Not unkempt.  Receptive, cheerful, appropriate, and eager to maintain and hopefully improve quality of life.  Does not wish harm to self or others.  Appropriate.    HEAD pink palpebral conjunctivae, anicteric sclerae.  NECK supple, no apparent jugular venous distention.  CARDIOVASCULAR not in distress or diaphoresis.  No bipedal edema.  LUNGS not in distress or diaphoresis.  Not using accessory muscles.  ABDOMEN soft, nontender.  BACK no costovertebral angle " tenderness.  EXTREMITIES no clubbing, no cyanosis.  NEURO no facial asymmetry.  No apparent cranial nerve deficits.  Romberg negative.  Ambulating without need of assistance.  No apparent focal weakness.  No tremors.  PSYCH receptive, appropriate, and eager to maintain and improve quality of life.        LABORATORY results reviewed with patient.        Assessment/Plan   Diagnoses and all orders for this visit:  Essential hypertension  -     Follow Up In Primary Care - Established  -     bisoproloL-hydrochlorothiazide (Ziac) 5-6.25 mg tablet; Please take 1 tablet by mouth with all Breakfast.  Thank you.  Pure hypercholesterolemia with target low density lipoprotein (LDL) cholesterol less than 70 mg/dL  -     simvastatin (Zocor) 10 mg tablet; Please take 1 tablet by mouth at bedtime.  Thank you.  -     Lipid Panel; Future  Type 2 diabetes mellitus with hyperglycemia, without long-term current use of insulin ([facility])  -     glipiZIDE (Glucotrol) 10 mg tablet; Please take 1 tablet by mouth with breakfast, and again 1 tablet by mouth with supper.  Always with food please.  Do not take if you are not going to eat.  -     dulaglutide (Trulicity) 1.5 mg/0.5 mL pen injector injection; Inject 1.5 mg under the skin 1 (one) time per week.  [place] cardiac risk >20% in next 10 years  Comments:  36.9% 01/2024  Stage 3a chronic kidney disease ([facility])  Microalbuminuria  Vitamin D deficiency  Anemia due to vitamin B12 deficiency, unspecified B12 deficiency type  Leukopenia, unspecified type  Diverticulosis of colon  Comments:  COLONOSCOPY 03/2018, repeat 10 years       Thank you very much for coming.  I am very happy to see you.    I am a little surprised, just like you are, that your hemoglobin A1c came back at 9.  I do understand that when you check your sugars, they are usually less than 150 before breakfast.    The next best step would be to adjust your medications accordingly:  Increase your GLIPIZIDE from 5 mg, now take 10  mg by mouth with breakfast, and again with supper.  Please take with food twice a day.  If you are not going to eat, please do not take your glipizide.    Also, increase your TRULICITY from 0.75 mg, now used the 1.5 mg formulation.  Inject under your skin once a week, as before.  Trulicity can be used with or without eating.  Once a week please.    Please continue maximizing DIET.  [place] diet book, DASH diet for ideas.    Please continue staying physically active.  Please do BRISK WALKING or similar at least 10 minutes in the morning, and again 10 minutes in the afternoon, for a minimum of 20 minutes a day, every day, including Sunday!    Otherwise, your other laboratory results came back good.  I am waiting on your CHOLESTEROL panel.    Your VITAMIN B12 is also adequately replenished!  Please continue taking your VITAMIN B12 every day.  You should also be on a MULTIVITAMIN like Centrum Silver.  Take your multivitamin with breakfast, and vitamin B12 with lunch, take them separately, so that they will be absorbed more efficiently by your body!    Also, please add VITAMIN D3 to your regimen.  Take 1000 unit capsule with LUNCH every day.  Again, multivitamin with breakfast, and vitamin D3 with lunch, so that you absorbs them more efficiently.    It is a good idea for you to get VACCINATED for SHINGLES anytime soon.  Your local friendly pharmacy can do this for you!  Shingrix comes in a series of 2 injections at least 1 month apart.  Please get this done.  Highly recommended.    Likewise, please keep your appointment with your EYE SPECIALIST.    Please come back in 3 months.  Repeat NONFASTING blood examination at that time, then see me soon after, so that we can recheck how your blood sugar is behaving.    Until then, please continue to take care of yourself and your family, and please continue to pray for our recovery from this pandemic.  [place] happy birthday!  Take care and God bless.            0  Return in  3 weeks.  40 minutes please.  Repeat NONFASTING blood examination, then see me for COMPLETE physical examination for the year.  Reassess blood sugar control.  Consider referral to nutritionist, endocrinologist, as needed.  Reassess hearing, preventive strategies, cardiovascular risk.            0

## 2024-05-24 NOTE — PATIENT INSTRUCTIONS
Thank you very much for coming.  I am very happy to see you.    I am a little surprised, just like you are, that your hemoglobin A1c came back at 9.  I do understand that when you check your sugars, they are usually less than 150 before breakfast.    The next best step would be to adjust your medications accordingly:  Increase your GLIPIZIDE from 5 mg, now take 10 mg by mouth with breakfast, and again with supper.  Please take with food twice a day.  If you are not going to eat, please do not take your glipizide.    Also, increase your TRULICITY from 0.75 mg, now used the 1.5 mg formulation.  Inject under your skin once a week, as before.  Trulicity can be used with or without eating.  Once a week please.    Please continue maximizing DIET.  [place] diet book, DASH diet for ideas.    Please continue staying physically active.  Please do BRISK WALKING or similar at least 10 minutes in the morning, and again 10 minutes in the afternoon, for a minimum of 20 minutes a day, every day, including Sunday!    Otherwise, your other laboratory results came back good.  I am waiting on your CHOLESTEROL panel.    Your VITAMIN B12 is also adequately replenished!  Please continue taking your VITAMIN B12 every day.  You should also be on a MULTIVITAMIN like Centrum Silver.  Take your multivitamin with breakfast, and vitamin B12 with lunch, take them separately, so that they will be absorbed more efficiently by your body!    Also, please add VITAMIN D3 to your regimen.  Take 1000 unit capsule with LUNCH every day.  Again, multivitamin with breakfast, and vitamin D3 with lunch, so that you absorbs them more efficiently.    It is a good idea for you to get VACCINATED for SHINGLES anytime soon.  Your local friendly pharmacy can do this for you!  Shingrix comes in a series of 2 injections at least 1 month apart.  Please get this done.  Highly recommended.    Likewise, please keep your appointment with your EYE SPECIALIST.    Please  come back in 3 months.  Repeat NONFASTING blood examination at that time, then see me soon after, so that we can recheck how your blood sugar is behaving.    Until then, please continue to take care of yourself and your family, and please continue to pray for our recovery from this pandemic.  [place] happy birthday!  Take care and God bless.            0  Return in 3 weeks.  40 minutes please.  Repeat NONFASTING blood examination, then see me for COMPLETE physical examination for the year.  Reassess blood sugar control.  Consider referral to nutritionist, endocrinologist, as needed.  Reassess hearing, preventive strategies, cardiovascular risk.            0

## 2024-08-01 DIAGNOSIS — E11.65 TYPE 2 DIABETES MELLITUS WITH HYPERGLYCEMIA, WITHOUT LONG-TERM CURRENT USE OF INSULIN (MULTI): Primary | ICD-10-CM

## 2024-08-02 RX ORDER — GLIPIZIDE 5 MG/1
TABLET ORAL
Qty: 180 TABLET | Refills: 0 | Status: SHIPPED | OUTPATIENT
Start: 2024-08-02

## 2024-08-21 DIAGNOSIS — E11.65 TYPE 2 DIABETES MELLITUS WITH HYPERGLYCEMIA, WITHOUT LONG-TERM CURRENT USE OF INSULIN (MULTI): ICD-10-CM

## 2024-08-26 RX ORDER — GLIPIZIDE 10 MG/1
TABLET ORAL
Qty: 180 TABLET | Refills: 0 | Status: SHIPPED | OUTPATIENT
Start: 2024-08-26 | End: 2024-08-27 | Stop reason: SDUPTHER

## 2024-08-26 ASSESSMENT — PROMIS GLOBAL HEALTH SCALE
RATE_GENERAL_HEALTH: VERY GOOD
RATE_MENTAL_HEALTH: VERY GOOD
CARRYOUT_PHYSICAL_ACTIVITIES: COMPLETELY
RATE_SOCIAL_SATISFACTION: VERY GOOD
EMOTIONAL_PROBLEMS: NEVER
RATE_PHYSICAL_HEALTH: VERY GOOD
CARRYOUT_SOCIAL_ACTIVITIES: VERY GOOD
RATE_QUALITY_OF_LIFE: VERY GOOD
RATE_AVERAGE_PAIN: 1

## 2024-08-27 ENCOUNTER — APPOINTMENT (OUTPATIENT)
Dept: PRIMARY CARE | Facility: CLINIC | Age: 69
End: 2024-08-27
Payer: MEDICARE

## 2024-08-27 VITALS
WEIGHT: 179.9 LBS | HEART RATE: 78 BPM | OXYGEN SATURATION: 97 % | HEIGHT: 70 IN | SYSTOLIC BLOOD PRESSURE: 135 MMHG | DIASTOLIC BLOOD PRESSURE: 77 MMHG | BODY MASS INDEX: 25.75 KG/M2

## 2024-08-27 DIAGNOSIS — E78.00 PURE HYPERCHOLESTEROLEMIA WITH TARGET LOW DENSITY LIPOPROTEIN (LDL) CHOLESTEROL LESS THAN 70 MG/DL: ICD-10-CM

## 2024-08-27 DIAGNOSIS — I10 ESSENTIAL HYPERTENSION: Primary | ICD-10-CM

## 2024-08-27 DIAGNOSIS — D51.9 ANEMIA DUE TO VITAMIN B12 DEFICIENCY, UNSPECIFIED B12 DEFICIENCY TYPE: ICD-10-CM

## 2024-08-27 DIAGNOSIS — N18.31 STAGE 3A CHRONIC KIDNEY DISEASE (MULTI): ICD-10-CM

## 2024-08-27 DIAGNOSIS — E11.65 TYPE 2 DIABETES MELLITUS WITH HYPERGLYCEMIA, WITHOUT LONG-TERM CURRENT USE OF INSULIN (MULTI): ICD-10-CM

## 2024-08-27 DIAGNOSIS — R80.9 MICROALBUMINURIA: ICD-10-CM

## 2024-08-27 DIAGNOSIS — E55.9 VITAMIN D DEFICIENCY: ICD-10-CM

## 2024-08-27 DIAGNOSIS — Z91.89 FRAMINGHAM CARDIAC RISK >20% IN NEXT 10 YEARS: Chronic | ICD-10-CM

## 2024-08-27 DIAGNOSIS — H90.3 SENSORINEURAL HEARING LOSS (SNHL) OF BOTH EARS: ICD-10-CM

## 2024-08-27 PROCEDURE — 3046F HEMOGLOBIN A1C LEVEL >9.0%: CPT | Performed by: INTERNAL MEDICINE

## 2024-08-27 PROCEDURE — 1036F TOBACCO NON-USER: CPT | Performed by: INTERNAL MEDICINE

## 2024-08-27 PROCEDURE — 3060F POS MICROALBUMINURIA REV: CPT | Performed by: INTERNAL MEDICINE

## 2024-08-27 PROCEDURE — 99213 OFFICE O/P EST LOW 20 MIN: CPT | Performed by: INTERNAL MEDICINE

## 2024-08-27 PROCEDURE — 1159F MED LIST DOCD IN RCRD: CPT | Performed by: INTERNAL MEDICINE

## 2024-08-27 PROCEDURE — 90677 PCV20 VACCINE IM: CPT | Performed by: INTERNAL MEDICINE

## 2024-08-27 PROCEDURE — 3049F LDL-C 100-129 MG/DL: CPT | Performed by: INTERNAL MEDICINE

## 2024-08-27 PROCEDURE — 1160F RVW MEDS BY RX/DR IN RCRD: CPT | Performed by: INTERNAL MEDICINE

## 2024-08-27 PROCEDURE — 3078F DIAST BP <80 MM HG: CPT | Performed by: INTERNAL MEDICINE

## 2024-08-27 PROCEDURE — 3008F BODY MASS INDEX DOCD: CPT | Performed by: INTERNAL MEDICINE

## 2024-08-27 PROCEDURE — G0009 ADMIN PNEUMOCOCCAL VACCINE: HCPCS | Performed by: INTERNAL MEDICINE

## 2024-08-27 PROCEDURE — 1123F ACP DISCUSS/DSCN MKR DOCD: CPT | Performed by: INTERNAL MEDICINE

## 2024-08-27 PROCEDURE — 3075F SYST BP GE 130 - 139MM HG: CPT | Performed by: INTERNAL MEDICINE

## 2024-08-27 RX ORDER — VIT C/E/ZN/COPPR/LUTEIN/ZEAXAN 250MG-90MG
CAPSULE ORAL
Qty: 90 CAPSULE | Refills: 1 | Status: SHIPPED | OUTPATIENT
Start: 2024-08-27

## 2024-08-27 RX ORDER — SIMVASTATIN 10 MG/1
TABLET, FILM COATED ORAL
Qty: 90 TABLET | Refills: 0 | Status: SHIPPED | OUTPATIENT
Start: 2024-08-27

## 2024-08-27 RX ORDER — BISOPROLOL FUMARATE AND HYDROCHLOROTHIAZIDE 5; 6.25 MG/1; MG/1
TABLET ORAL
Qty: 90 TABLET | Refills: 0 | Status: SHIPPED | OUTPATIENT
Start: 2024-08-27

## 2024-08-27 RX ORDER — GLIPIZIDE 10 MG/1
TABLET ORAL
Qty: 90 TABLET | Refills: 0 | Status: SHIPPED | OUTPATIENT
Start: 2024-08-27

## 2024-08-27 RX ORDER — LANOLIN ALCOHOL/MO/W.PET/CERES
CREAM (GRAM) TOPICAL
Qty: 180 TABLET | Refills: 3 | Status: SHIPPED | OUTPATIENT
Start: 2024-08-27

## 2024-08-27 NOTE — PROGRESS NOTES
"Subjective   Patient ID: Zackary Goode is a 69 y.o. male who presents for Annual Exam (Complete Physical Exam).    HPI   The patient has no particular complaints.  Mildly hard of hearing, but seemingly coping well, and not worried himself about his hearing.    Physically active.  Eating sensibly.  Trying to maintain weight.  Happy that at a height of 510, he is now 175 pounds, and would like to see if he can lose further.  ENDOCRINE with no polyuria, polydipsia, polyphagia.  No blurred vision.  No skin, hair, nail changes.  No dramatic weight loss or weight gain.  Understands that he is due for repeat FASTING laboratory examinations soon.  Compliant with medications, tolerating regimens.    No sonia substernal chest pain, no orthopnea, no paroxysmal nocturnal dyspnea.  No headache, blurred vision, diplopia.  No dysphagia.  Continues to want to improve quality of life, and does not wish harm to self or others.  No particular cough, no particular sputum production.  No skin changes.  Denies any urinary symptoms.        Review of Systems  Review of systems as in history of present illness, and otherwise, reviewed separately as well, and was unremarkable/negative/noncontributory.        Objective   /77 (BP Location: Left arm, Patient Position: Sitting, BP Cuff Size: Adult)   Pulse 78   Ht 1.778 m (5' 10\")   Wt 81.6 kg (179 lb 14.4 oz)   SpO2 97%   BMI 25.81 kg/m²     Physical Exam  In very good spirits.  Not in distress or diaphoresis.  Receptive, oriented x 3.  Amiable.  Not unkempt.  Moderately built.  Receptive, cheerful, appropriate, and eager to maintain and hopefully improve quality of life.  Does not wish harm to self or others.  Hard of hearing, but coping without need of assistive device.    HEAD pink palpebral conjunctivae, anicteric sclerae.  NECK supple, no apparent jugular venous distention.  CARDIOVASCULAR not in distress or diaphoresis.  No bipedal edema.  LUNGS not in distress or " diaphoresis.  Not using accessory muscles.  ABDOMEN soft, nontender.  BACK no costovertebral angle tenderness.  EXTREMITIES no clubbing, no cyanosis.  NEURO no facial asymmetry.  No apparent cranial nerve deficits.  Romberg negative.  Ambulating without need of assistance.  No apparent focal weakness.  No tremors.  PSYCH receptive, appropriate, and eager to maintain and improve quality of life.        LABORATORY results reviewed with patient.  Kidney function stable.  Liver function preserved.  No remarkable anemia last lab check.  Hemoglobin A1c 9.5.  Vitamin B12 replenished.  LDL cholesterol 125 with overall cardiovascular risk 39%.  Positive albumin in urine.  December vitamin D 27.        Assessment/Plan   Diagnoses and all orders for this visit:  Essential hypertension  -     bisoproloL-hydrochlorothiazide (Ziac) 5-6.25 mg tablet; Please take 1 tablet by mouth with all Breakfast.  Thank you.  -     Follow Up In Primary Care - Established; Future  -     Pneumococcal conjugate vaccine, 20-valent (PREVNAR 20)  Type 2 diabetes mellitus with hyperglycemia, without long-term current use of insulin (Multi)  -     glipiZIDE (Glucotrol) 10 mg tablet; Please take 1 tablet by mouth with food every day.  Please do not take if you are not going to eat.  Thank you.  -     Follow Up In Primary Care - Established; Future  -     Pneumococcal conjugate vaccine, 20-valent (PREVNAR 20)  Pure hypercholesterolemia with target low density lipoprotein (LDL) cholesterol less than 70 mg/dL  -     simvastatin (Zocor) 10 mg tablet; Please take 1 tablet by mouth at bedtime.  Thank you.  -     Follow Up In Primary Care - Established; Future  -     Pneumococcal conjugate vaccine, 20-valent (PREVNAR 20)  [place] cardiac risk >20% in next 10 years  Comments:  39.8% 08/2024  Orders:  -     Follow Up In Primary Care - Established; Future  -     Pneumococcal conjugate vaccine, 20-valent (PREVNAR 20)  Vitamin D deficiency  -     cholecalciferol  (Vitamin D-3) 25 MCG (1000 UT) capsule; Please take 1 capsule by mouth with LUNCH every day.  Lots of fluids throughout the day.  Thank you.  -     Follow Up In Primary Care - Established; Future  -     Pneumococcal conjugate vaccine, 20-valent (PREVNAR 20)  Stage 3a chronic kidney disease (Multi)  -     Follow Up In Primary Care - Established; Future  -     Pneumococcal conjugate vaccine, 20-valent (PREVNAR 20)  Anemia due to vitamin B12 deficiency, unspecified B12 deficiency type  -     cyanocobalamin (Vitamin B-12) 1,000 mcg tablet; Please take 1 tablet by mouth with breakfast, and again 1 tablet by mouth with lunch.  Do not take with supper or it may cause insomnia.  Thank you.  -     Follow Up In Primary Care - Established; Future  -     Pneumococcal conjugate vaccine, 20-valent (PREVNAR 20)  Microalbuminuria  -     Follow Up In Primary Care - Established; Future  -     Pneumococcal conjugate vaccine, 20-valent (PREVNAR 20)  Sensorineural hearing loss (SNHL) of both ears  -     Follow Up In Primary Care - Established; Future  -     Pneumococcal conjugate vaccine, 20-valent (PREVNAR 20)       Thank you very much for coming.  I am very happy to see you.    You are due for repeat FASTING laboratory examinations.  Please have these done at the [place] facility close to your house!  I am sorry that my office is not there, but I am happy that you continue to visit me here in [facility]!    For now, until we see your numbers numbers, please take your GLIPIZIDE 10 mg once a day only.  I will readjust it depending on what your numbers look like, both your blood sugar numbers, and your kidney numbers.    Please come back in 5 months, in JANUARY.  It will be time for us to repeat your Medicare Wellness evaluation for year 2025.    Until then, you will benefit from VACCINATIONS:  PNEUMONIA vaccination PREVNAR 20 today.  You may experience a little pain and achiness along the arm where you had your injection, but you  should be okay after a day or so.  Drink lots of fluids throughout the day.  Get lots of rest and sleep.  This pneumonia vaccination is good for your whole lifetime, and it replaces the old pneumonia vaccinations Pneumovax and Prevnar 13.  Again, good for your whole lifetime!  Highly recommended, and to be paid for by your insurance.    The next vaccination I would recommend would be for INFLUENZA.  Wait until September 15 before you get your HIGH DOSE FLU vaccination.  You can get this from your local friendly pharmacist!    After about 2 weeks, consider getting vaccinated for COVID.  Wait and see what the news says about other patients who may have already gotten the COVID vaccination.  If there are no side effects, then go ahead and get this vaccination.    Again, PNEUMONIA vaccination today,   Then INFLUENZA high dose vaccination September 15,   and then the COVID vaccine if it looks like it is safe to take!    Please get your vaccinations from your local friendly pharmacy.  Please do not get more than 1 vaccination per visit to the pharmacy.  This way, you can get the most robust response from each vaccination!    Please drink lots of fluids throughout the day.  Lots of rest and sleep.  Do not forget to do mild Fasting laboratory examinations soon.  I will call with results and possible changes.    Again, thank you very much for coming.  I wish you good health, a happy Thanksgiving, a merry [place], and a happy new year!  See you in January.  Call or text sooner as needed.  Take care and God bless.            0  Return in 5 months.  40 minutes please.  Medicare Wellness evaluation.  Consider repeat FASTING laboratory examinations, hemoglobin A1c determination.  Review preventive strategies, cardiovascular risk.  Reassess hearing.  Reassess efforts regarding weight management.            0  Not on METFORMIN at this time, reassess kidney function.  Not on GLP-1 RA at this time.  Reconsider especially if with  persistence of elevated hemoglobin A1c.            0

## 2024-08-27 NOTE — PATIENT INSTRUCTIONS
Thank you very much for coming.  I am very happy to see you.    You are due for repeat FASTING laboratory examinations.  Please have these done at the [place] facility close to your house!  I am sorry that my office is not there, but I am happy that you continue to visit me here in [facility]!    For now, until we see your numbers numbers, please take your GLIPIZIDE 10 mg once a day only.  I will readjust it depending on what your numbers look like, both your blood sugar numbers, and your kidney numbers.    Please come back in 5 months, in JANUARY.  It will be time for us to repeat your Medicare Wellness evaluation for year 2025.    Until then, you will benefit from VACCINATIONS:  PNEUMONIA vaccination PREVNAR 20 today.  You may experience a little pain and achiness along the arm where you had your injection, but you should be okay after a day or so.  Drink lots of fluids throughout the day.  Get lots of rest and sleep.  This pneumonia vaccination is good for your whole lifetime, and it replaces the old pneumonia vaccinations Pneumovax and Prevnar 13.  Again, good for your whole lifetime!  Highly recommended, and to be paid for by your insurance.    The next vaccination I would recommend would be for INFLUENZA.  Wait until September 15 before you get your HIGH DOSE FLU vaccination.  You can get this from your local friendly pharmacist!    After about 2 weeks, consider getting vaccinated for COVID.  Wait and see what the news says about other patients who may have already gotten the COVID vaccination.  If there are no side effects, then go ahead and get this vaccination.    Again, PNEUMONIA vaccination today,   Then INFLUENZA high dose vaccination September 15,   and then the COVID vaccine if it looks like it is safe to take!    Please get your vaccinations from your local friendly pharmacy.  Please do not get more than 1 vaccination per visit to the pharmacy.  This way, you can get the most robust response  from each vaccination!    Please drink lots of fluids throughout the day.  Lots of rest and sleep.  Do not forget to do mild Fasting laboratory examinations soon.  I will call with results and possible changes.    Again, thank you very much for coming.  I wish you good health, a happy Thanksgiving, a merry [place], and a happy new year!  See you in January.  Call or text sooner as needed.  Take care and God bless.            0  Return in 5 months.  40 minutes please.  Medicare Wellness evaluation.  Consider repeat FASTING laboratory examinations, hemoglobin A1c determination.  Review preventive strategies, cardiovascular risk.  Reassess hearing.  Reassess efforts regarding weight management.            0

## 2024-09-14 ENCOUNTER — HOSPITAL ENCOUNTER (EMERGENCY)
Age: 69
Discharge: HOME | End: 2024-09-14
Attending: STUDENT IN AN ORGANIZED HEALTH CARE EDUCATION/TRAINING PROGRAM
Payer: MEDICARE

## 2024-09-14 ENCOUNTER — APPOINTMENT (OUTPATIENT)
Dept: GENERAL RADIOLOGY | Age: 69
End: 2024-09-14
Payer: MEDICARE

## 2024-09-14 VITALS
WEIGHT: 178 LBS | DIASTOLIC BLOOD PRESSURE: 59 MMHG | SYSTOLIC BLOOD PRESSURE: 99 MMHG | HEART RATE: 97 BPM | TEMPERATURE: 100.9 F | RESPIRATION RATE: 18 BRPM | HEIGHT: 70 IN | OXYGEN SATURATION: 96 % | BODY MASS INDEX: 25.48 KG/M2

## 2024-09-14 DIAGNOSIS — S80.02XA CONTUSION OF LEFT KNEE, INITIAL ENCOUNTER: ICD-10-CM
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
## 2024-09-14 DIAGNOSIS — U07.1 COVID-19: Primary | ICD-10-CM

## 2024-09-14 LAB
ALBUMIN SERPL-MCNC: 4.1 G/DL (ref 3.5–4.6)
ALP SERPL-CCNC: 51 U/L (ref 35–104)
ALT SERPL-CCNC: 19 U/L (ref 0–41)
ANION GAP SERPL CALCULATED.3IONS-SCNC: 11 MEQ/L (ref 9–15)
AST SERPL-CCNC: 17 U/L (ref 0–40)
BASOPHILS # BLD: 0 K/UL (ref 0–0.2)
BASOPHILS NFR BLD: 0.3 %
BILIRUB SERPL-MCNC: 0.4 MG/DL (ref 0.2–0.7)
BILIRUB UR QL STRIP: NEGATIVE
BUN SERPL-MCNC: 13 MG/DL (ref 8–23)
CALCIUM SERPL-MCNC: 9.4 MG/DL (ref 8.5–9.9)
CHLORIDE SERPL-SCNC: 96 MEQ/L (ref 95–107)
CLARITY UR: CLEAR
CO2 SERPL-SCNC: 27 MEQ/L (ref 20–31)
COLOR UR: YELLOW
CREAT SERPL-MCNC: 1.38 MG/DL (ref 0.7–1.2)
EKG ATRIAL RATE: 96 BPM
EKG P AXIS: 11 DEGREES
EKG P-R INTERVAL: 146 MS
EKG Q-T INTERVAL: 332 MS
EKG QRS DURATION: 94 MS
EKG QTC CALCULATION (BAZETT): 419 MS
EKG R AXIS: 6 DEGREES
EKG T AXIS: 43 DEGREES
EKG VENTRICULAR RATE: 96 BPM
EOSINOPHIL # BLD: 0 K/UL (ref 0–0.7)
EOSINOPHIL NFR BLD: 0.4 %
ERYTHROCYTE [DISTWIDTH] IN BLOOD BY AUTOMATED COUNT: 13 % (ref 11.5–14.5)
GLOBULIN SER CALC-MCNC: 2.9 G/DL (ref 2.3–3.5)
GLUCOSE SERPL-MCNC: 96 MG/DL (ref 70–99)
GLUCOSE UR STRIP-MCNC: NEGATIVE MG/DL
HCT VFR BLD AUTO: 37.8 % (ref 42–52)
HGB BLD-MCNC: 12.6 G/DL (ref 14–18)
HGB UR QL STRIP: NEGATIVE
KETONES UR STRIP-MCNC: NEGATIVE MG/DL
LACTATE BLDV-SCNC: 1 MMOL/L (ref 0.5–2.2)
LEUKOCYTE ESTERASE UR QL STRIP: NEGATIVE
LYMPHOCYTES # BLD: 0.6 K/UL (ref 1–4.8)
LYMPHOCYTES NFR BLD: 8.3 %
MCH RBC QN AUTO: 30.7 PG (ref 27–31.3)
MCHC RBC AUTO-ENTMCNC: 33.3 % (ref 33–37)
MCV RBC AUTO: 92 FL (ref 79–92.2)
MONOCYTES # BLD: 0.7 K/UL (ref 0.2–0.8)
MONOCYTES NFR BLD: 10.5 %
NEUTROPHILS # BLD: 5.5 K/UL (ref 1.4–6.5)
NEUTS SEG NFR BLD: 80.1 %
NITRITE UR QL STRIP: NEGATIVE
PH UR STRIP: 6.5 [PH] (ref 5–9)
PLATELET # BLD AUTO: 197 K/UL (ref 130–400)
POTASSIUM SERPL-SCNC: 3.8 MEQ/L (ref 3.4–4.9)
PROT SERPL-MCNC: 7 G/DL (ref 6.3–8)
PROT UR STRIP-MCNC: NEGATIVE MG/DL
RBC # BLD AUTO: 4.11 M/UL (ref 4.7–6.1)
SARS-COV-2 RDRP RESP QL NAA+PROBE: DETECTED
SODIUM SERPL-SCNC: 134 MEQ/L (ref 135–144)
SP GR UR STRIP: 1.01 (ref 1–1.03)
URINE REFLEX TO CULTURE: NORMAL
UROBILINOGEN UR STRIP-ACNC: 0.2 E.U./DL
WBC # BLD AUTO: 6.8 K/UL (ref 4.8–10.8)

## 2024-09-14 PROCEDURE — 71045 X-RAY EXAM CHEST 1 VIEW: CPT

## 2024-09-14 PROCEDURE — 80053 COMPREHEN METABOLIC PANEL: CPT

## 2024-09-14 PROCEDURE — 83605 ASSAY OF LACTIC ACID: CPT

## 2024-09-14 PROCEDURE — 6360000002 HC RX W HCPCS: Performed by: STUDENT IN AN ORGANIZED HEALTH CARE EDUCATION/TRAINING PROGRAM

## 2024-09-14 PROCEDURE — 96374 THER/PROPH/DIAG INJ IV PUSH: CPT

## 2024-09-14 PROCEDURE — 73560 X-RAY EXAM OF KNEE 1 OR 2: CPT

## 2024-09-14 PROCEDURE — 81003 URINALYSIS AUTO W/O SCOPE: CPT

## 2024-09-14 PROCEDURE — 99285 EMERGENCY DEPT VISIT HI MDM: CPT

## 2024-09-14 PROCEDURE — 93005 ELECTROCARDIOGRAM TRACING: CPT | Performed by: STUDENT IN AN ORGANIZED HEALTH CARE EDUCATION/TRAINING PROGRAM

## 2024-09-14 PROCEDURE — 87635 SARS-COV-2 COVID-19 AMP PRB: CPT

## 2024-09-14 PROCEDURE — 6370000000 HC RX 637 (ALT 250 FOR IP): Performed by: STUDENT IN AN ORGANIZED HEALTH CARE EDUCATION/TRAINING PROGRAM

## 2024-09-14 PROCEDURE — 85025 COMPLETE CBC W/AUTO DIFF WBC: CPT

## 2024-09-14 RX ORDER — ACETAMINOPHEN 500 MG
1000 TABLET ORAL ONCE
Status: COMPLETED | OUTPATIENT
Start: 2024-09-14 | End: 2024-09-14

## 2024-09-14 RX ORDER — NIRMATRELVIR AND RITONAVIR 150-100 MG
KIT ORAL
Qty: 20 TABLET | Refills: 0 | Status: SHIPPED | OUTPATIENT
Start: 2024-09-14 | End: 2024-09-19

## 2024-09-14 RX ORDER — KETOROLAC TROMETHAMINE 30 MG/ML
30 INJECTION, SOLUTION INTRAMUSCULAR; INTRAVENOUS ONCE
Status: COMPLETED | OUTPATIENT
Start: 2024-09-14 | End: 2024-09-14

## 2024-09-14 RX ADMIN — KETOROLAC TROMETHAMINE 30 MG: 30 INJECTION, SOLUTION INTRAMUSCULAR at 17:12

## 2024-09-14 RX ADMIN — ACETAMINOPHEN 1000 MG: 500 TABLET ORAL at 16:40

## 2024-09-14 ASSESSMENT — PAIN - FUNCTIONAL ASSESSMENT
PAIN_FUNCTIONAL_ASSESSMENT: 0-10
PAIN_FUNCTIONAL_ASSESSMENT: NONE - DENIES PAIN
PAIN_FUNCTIONAL_ASSESSMENT: 0-10

## 2024-09-14 ASSESSMENT — PAIN DESCRIPTION - ORIENTATION
ORIENTATION: LEFT

## 2024-09-14 ASSESSMENT — LIFESTYLE VARIABLES
HOW MANY STANDARD DRINKS CONTAINING ALCOHOL DO YOU HAVE ON A TYPICAL DAY: PATIENT DOES NOT DRINK
HOW OFTEN DO YOU HAVE A DRINK CONTAINING ALCOHOL: NEVER
HOW MANY STANDARD DRINKS CONTAINING ALCOHOL DO YOU HAVE ON A TYPICAL DAY: PATIENT DOES NOT DRINK
HOW OFTEN DO YOU HAVE A DRINK CONTAINING ALCOHOL: NEVER

## 2024-09-14 ASSESSMENT — PAIN SCALES - GENERAL
PAINLEVEL_OUTOF10: 9

## 2024-09-14 ASSESSMENT — PAIN DESCRIPTION - LOCATION
LOCATION: KNEE

## 2024-09-14 ASSESSMENT — PAIN DESCRIPTION - DESCRIPTORS
DESCRIPTORS: ACHING

## 2024-09-16 LAB
EKG ATRIAL RATE: 96 BPM
EKG P AXIS: 11 DEGREES
EKG P-R INTERVAL: 146 MS
EKG Q-T INTERVAL: 332 MS
EKG QRS DURATION: 94 MS
EKG QTC CALCULATION (BAZETT): 419 MS
EKG R AXIS: 6 DEGREES
EKG T AXIS: 43 DEGREES
EKG VENTRICULAR RATE: 96 BPM

## 2024-09-17 ENCOUNTER — TELEPHONE (OUTPATIENT)
Dept: PRIMARY CARE | Facility: CLINIC | Age: 69
End: 2024-09-17
Payer: MEDICARE

## 2024-09-17 NOTE — TELEPHONE ENCOUNTER
Patient was given  PAXLOVID from the ER- visit.  Patient was told by pharmacist - not to take -  because of his Diabetes , CKD.  Please advise.

## 2024-10-08 ENCOUNTER — APPOINTMENT (OUTPATIENT)
Dept: PRIMARY CARE | Facility: CLINIC | Age: 69
End: 2024-10-08
Payer: MEDICARE

## 2024-10-08 VITALS
WEIGHT: 176.1 LBS | SYSTOLIC BLOOD PRESSURE: 146 MMHG | TEMPERATURE: 98.3 F | OXYGEN SATURATION: 94 % | HEIGHT: 70 IN | DIASTOLIC BLOOD PRESSURE: 79 MMHG | BODY MASS INDEX: 25.21 KG/M2 | HEART RATE: 84 BPM

## 2024-10-08 DIAGNOSIS — E11.65 TYPE 2 DIABETES MELLITUS WITH HYPERGLYCEMIA, WITHOUT LONG-TERM CURRENT USE OF INSULIN: ICD-10-CM

## 2024-10-08 DIAGNOSIS — E55.9 VITAMIN D DEFICIENCY: ICD-10-CM

## 2024-10-08 DIAGNOSIS — E78.00 PURE HYPERCHOLESTEROLEMIA WITH TARGET LOW DENSITY LIPOPROTEIN (LDL) CHOLESTEROL LESS THAN 70 MG/DL: ICD-10-CM

## 2024-10-08 DIAGNOSIS — I10 ESSENTIAL HYPERTENSION: ICD-10-CM

## 2024-10-08 DIAGNOSIS — R80.9 MICROALBUMINURIA: ICD-10-CM

## 2024-10-08 DIAGNOSIS — R53.81 DEBILITY: Primary | ICD-10-CM

## 2024-10-08 DIAGNOSIS — N18.31 STAGE 3A CHRONIC KIDNEY DISEASE (MULTI): ICD-10-CM

## 2024-10-08 DIAGNOSIS — D51.9 ANEMIA DUE TO VITAMIN B12 DEFICIENCY, UNSPECIFIED B12 DEFICIENCY TYPE: ICD-10-CM

## 2024-10-08 PROCEDURE — 1160F RVW MEDS BY RX/DR IN RCRD: CPT | Performed by: INTERNAL MEDICINE

## 2024-10-08 PROCEDURE — 1036F TOBACCO NON-USER: CPT | Performed by: INTERNAL MEDICINE

## 2024-10-08 PROCEDURE — 3077F SYST BP >= 140 MM HG: CPT | Performed by: INTERNAL MEDICINE

## 2024-10-08 PROCEDURE — 3008F BODY MASS INDEX DOCD: CPT | Performed by: INTERNAL MEDICINE

## 2024-10-08 PROCEDURE — 1159F MED LIST DOCD IN RCRD: CPT | Performed by: INTERNAL MEDICINE

## 2024-10-08 PROCEDURE — 3046F HEMOGLOBIN A1C LEVEL >9.0%: CPT | Performed by: INTERNAL MEDICINE

## 2024-10-08 PROCEDURE — 1123F ACP DISCUSS/DSCN MKR DOCD: CPT | Performed by: INTERNAL MEDICINE

## 2024-10-08 PROCEDURE — G0008 ADMIN INFLUENZA VIRUS VAC: HCPCS | Performed by: INTERNAL MEDICINE

## 2024-10-08 PROCEDURE — 3049F LDL-C 100-129 MG/DL: CPT | Performed by: INTERNAL MEDICINE

## 2024-10-08 PROCEDURE — 90662 IIV NO PRSV INCREASED AG IM: CPT | Performed by: INTERNAL MEDICINE

## 2024-10-08 PROCEDURE — 3078F DIAST BP <80 MM HG: CPT | Performed by: INTERNAL MEDICINE

## 2024-10-08 PROCEDURE — 99213 OFFICE O/P EST LOW 20 MIN: CPT | Performed by: INTERNAL MEDICINE

## 2024-10-08 PROCEDURE — 3060F POS MICROALBUMINURIA REV: CPT | Performed by: INTERNAL MEDICINE

## 2024-10-08 RX ORDER — DULAGLUTIDE 1.5 MG/.5ML
INJECTION, SOLUTION SUBCUTANEOUS
Qty: 6 ML | Refills: 0 | Status: SHIPPED | OUTPATIENT
Start: 2024-10-08

## 2024-10-08 RX ORDER — DULAGLUTIDE 1.5 MG/.5ML
INJECTION, SOLUTION SUBCUTANEOUS
Qty: 2 ML | Refills: 1 | Status: SHIPPED | OUTPATIENT
Start: 2024-10-08 | End: 2024-10-08

## 2024-10-08 NOTE — PROGRESS NOTES
"Subjective   Patient ID: Zackary Goode is a 69 y.o. male who presents for Follow-up (Post COVID Ed follow up ).    HPI   Interval history noted, with patient stating symptoms came all of a sudden.  He was fine in the morning, then developed chills and fever midday, Tmax 103.5 °F.  Had himself brought to the ER, where a chest x-ray done apparently was negative, but in the meantime, he was found to be COVID POSITIVE, and was recommended to start PAXLOVID.  Patient called me to ask risk-benefit ratio of using Paxlovid.  I told him that for him, it would be beneficial for the most part.  He took the medication without any incident.    Today, aside from fatigue, no other symptoms.  Perhaps dry cough, minimal shortness of breath perhaps, but nothing more specific.  No sonia substernal chest pain, no orthopnea, no paroxysmal nocturnal dyspnea.  No headache, blurred vision, diplopia.  No dysphagia.    In September, he did have dizziness and fell on his left knee, causing contusion.  He says this coincided with his high-grade fever.  He was seen in the ER for this as well, and was found not to have any incident.    Currently, appetite preserved, no abdominal distress.  No diarrhea.  No dysuria, flank, suprapubic pain.  No skin changes.  Also, continues to want to improve quality of life, and does not wish harm to self or others.        Review of Systems  Review of systems as in history of present illness, and otherwise, reviewed separately as well, and was unremarkable/negative/noncontributory.        Objective   /79   Pulse 84   Temp 36.8 °C (98.3 °F) (Oral)   Ht 1.778 m (5' 10\")   Wt 79.9 kg (176 lb 1.6 oz)   SpO2 94%   BMI 25.27 kg/m²     Physical Exam  In good spirits.  Not in distress or diaphoresis.  Alert, oriented x 3.  Amiable.  Not unkempt.  Receptive, cheerful, appropriate, and eager to maintain and hopefully improve quality of life.  Does not wish harm to self or others.  Moderately built.  " Appears to have lost some modest weight.    HEAD pink palpebral conjunctivae, anicteric sclerae.  Mucous membranes moist.  No tonsillopharyngeal congestion, no thrush.  NECK supple, no apparent jugular venous distention.  No carotid bruit.  CARDIOVASCULAR not in distress or diaphoresis.  No bipedal edema.  Regular rate and rhythm.  No murmurs appreciated.  LUNGS not in distress or diaphoresis.  Not using accessory muscles.  Clear to auscultation bilaterally.  ABDOMEN soft, nontender.  BACK no costovertebral angle tenderness.  EXTREMITIES no clubbing, no cyanosis.  NEURO no facial asymmetry.  No apparent cranial nerve deficits.  Romberg negative.  Ambulating without need of assistance.  No apparent focal weakness.  No tremors.  PSYCH receptive, appropriate, and eager to maintain and improve quality of life.        LABORATORY results noted, with azotemia, mild anemia.  Negative chest x-ray.        Assessment/Plan   Diagnoses and all orders for this visit:  Debility  -     Flu vaccine, trivalent, preservative free, HIGH-DOSE, age 65y+ (Fluzone)  -     CBC and Auto Differential; Future  -     Comprehensive Metabolic Panel; Future  -     TSH with reflex to Free T4 if abnormal; Future  -     Urinalysis with Reflex Microscopic; Future  Stage 3a chronic kidney disease (Multi)  -     Flu vaccine, trivalent, preservative free, HIGH-DOSE, age 65y+ (Fluzone)  -     CBC and Auto Differential; Future  -     Comprehensive Metabolic Panel; Future  -     TSH with reflex to Free T4 if abnormal; Future  -     Magnesium; Future  -     Urinalysis with Reflex Microscopic; Future  -     Albumin-Creatinine Ratio, Urine Random; Future  -     Uric Acid; Future  -     Creatine Kinase; Future  Anemia due to vitamin B12 deficiency, unspecified B12 deficiency type  -     Flu vaccine, trivalent, preservative free, HIGH-DOSE, age 65y+ (Fluzone)  -     CBC and Auto Differential; Future  -     Urinalysis with Reflex Microscopic; Future  -      Vitamin B12; Future  -     Folate; Future  -     Ferritin; Future  -     Iron and TIBC; Future  Type 2 diabetes mellitus with hyperglycemia, without long-term current use of insulin  -     Flu vaccine, trivalent, preservative free, HIGH-DOSE, age 65y+ (Fluzone)  -     Trulicity 1.5 mg/0.5 mL pen injector injection; INJECT 0.5 ML SUBCUTANEOUSLY ONE TIME PER WEEK  -     Comprehensive Metabolic Panel; Future  -     Urinalysis with Reflex Microscopic; Future  -     Albumin-Creatinine Ratio, Urine Random; Future  -     Hemoglobin A1C; Future  Essential hypertension  -     Flu vaccine, trivalent, preservative free, HIGH-DOSE, age 65y+ (Fluzone)  -     CBC and Auto Differential; Future  -     Comprehensive Metabolic Panel; Future  -     TSH with reflex to Free T4 if abnormal; Future  -     Magnesium; Future  -     Urinalysis with Reflex Microscopic; Future  -     Creatine Kinase; Future  Pure hypercholesterolemia with target low density lipoprotein (LDL) cholesterol less than 70 mg/dL  -     Flu vaccine, trivalent, preservative free, HIGH-DOSE, age 65y+ (Fluzone)  -     Comprehensive Metabolic Panel; Future  -     Lipid Panel; Future  Microalbuminuria  -     Flu vaccine, trivalent, preservative free, HIGH-DOSE, age 65y+ (Fluzone)  -     CBC and Auto Differential; Future  -     Comprehensive Metabolic Panel; Future  -     Urinalysis with Reflex Microscopic; Future  -     Albumin-Creatinine Ratio, Urine Random; Future  -     Creatine Kinase; Future  Vitamin D deficiency  -     Flu vaccine, trivalent, preservative free, HIGH-DOSE, age 65y+ (Fluzone)  -     Comprehensive Metabolic Panel; Future  -     Vitamin D 25-Hydroxy,Total (for eval of Vitamin D levels); Future       Thank you very much for coming.  I am glad to see you again.    I am glad to hear that initially, you are having symptoms, chills, fever, fatigue, loss of appetite, but all of that is gone!  I am also glad to hear that you took the PAXLOVID and had no issue  with it.    Please continue to take care of yourself.  Lots of rest and sleep.  Lots of fluids throughout the day.  Lots of gentle physical activity, including lots of walking please.  This will help you get back your strength.  Please eat sensibly.  Small frequent meals until you are completely healed.    You will benefit from the INFLUENZA vaccination today, HIGH DOSE.  It may make you a little tired and achy.  Drink lots of fluids throughout the day.  Get lots of rest and sleep.    You are not eligible for the COVID vaccination until at least the middle of December.  You have to wait 90 days from your COVID diagnosis before you should be vaccinated.    Do not worry about any further vaccinations at this time.  Influenza vaccination today, may be COVID in December, and that is it.    Please keep your appointment in January.  Do not forget to do your FASTING laboratory examinations at any  facility, including the [place] facility, as well as the [facility] in [place].  Until then, please continue to take care of yourself and your family, and please continue to pray for our recovery from this pandemic.  Take care and God bless.            0  Recovering from COVID.  No residual symptoms, perhaps fatigue, with fever and resolution.    Some modest anorexia only.    Plans as above.  Patient has appointment in JANUARY for reevaluation of fasting laboratory examinations, hemodynamics, cardiovascular risk, preventive strategies, diabetes control.            0

## 2024-10-08 NOTE — PATIENT INSTRUCTIONS
Thank you very much for coming.  I am glad to see you again.    I am glad to hear that initially, you are having symptoms, chills, fever, fatigue, loss of appetite, but all of that is gone!  I am also glad to hear that you took the PAXLOVID and had no issue with it.    Please continue to take care of yourself.  Lots of rest and sleep.  Lots of fluids throughout the day.  Lots of gentle physical activity, including lots of walking please.  This will help you get back your strength.  Please eat sensibly.  Small frequent meals until you are completely healed.    You will benefit from the INFLUENZA vaccination today, HIGH DOSE.  It may make you a little tired and achy.  Drink lots of fluids throughout the day.  Get lots of rest and sleep.    You are not eligible for the COVID vaccination until at least the middle of December.  You have to wait 90 days from your COVID diagnosis before you should be vaccinated.    Do not worry about any further vaccinations at this time.  Influenza vaccination today, may be COVID in December, and that is it.    Please keep your appointment in January.  Do not forget to do your FASTING laboratory examinations at any  facility, including the [place] facility, as well as the [facility] in [place].  Until then, please continue to take care of yourself and your family, and please continue to pray for our recovery from this pandemic.  Take care and God bless.            0  Recovering from COVID.  No residual symptoms, perhaps fatigue, with fever and resolution.    Some modest anorexia only.    Plans as above.  Patient has appointment in JANUARY for reevaluation of fasting laboratory examinations, hemodynamics, cardiovascular risk, preventive strategies, diabetes control.            0

## 2024-11-22 DIAGNOSIS — E11.65 TYPE 2 DIABETES MELLITUS WITH HYPERGLYCEMIA, WITHOUT LONG-TERM CURRENT USE OF INSULIN: ICD-10-CM

## 2024-11-22 RX ORDER — GLIPIZIDE 10 MG/1
TABLET ORAL
Qty: 90 TABLET | Refills: 0 | Status: SHIPPED | OUTPATIENT
Start: 2024-11-22

## 2024-11-26 DIAGNOSIS — E55.9 VITAMIN D DEFICIENCY: ICD-10-CM

## 2024-11-26 RX ORDER — VIT C/E/ZN/COPPR/LUTEIN/ZEAXAN 250MG-90MG
CAPSULE ORAL
Qty: 90 CAPSULE | Refills: 1 | Status: SHIPPED | OUTPATIENT
Start: 2024-11-26

## 2025-01-09 DIAGNOSIS — E78.00 PURE HYPERCHOLESTEROLEMIA WITH TARGET LOW DENSITY LIPOPROTEIN (LDL) CHOLESTEROL LESS THAN 70 MG/DL: ICD-10-CM

## 2025-01-10 RX ORDER — SIMVASTATIN 10 MG/1
TABLET, FILM COATED ORAL
Qty: 90 TABLET | Refills: 0 | Status: SHIPPED | OUTPATIENT
Start: 2025-01-10

## 2025-01-17 DIAGNOSIS — I10 ESSENTIAL HYPERTENSION: ICD-10-CM

## 2025-01-20 RX ORDER — BISOPROLOL FUMARATE AND HYDROCHLOROTHIAZIDE 5; 6.25 MG/1; MG/1
TABLET ORAL
Qty: 90 TABLET | Refills: 0 | Status: SHIPPED | OUTPATIENT
Start: 2025-01-20

## 2025-01-27 ENCOUNTER — APPOINTMENT (OUTPATIENT)
Dept: PRIMARY CARE | Facility: CLINIC | Age: 70
End: 2025-01-27
Payer: MEDICARE

## 2025-02-14 LAB
25(OH)D3+25(OH)D2 SERPL-MCNC: 42 NG/ML (ref 30–100)
ALBUMIN SERPL-MCNC: 4.7 G/DL (ref 3.6–5.1)
ALBUMIN/CREAT UR: 32 MG/G CREAT
ALP SERPL-CCNC: 55 U/L (ref 35–144)
ALT SERPL-CCNC: 19 U/L (ref 9–46)
ANION GAP SERPL CALCULATED.4IONS-SCNC: 9 MMOL/L (CALC) (ref 7–17)
APPEARANCE UR: CLEAR
AST SERPL-CCNC: 18 U/L (ref 10–35)
BACTERIA #/AREA URNS HPF: ABNORMAL /HPF
BASOPHILS # BLD AUTO: 50 CELLS/UL (ref 0–200)
BASOPHILS NFR BLD AUTO: 0.7 %
BILIRUB SERPL-MCNC: 0.5 MG/DL (ref 0.2–1.2)
BILIRUB UR QL STRIP: NEGATIVE
BUN SERPL-MCNC: 16 MG/DL (ref 7–25)
CALCIUM SERPL-MCNC: 10 MG/DL (ref 8.6–10.3)
CHLORIDE SERPL-SCNC: 101 MMOL/L (ref 98–110)
CHOLEST SERPL-MCNC: 185 MG/DL
CHOLEST/HDLC SERPL: 3 (CALC)
CK SERPL-CCNC: 136 U/L (ref 19–278)
CO2 SERPL-SCNC: 30 MMOL/L (ref 20–32)
COLOR UR: ABNORMAL
CREAT SERPL-MCNC: 1.38 MG/DL (ref 0.7–1.28)
CREAT UR-MCNC: 405 MG/DL (ref 20–320)
EGFRCR SERPLBLD CKD-EPI 2021: 55 ML/MIN/1.73M2
EOSINOPHIL # BLD AUTO: 149 CELLS/UL (ref 15–500)
EOSINOPHIL NFR BLD AUTO: 2.1 %
ERYTHROCYTE [DISTWIDTH] IN BLOOD BY AUTOMATED COUNT: 12.8 % (ref 11–15)
EST. AVERAGE GLUCOSE BLD GHB EST-MCNC: 214 MG/DL
EST. AVERAGE GLUCOSE BLD GHB EST-SCNC: 11.9 MMOL/L
FERRITIN SERPL-MCNC: 33 NG/ML (ref 24–380)
FOLATE SERPL-MCNC: 14.2 NG/ML
GLUCOSE SERPL-MCNC: 206 MG/DL (ref 65–99)
GLUCOSE UR QL STRIP: NEGATIVE
HBA1C MFR BLD: 9.1 % OF TOTAL HGB
HCT VFR BLD AUTO: 45.5 % (ref 38.5–50)
HDLC SERPL-MCNC: 61 MG/DL
HGB BLD-MCNC: 14.9 G/DL (ref 13.2–17.1)
HGB UR QL STRIP: NEGATIVE
HYALINE CASTS #/AREA URNS LPF: ABNORMAL /LPF
IRON SATN MFR SERPL: 23 % (CALC) (ref 20–48)
IRON SERPL-MCNC: 85 MCG/DL (ref 50–180)
KETONES UR QL STRIP: ABNORMAL
LDLC SERPL CALC-MCNC: 105 MG/DL (CALC)
LEUKOCYTE ESTERASE UR QL STRIP: NEGATIVE
LYMPHOCYTES # BLD AUTO: 1321 CELLS/UL (ref 850–3900)
LYMPHOCYTES NFR BLD AUTO: 18.6 %
MAGNESIUM SERPL-MCNC: 1.8 MG/DL (ref 1.5–2.5)
MCH RBC QN AUTO: 30.7 PG (ref 27–33)
MCHC RBC AUTO-ENTMCNC: 32.7 G/DL (ref 32–36)
MCV RBC AUTO: 93.6 FL (ref 80–100)
MICROALBUMIN UR-MCNC: 13.1 MG/DL
MONOCYTES # BLD AUTO: 540 CELLS/UL (ref 200–950)
MONOCYTES NFR BLD AUTO: 7.6 %
NEUTROPHILS # BLD AUTO: 5041 CELLS/UL (ref 1500–7800)
NEUTROPHILS NFR BLD AUTO: 71 %
NITRITE UR QL STRIP: NEGATIVE
NONHDLC SERPL-MCNC: 124 MG/DL (CALC)
PH UR STRIP: 5.5 [PH] (ref 5–8)
PLATELET # BLD AUTO: 213 THOUSAND/UL (ref 140–400)
PMV BLD REES-ECKER: 12 FL (ref 7.5–12.5)
POTASSIUM SERPL-SCNC: 4 MMOL/L (ref 3.5–5.3)
PROT SERPL-MCNC: 7.6 G/DL (ref 6.1–8.1)
PROT UR QL STRIP: ABNORMAL
RBC # BLD AUTO: 4.86 MILLION/UL (ref 4.2–5.8)
RBC #/AREA URNS HPF: ABNORMAL /HPF
SERVICE CMNT-IMP: ABNORMAL
SODIUM SERPL-SCNC: 140 MMOL/L (ref 135–146)
SP GR UR STRIP: 1.03 (ref 1–1.03)
SQUAMOUS #/AREA URNS HPF: ABNORMAL /HPF
TIBC SERPL-MCNC: 366 MCG/DL (CALC) (ref 250–425)
TRIGL SERPL-MCNC: 92 MG/DL
TSH SERPL-ACNC: 2.16 MIU/L (ref 0.4–4.5)
URATE SERPL-MCNC: 6.6 MG/DL (ref 4–8)
VIT B12 SERPL-MCNC: 963 PG/ML (ref 200–1100)
WBC # BLD AUTO: 7.1 THOUSAND/UL (ref 3.8–10.8)
WBC #/AREA URNS HPF: ABNORMAL /HPF

## 2025-02-26 ENCOUNTER — APPOINTMENT (OUTPATIENT)
Dept: PRIMARY CARE | Facility: CLINIC | Age: 70
End: 2025-02-26
Payer: MEDICARE

## 2025-02-26 VITALS
WEIGHT: 182.5 LBS | SYSTOLIC BLOOD PRESSURE: 152 MMHG | DIASTOLIC BLOOD PRESSURE: 80 MMHG | BODY MASS INDEX: 26.13 KG/M2 | HEART RATE: 75 BPM | OXYGEN SATURATION: 96 % | HEIGHT: 70 IN

## 2025-02-26 DIAGNOSIS — Z28.21 COVID-19 VACCINATION DECLINED: ICD-10-CM

## 2025-02-26 DIAGNOSIS — E78.00 PURE HYPERCHOLESTEROLEMIA WITH TARGET LOW DENSITY LIPOPROTEIN (LDL) CHOLESTEROL LESS THAN 70 MG/DL: ICD-10-CM

## 2025-02-26 DIAGNOSIS — E11.65 TYPE 2 DIABETES MELLITUS WITH HYPERGLYCEMIA, WITHOUT LONG-TERM CURRENT USE OF INSULIN: ICD-10-CM

## 2025-02-26 DIAGNOSIS — H90.3 SENSORINEURAL HEARING LOSS (SNHL) OF BOTH EARS: ICD-10-CM

## 2025-02-26 DIAGNOSIS — D51.9 ANEMIA DUE TO VITAMIN B12 DEFICIENCY, UNSPECIFIED B12 DEFICIENCY TYPE: ICD-10-CM

## 2025-02-26 DIAGNOSIS — R80.9 MICROALBUMINURIA: ICD-10-CM

## 2025-02-26 DIAGNOSIS — E55.9 VITAMIN D DEFICIENCY: ICD-10-CM

## 2025-02-26 DIAGNOSIS — Z91.89 FRAMINGHAM CARDIAC RISK >20% IN NEXT 10 YEARS: Chronic | ICD-10-CM

## 2025-02-26 DIAGNOSIS — I10 ESSENTIAL HYPERTENSION: ICD-10-CM

## 2025-02-26 DIAGNOSIS — N18.31 STAGE 3A CHRONIC KIDNEY DISEASE (MULTI): ICD-10-CM

## 2025-02-26 PROCEDURE — 1159F MED LIST DOCD IN RCRD: CPT | Performed by: INTERNAL MEDICINE

## 2025-02-26 PROCEDURE — 99214 OFFICE O/P EST MOD 30 MIN: CPT | Performed by: INTERNAL MEDICINE

## 2025-02-26 PROCEDURE — 4010F ACE/ARB THERAPY RXD/TAKEN: CPT | Performed by: INTERNAL MEDICINE

## 2025-02-26 PROCEDURE — 3077F SYST BP >= 140 MM HG: CPT | Performed by: INTERNAL MEDICINE

## 2025-02-26 PROCEDURE — 3008F BODY MASS INDEX DOCD: CPT | Performed by: INTERNAL MEDICINE

## 2025-02-26 PROCEDURE — 1036F TOBACCO NON-USER: CPT | Performed by: INTERNAL MEDICINE

## 2025-02-26 PROCEDURE — 1160F RVW MEDS BY RX/DR IN RCRD: CPT | Performed by: INTERNAL MEDICINE

## 2025-02-26 PROCEDURE — G2211 COMPLEX E/M VISIT ADD ON: HCPCS | Performed by: INTERNAL MEDICINE

## 2025-02-26 PROCEDURE — 1123F ACP DISCUSS/DSCN MKR DOCD: CPT | Performed by: INTERNAL MEDICINE

## 2025-02-26 PROCEDURE — 3079F DIAST BP 80-89 MM HG: CPT | Performed by: INTERNAL MEDICINE

## 2025-02-26 RX ORDER — DAPAGLIFLOZIN 5 MG/1
TABLET, FILM COATED ORAL
Qty: 28 TABLET | Refills: 0 | Status: SHIPPED | OUTPATIENT
Start: 2025-02-26

## 2025-02-26 RX ORDER — LOSARTAN POTASSIUM 25 MG/1
25 TABLET ORAL DAILY
Qty: 100 TABLET | Refills: 0 | Status: SHIPPED | OUTPATIENT
Start: 2025-02-26 | End: 2026-04-02

## 2025-02-26 NOTE — PATIENT INSTRUCTIONS
Thank you very much for coming.  It is nice to see you.    Thank you for keeping up with your medications, including simvastatin, glipizide, Trulicity, and bisoprolol/hydrochlorothiazide.  We will make the necessary adjustments to have better control of your blood pressure and blood sugar.    Please use the FARXIGA 10 mg samples, and take ONLY HALF tablet by mouth with BREAKFAST.  Please drink lots of fluids throughout the day.  Farxiga will be good not only for blood sugar, but can also be good for your kidneys.    Please avoid excessive salt.    Please stay physically active, especially with brisk walking and other aerobic activities.    Please eat sensibly.  Let me know if you change your mind about seeing a dietitian.  Get yourself a copy of the [place] diet book, DASH diet, or Mediterranean diet for ideas.    Please check your blood sugar before breakfast most mornings.  Save the numbers, so that we can review them when you return in 3 weeks.    ALSO, please add LOSARTAN 25 mg to your regimen.  Take 1 tablet by mouth with supper every evening.  Losartan is good for blood pressure control in patients with diabetes.    Please come back in 3 weeks.  Repeat some NONFASTING blood examination, any  facility, then see me soon after, so that we can review the results together.    Until then, please continue to take care of yourself and your family, and please continue to pray for our recovery from this pandemic.    If you get a chance, get yourself vaccinated for TETANUS.  Highly recommended by Medicare, to be paid for by your insurance.  Very dependable protection against tetanus, diphtheria, pertussis, and good for 10 years!  You can get this from your local friendly pharmacy.  Please get this done before you return in 3 weeks.    Again, thank you very much for coming.  Take care and God bless.            0  Return in 3 weeks.  20 minutes please.  Repeat nonfasting laboratory examination, then recheck blood  pressure, blood sugar response.  Check kidney function.  If time will allow, Medicare Wellness questionnaire.            0

## 2025-03-19 DIAGNOSIS — N18.31 STAGE 3A CHRONIC KIDNEY DISEASE (MULTI): ICD-10-CM

## 2025-03-19 DIAGNOSIS — I10 ESSENTIAL HYPERTENSION: ICD-10-CM

## 2025-03-19 DIAGNOSIS — R80.9 MICROALBUMINURIA: ICD-10-CM

## 2025-03-19 DIAGNOSIS — E11.65 TYPE 2 DIABETES MELLITUS WITH HYPERGLYCEMIA, WITHOUT LONG-TERM CURRENT USE OF INSULIN: ICD-10-CM

## 2025-03-20 LAB
ANION GAP SERPL CALCULATED.4IONS-SCNC: 11 MMOL/L (CALC) (ref 7–17)
BUN SERPL-MCNC: 28 MG/DL (ref 7–25)
BUN/CREAT SERPL: 16 (CALC) (ref 6–22)
CALCIUM SERPL-MCNC: 9.3 MG/DL (ref 8.6–10.3)
CHLORIDE SERPL-SCNC: 104 MMOL/L (ref 98–110)
CO2 SERPL-SCNC: 23 MMOL/L (ref 20–32)
CREAT SERPL-MCNC: 1.79 MG/DL (ref 0.7–1.28)
EGFRCR SERPLBLD CKD-EPI 2021: 40 ML/MIN/1.73M2
GLUCOSE SERPL-MCNC: 234 MG/DL (ref 65–99)
POTASSIUM SERPL-SCNC: 4.5 MMOL/L (ref 3.5–5.3)
SODIUM SERPL-SCNC: 138 MMOL/L (ref 135–146)

## 2025-03-26 ENCOUNTER — APPOINTMENT (OUTPATIENT)
Dept: PRIMARY CARE | Facility: CLINIC | Age: 70
End: 2025-03-26
Payer: MEDICARE

## 2025-03-26 ASSESSMENT — ENCOUNTER SYMPTOMS
WEIGHT LOSS: 0
FATIGUE: 0
WEAKNESS: 0
CONFUSION: 0
HUNGER: 0
TREMORS: 0
BLACKOUTS: 0
DIZZINESS: 0
HEADACHES: 0
NERVOUS/ANXIOUS: 0
BLURRED VISION: 0
POLYPHAGIA: 0
SWEATS: 0
SEIZURES: 0
VISUAL CHANGE: 0
POLYDIPSIA: 0
SPEECH DIFFICULTY: 0

## 2025-04-03 DIAGNOSIS — I10 ESSENTIAL HYPERTENSION: ICD-10-CM

## 2025-04-03 DIAGNOSIS — E11.65 TYPE 2 DIABETES MELLITUS WITH HYPERGLYCEMIA, WITHOUT LONG-TERM CURRENT USE OF INSULIN: ICD-10-CM

## 2025-04-03 DIAGNOSIS — E78.00 PURE HYPERCHOLESTEROLEMIA WITH TARGET LOW DENSITY LIPOPROTEIN (LDL) CHOLESTEROL LESS THAN 70 MG/DL: ICD-10-CM

## 2025-04-03 DIAGNOSIS — N18.31 STAGE 3A CHRONIC KIDNEY DISEASE (MULTI): ICD-10-CM

## 2025-04-07 RX ORDER — BISOPROLOL FUMARATE AND HYDROCHLOROTHIAZIDE 5; 6.25 MG/1; MG/1
TABLET ORAL
Qty: 90 TABLET | Refills: 0 | Status: SHIPPED | OUTPATIENT
Start: 2025-04-07

## 2025-04-07 RX ORDER — LOSARTAN POTASSIUM 25 MG/1
25 TABLET ORAL DAILY
Qty: 90 TABLET | Refills: 0 | Status: SHIPPED | OUTPATIENT
Start: 2025-04-07

## 2025-04-07 RX ORDER — SIMVASTATIN 10 MG/1
TABLET, FILM COATED ORAL
Qty: 90 TABLET | Refills: 0 | Status: SHIPPED | OUTPATIENT
Start: 2025-04-07

## 2025-04-07 RX ORDER — GLIPIZIDE 10 MG/1
TABLET ORAL
Qty: 90 TABLET | Refills: 0 | Status: SHIPPED | OUTPATIENT
Start: 2025-04-07

## 2025-04-07 RX ORDER — DAPAGLIFLOZIN 5 MG/1
TABLET, FILM COATED ORAL
Qty: 28 TABLET | Refills: 0 | OUTPATIENT
Start: 2025-04-07

## 2025-04-07 RX ORDER — DULAGLUTIDE 1.5 MG/.5ML
INJECTION, SOLUTION SUBCUTANEOUS
Qty: 2 ML | Refills: 1 | Status: SHIPPED | OUTPATIENT
Start: 2025-04-07

## 2025-04-08 ENCOUNTER — APPOINTMENT (OUTPATIENT)
Dept: PRIMARY CARE | Facility: CLINIC | Age: 70
End: 2025-04-08
Payer: MEDICARE

## 2025-04-08 VITALS
HEART RATE: 75 BPM | OXYGEN SATURATION: 95 % | WEIGHT: 176 LBS | BODY MASS INDEX: 25.2 KG/M2 | SYSTOLIC BLOOD PRESSURE: 135 MMHG | HEIGHT: 70 IN | DIASTOLIC BLOOD PRESSURE: 77 MMHG

## 2025-04-08 DIAGNOSIS — D51.9 ANEMIA DUE TO VITAMIN B12 DEFICIENCY, UNSPECIFIED B12 DEFICIENCY TYPE: ICD-10-CM

## 2025-04-08 DIAGNOSIS — Z91.89 FRAMINGHAM CARDIAC RISK >20% IN NEXT 10 YEARS: Chronic | ICD-10-CM

## 2025-04-08 DIAGNOSIS — E11.65 TYPE 2 DIABETES MELLITUS WITH HYPERGLYCEMIA, WITHOUT LONG-TERM CURRENT USE OF INSULIN: Primary | ICD-10-CM

## 2025-04-08 DIAGNOSIS — E55.9 VITAMIN D DEFICIENCY: ICD-10-CM

## 2025-04-08 DIAGNOSIS — Z28.21 COVID-19 VACCINATION DECLINED: ICD-10-CM

## 2025-04-08 DIAGNOSIS — E78.00 PURE HYPERCHOLESTEROLEMIA WITH TARGET LOW DENSITY LIPOPROTEIN (LDL) CHOLESTEROL LESS THAN 70 MG/DL: ICD-10-CM

## 2025-04-08 DIAGNOSIS — E11.65 UNCONTROLLED TYPE 2 DIABETES MELLITUS WITH HYPERGLYCEMIA: ICD-10-CM

## 2025-04-08 DIAGNOSIS — N18.31 STAGE 3A CHRONIC KIDNEY DISEASE (MULTI): ICD-10-CM

## 2025-04-08 DIAGNOSIS — R80.9 MICROALBUMINURIA: ICD-10-CM

## 2025-04-08 DIAGNOSIS — H90.3 SENSORINEURAL HEARING LOSS (SNHL) OF BOTH EARS: ICD-10-CM

## 2025-04-08 DIAGNOSIS — I10 ESSENTIAL HYPERTENSION: ICD-10-CM

## 2025-04-08 DIAGNOSIS — M54.42 ACUTE LEFT-SIDED LOW BACK PAIN WITH LEFT-SIDED SCIATICA: ICD-10-CM

## 2025-04-08 PROCEDURE — 3078F DIAST BP <80 MM HG: CPT | Performed by: INTERNAL MEDICINE

## 2025-04-08 PROCEDURE — 3075F SYST BP GE 130 - 139MM HG: CPT | Performed by: INTERNAL MEDICINE

## 2025-04-08 PROCEDURE — 96372 THER/PROPH/DIAG INJ SC/IM: CPT | Performed by: INTERNAL MEDICINE

## 2025-04-08 PROCEDURE — 1159F MED LIST DOCD IN RCRD: CPT | Performed by: INTERNAL MEDICINE

## 2025-04-08 PROCEDURE — 1160F RVW MEDS BY RX/DR IN RCRD: CPT | Performed by: INTERNAL MEDICINE

## 2025-04-08 PROCEDURE — 1123F ACP DISCUSS/DSCN MKR DOCD: CPT | Performed by: INTERNAL MEDICINE

## 2025-04-08 PROCEDURE — 99214 OFFICE O/P EST MOD 30 MIN: CPT | Performed by: INTERNAL MEDICINE

## 2025-04-08 PROCEDURE — 4010F ACE/ARB THERAPY RXD/TAKEN: CPT | Performed by: INTERNAL MEDICINE

## 2025-04-08 PROCEDURE — 3008F BODY MASS INDEX DOCD: CPT | Performed by: INTERNAL MEDICINE

## 2025-04-08 PROCEDURE — 1036F TOBACCO NON-USER: CPT | Performed by: INTERNAL MEDICINE

## 2025-04-08 RX ORDER — BLOOD-GLUCOSE SENSOR
EACH MISCELLANEOUS
Qty: 3 EACH | Refills: 5 | Status: SHIPPED | OUTPATIENT
Start: 2025-04-08

## 2025-04-08 RX ORDER — DAPAGLIFLOZIN 10 MG/1
10 TABLET, FILM COATED ORAL
Qty: 30 TABLET | Refills: 0 | Status: SHIPPED | OUTPATIENT
Start: 2025-04-08 | End: 2025-05-08

## 2025-04-08 RX ORDER — TIZANIDINE 2 MG/1
TABLET ORAL
Qty: 30 TABLET | Refills: 0 | Status: SHIPPED | OUTPATIENT
Start: 2025-04-08

## 2025-04-08 RX ORDER — PREDNISONE 10 MG/1
TABLET ORAL
Qty: 10 TABLET | Refills: 0 | Status: SHIPPED | OUTPATIENT
Start: 2025-04-08

## 2025-04-08 RX ORDER — BLOOD-GLUCOSE,RECEIVER,CONT
EACH MISCELLANEOUS
Qty: 1 EACH | Refills: 0 | Status: SHIPPED | OUTPATIENT
Start: 2025-04-08

## 2025-04-08 RX ORDER — METHYLPREDNISOLONE ACETATE 40 MG/ML
20 INJECTION, SUSPENSION INTRA-ARTICULAR; INTRALESIONAL; INTRAMUSCULAR; SOFT TISSUE ONCE
Status: COMPLETED | OUTPATIENT
Start: 2025-04-08 | End: 2025-04-08

## 2025-04-08 RX ADMIN — METHYLPREDNISOLONE ACETATE 20 MG: 40 INJECTION, SUSPENSION INTRA-ARTICULAR; INTRALESIONAL; INTRAMUSCULAR; SOFT TISSUE at 10:30

## 2025-04-08 ASSESSMENT — ENCOUNTER SYMPTOMS
SEIZURES: 0
VISUAL CHANGE: 0
FATIGUE: 0
HUNGER: 0
WEIGHT LOSS: 0
POLYPHAGIA: 0
SWEATS: 0
BLURRED VISION: 0
HEADACHES: 0
WEAKNESS: 0
TREMORS: 0
NERVOUS/ANXIOUS: 0
BLACKOUTS: 0
DIZZINESS: 0
CONFUSION: 0
POLYDIPSIA: 0
SPEECH DIFFICULTY: 0

## 2025-04-08 NOTE — PATIENT INSTRUCTIONS
Thank you very much for coming back.  It is nice to see you.    After starting out with your medications, your kidneys are stable but guarded.  You will need to continue to drink lots of fluids throughout the day, and you will need to avoid salt.  Thank you for your efforts.    You will also benefit from seeing a KIDNEY SPECIALIST to make sure that your kidneys remain healthy.    Please come back in 6 weeks as well.  I would like to repeat some NONFASTING BLOOD examinations to make sure that you continue to do well with your current changes.    You will also benefit from seeing a DIABETES SPECIALIST, but that may take time.  In the meantime, I will take care of you.    Please continue taking your GLIPIZIDE every day.  Please do not forget.  Also, please continue using your TRULICITY every day.  I was considering increasing the dose, but let us wait a little bit for now.  Please continue taking your FARXIGA.  We are increasing your dosage to 10 mg.  I have also given you samples while we wait for your insurance to say okay to your medications.    I have also requested for a CONTINUOUS GLUCOSE MONITORING system.  If your insurance says yes, please bring your sensor and  here in the office, and we will show you how to use it.  Again, if your insurance says yes to the DEXCOM system, get your materials, and bring them here, and we will show them how to use the sensor and the .  Very helpful, it will show you what your sugars are in real-time!    It is very important that you eat sensibly.  [employer] diet well, DASH diet, Mediterranean diet for ideas.  If needed, I can refer you to a NUTRITIONIST.    Also, stay physically active so that you can increase the efficiency of your system to control your blood sugar and blood pressure.  Brisk walking is the best exercise for you.    I do understand that you are now DEBILITATED because of your BACK PAIN!  You have had SCIATICA on the right side in the past.   Now, it looks like you have sciatica in the LEFT!    X-ray of your back today.  You can have this done at the  facility close to home in [place].    Also, PHYSICAL THERAPY soon please.    I will also refer you to Dr. Salazar, ORTHOPEDICS, for further evaluation, especially since you have had sciatica already in the past.    Until then, it is very tricky, but we will do a STEROID injection, only half dose, now.  You will also get started on a STEROID Dosepak with PREDNISONE 10 mg tablets:  Take 1 tablet by mouth with food now,  Then take 2 tablets by mouth with breakfast for 2 days,  Then take 1 tablet by mouth with breakfast until all gone.    Please continue taking TYLENOL EXTRA STRENGTH 500 mg, take 2 tablets by mouth every 6-8 hours as needed for pain, up to 6 tablets in 24 hours.  Tylenol is the safest medication for you to take.  You can take this with or without food.  You can take this with your other medications to safely.  Tylenol will not hurt your kidneys.    You will also have a MUSCLE RELAXANT TIZANIDINE 2 mg.  Take 1 tablet by mouth every 6-8 hours as needed for musculoskeletal pain.  This medication may make you sleepy, and so watch out for excessive sleepiness.  No driving if drowsy.  Never take with alcohol.  In fact, abstain from alcohol until I see you again in 6 weeks.    Again, thank you very much for coming.  Come back in 6 weeks.  Do some NONFASTING blood examinations once more, so that we can review the results together.    Until then, please continue to take care of yourself and your family, and please continue to pray for our recovery from this pandemic.  I hope you have a happy Easter.            0  Return in 6 weeks.  40 minutes please.  Repeat NONFASTING laboratory examination, then see me soon after.  Reassess hemodynamics, cardiovascular risk, kidney function, blood sugar control.  Possibly coordinate with endocrinology.  Consider referral to nutritionist.  Reassess kidney  function, possibly coordinate with nephrology if already seen.  Coordinate with orthopedics, physical therapy, reassess debility, back pain, history of sciatica bilaterally.  Reassess mood, energy, function, hearing, Medicare Wellness evaluation.            0

## 2025-04-08 NOTE — PROGRESS NOTES
Subjective   Patient ID: Zackary Goode is a 69 y.o. male who presents for Follow-up (Patient presented today for a 3 week follow up./).    Interval history of present illness, as well as review of systems below per patient:  Diabetes  He has type 2 diabetes mellitus. No MedicAlert identification noted. Pertinent negatives for hypoglycemia include no confusion, dizziness, headaches, hunger, mood changes, nervousness/anxiousness, pallor, seizures, sleepiness, speech difficulty, sweats or tremors. Pertinent negatives for diabetes include no blurred vision, no chest pain, no fatigue, no foot paresthesias, no foot ulcerations, no polydipsia, no polyphagia, no polyuria, no visual change, no weakness and no weight loss. Pertinent negatives for hypoglycemia complications include no blackouts, no hospitalization, no nocturnal hypoglycemia, no required assistance and no required glucagon injection. Symptoms are stable. Pertinent negatives for diabetic complications include no CVA, heart disease, impotence, nephropathy, peripheral neuropathy, PVD or retinopathy. Risk factors for coronary artery disease include hypertension. Current diabetic treatment includes oral agent (dual therapy). He is compliant with treatment most of the time. He is currently taking insulin pre-breakfast. Insulin injections are given by patient. Rotation sites for injection include the abdominal wall. His weight is stable. He is following a generally healthy diet. When asked about meal planning, he reported none. He has not had a previous visit with a dietitian. He participates in exercise intermittently. He monitors blood glucose at home 1-2 x per week. He monitors urine at home <1 x per month. Blood glucose monitoring compliance is fair. His home blood glucose trend is fluctuating dramatically. His breakfast blood glucose is taken between 8-9 am. His breakfast blood glucose range is generally 140-180 mg/dl. His lunch blood glucose is taken  "between 11-12 pm. His lunch blood glucose range is generally 130-140 mg/dl. His dinner blood glucose is taken between 4-5 pm. His dinner blood glucose range is generally 110-130 mg/dl. His bedtime blood glucose is taken between 9-10 pm. He does not see a podiatrist.Eye exam is current.        Review of Systems   Constitutional:  Negative for fatigue and weight loss.   Eyes:  Negative for blurred vision.   Cardiovascular:  Negative for chest pain.   Endocrine: Negative for polydipsia, polyphagia and polyuria.   Genitourinary:  Negative for impotence.   Skin:  Negative for pallor.   Neurological:  Negative for dizziness, tremors, seizures, speech difficulty, weakness and headaches.   Psychiatric/Behavioral:  Negative for confusion. The patient is not nervous/anxious.            Objective   /77 (BP Location: Left arm, Patient Position: Sitting, BP Cuff Size: Adult)   Pulse 75   Ht 1.778 m (5' 10\")   Wt 79.8 kg (176 lb)   SpO2 95%   BMI 25.25 kg/m²     Physical Exam  Mildly in distress, but not diaphoretic.  Remaining alert, oriented x 3.  Amiable.  Not unkempt.  Moderately built.  Eager to improve quality of life.  Does not wish harm to self or others.  Appropriate.  Still able to ambulate without need of assistive device.  Back brace in place.    HEAD pink palpebral conjunctivae, anicteric sclerae.  NECK supple, no apparent jugular venous distention.  CARDIOVASCULAR not in distress or diaphoresis.  No bipedal edema.  LUNGS not in distress or diaphoresis.  Not using accessory muscles.  ABDOMEN soft, nontender.  BACK no costovertebral angle tenderness.  EXTREMITIES no clubbing, no cyanosis.  NEURO no facial asymmetry.  No apparent cranial nerve deficits.  Ambulating without need of assistance, but somewhat favoring left side.  Otherwise, no apparent focal weakness.  No tremors.  PSYCH receptive, appropriate, and eager to maintain and improve quality of life.        LABORATORY results, interval history noted.  " Some worsening of renal insufficiency perhaps.  Electrolytes within normal limits.        Assessment/Plan   Diagnoses and all orders for this visit:  Type 2 diabetes mellitus with hyperglycemia, without long-term current use of insulin  -     Follow Up In Primary Care - Established  -     Referral to Endocrinology; Future  -     Referral to Nephrology; Future  -     dapagliflozin propanediol (Farxiga) 10 mg tablet; Take 1 tablet (10 mg) by mouth once daily with breakfast.  -     Dexcom G7 Sensor device; Please apply on the back of the upper arm after wiping cleaned with alcohol.  Replace every 10 days.  Alternate use of each arm.  Thank you.  -     Dexcom G7  misc; Use as instructed to monitor blood sugar readings using the Dexcom system.  Thank you.  -     Comprehensive Metabolic Panel; Future  -     Hemoglobin A1C; Future  -     Follow Up In Primary Care - Established; Future  Uncontrolled type 2 diabetes mellitus with hyperglycemia  -     Referral to Endocrinology; Future  -     dapagliflozin propanediol (Farxiga) 10 mg tablet; Take 1 tablet (10 mg) by mouth once daily with breakfast.  -     Dexcom G7 Sensor device; Please apply on the back of the upper arm after wiping cleaned with alcohol.  Replace every 10 days.  Alternate use of each arm.  Thank you.  -     Dexcom G7  misc; Use as instructed to monitor blood sugar readings using the Dexcom system.  Thank you.  -     Comprehensive Metabolic Panel; Future  -     Hemoglobin A1C; Future  -     Follow Up In Primary Care - Established; Future  Acute left-sided low back pain with left-sided sciatica  -     methylPREDNISolone acetate (DEPO-Medrol) injection 20 mg  -     predniSONE (Deltasone) 10 mg tablet; Please take 1 tablet with food now, then take 2 tablets by mouth with breakfast for 2 days, then take 1 tablet with breakfast until all gone.  Thank you.  -     tiZANidine (Zanaflex) 2 mg tablet; Please take 1 tablet by mouth every 6-8 hours as  needed for musculoskeletal pain.  Watch out for sleepiness.  No driving if drowsy.  Never take with alcohol.  Thank you.  -     XR lumbar spine 2-3 views; Future  -     Referral to Orthopedics and Sports Medicine; Future  -     Referral to Physical Therapy; Future  -     Follow Up In Primary Care - Established; Future  Stage 3a chronic kidney disease (Multi)  -     Referral to Nephrology; Future  -     dapagliflozin propanediol (Farxiga) 10 mg tablet; Take 1 tablet (10 mg) by mouth once daily with breakfast.  -     Comprehensive Metabolic Panel; Future  -     Follow Up In Primary Care - Established; Future  Essential hypertension  -     Comprehensive Metabolic Panel; Future  -     Follow Up In Primary Care - Established; Future  Pure hypercholesterolemia with target low density lipoprotein (LDL) cholesterol less than 70 mg/dL  -     Comprehensive Metabolic Panel; Future  -     Follow Up In Primary Care - Established; Future  [place] cardiac risk >20% in next 10 years  Comments:  39.8% 08/2024  Orders:  -     Comprehensive Metabolic Panel; Future  -     Hemoglobin A1C; Future  -     Follow Up In Primary Care - Established; Future  Microalbuminuria  -     Comprehensive Metabolic Panel; Future  -     Follow Up In Primary Care - Established; Future  Anemia due to vitamin B12 deficiency, unspecified B12 deficiency type  -     Follow Up In Primary Care - Established; Future  Vitamin D deficiency  -     Comprehensive Metabolic Panel; Future  -     Follow Up In Primary Care - Established; Future  Sensorineural hearing loss (SNHL) of both ears  -     Follow Up In Primary Care - Established; Future  COVID-19 vaccination declined  -     Follow Up In Primary Care - Established; Future       Thank you very much for coming back.  It is nice to see you.    After starting out with your medications, your kidneys are stable but guarded.  You will need to continue to drink lots of fluids throughout the day, and you will need to  avoid salt.  Thank you for your efforts.    You will also benefit from seeing a KIDNEY SPECIALIST to make sure that your kidneys remain healthy.    Please come back in 6 weeks as well.  I would like to repeat some NONFASTING BLOOD examinations to make sure that you continue to do well with your current changes.    You will also benefit from seeing a DIABETES SPECIALIST, but that may take time.  In the meantime, I will take care of you.    Please continue taking your GLIPIZIDE every day.  Please do not forget.  Also, please continue using your TRULICITY every day.  I was considering increasing the dose, but let us wait a little bit for now.  Please continue taking your FARXIGA.  We are increasing your dosage to 10 mg.  I have also given you samples while we wait for your insurance to say okay to your medications.    I have also requested for a CONTINUOUS GLUCOSE MONITORING system.  If your insurance says yes, please bring your sensor and  here in the office, and we will show you how to use it.  Again, if your insurance says yes to the DEXCOM system, get your materials, and bring them here, and we will show them how to use the sensor and the .  Very helpful, it will show you what your sugars are in real-time!    It is very important that you eat sensibly.  [email or web address] diet well, DASH diet, Mediterranean diet for ideas.  If needed, I can refer you to a NUTRITIONIST.    Also, stay physically active so that you can increase the efficiency of your system to control your blood sugar and blood pressure.  Brisk walking is the best exercise for you.    I do understand that you are now DEBILITATED because of your BACK PAIN!  You have had SCIATICA on the right side in the past.  Now, it looks like you have sciatica in the LEFT!    X-ray of your back today.  You can have this done at the  facility close to home in [place].    Also, PHYSICAL THERAPY soon please.    I will also refer you to Dr. Salazar,  ORTHOPEDICS, for further evaluation, especially since you have had sciatica already in the past.    Until then, it is very tricky, but we will do a STEROID injection, only half dose, now.  You will also get started on a STEROID Dosepak with PREDNISONE 10 mg tablets:  Take 1 tablet by mouth with food now,  Then take 2 tablets by mouth with breakfast for 2 days,  Then take 1 tablet by mouth with breakfast until all gone.    Please continue taking TYLENOL EXTRA STRENGTH 500 mg, take 2 tablets by mouth every 6-8 hours as needed for pain, up to 6 tablets in 24 hours.  Tylenol is the safest medication for you to take.  You can take this with or without food.  You can take this with your other medications to safely.  Tylenol will not hurt your kidneys.    You will also have a MUSCLE RELAXANT TIZANIDINE 2 mg.  Take 1 tablet by mouth every 6-8 hours as needed for musculoskeletal pain.  This medication may make you sleepy, and so watch out for excessive sleepiness.  No driving if drowsy.  Never take with alcohol.  In fact, abstain from alcohol until I see you again in 6 weeks.    Again, thank you very much for coming.  Come back in 6 weeks.  Do some NONFASTING blood examinations once more, so that we can review the results together.    Until then, please continue to take care of yourself and your family, and please continue to pray for our recovery from this pandemic.  I hope you have a happy Easter.            0  Return in 6 weeks.  40 minutes please.  Repeat NONFASTING laboratory examination, then see me soon after.  Reassess hemodynamics, cardiovascular risk, kidney function, blood sugar control.  Possibly coordinate with endocrinology.  Consider referral to nutritionist.  Reassess kidney function, possibly coordinate with nephrology if already seen.  Coordinate with orthopedics, physical therapy, reassess debility, back pain, history of sciatica bilaterally.  Reassess mood, energy, function, hearing, Medicare Wellness  evaluation.            0

## 2025-04-15 ENCOUNTER — APPOINTMENT (OUTPATIENT)
Dept: PHYSICAL THERAPY | Facility: CLINIC | Age: 70
End: 2025-04-15
Payer: MEDICAID

## 2025-04-16 ENCOUNTER — APPOINTMENT (OUTPATIENT)
Dept: RADIOLOGY | Facility: HOSPITAL | Age: 70
End: 2025-04-16
Payer: MEDICARE

## 2025-04-16 ENCOUNTER — HOSPITAL ENCOUNTER (EMERGENCY)
Facility: HOSPITAL | Age: 70
Discharge: HOME | End: 2025-04-16
Attending: EMERGENCY MEDICINE
Payer: MEDICARE

## 2025-04-16 VITALS
WEIGHT: 176 LBS | HEIGHT: 70 IN | RESPIRATION RATE: 20 BRPM | BODY MASS INDEX: 25.2 KG/M2 | TEMPERATURE: 97.7 F | SYSTOLIC BLOOD PRESSURE: 135 MMHG | DIASTOLIC BLOOD PRESSURE: 75 MMHG | OXYGEN SATURATION: 99 % | HEART RATE: 79 BPM

## 2025-04-16 DIAGNOSIS — R29.6 FALLS: ICD-10-CM

## 2025-04-16 DIAGNOSIS — R29.898 LEFT LEG WEAKNESS: ICD-10-CM

## 2025-04-16 DIAGNOSIS — M54.17 LUMBOSACRAL RADICULOPATHY AT L5: Primary | ICD-10-CM

## 2025-04-16 DIAGNOSIS — S93.402A SPRAIN OF LEFT ANKLE, UNSPECIFIED LIGAMENT, INITIAL ENCOUNTER: ICD-10-CM

## 2025-04-16 PROCEDURE — 99284 EMERGENCY DEPT VISIT MOD MDM: CPT | Performed by: EMERGENCY MEDICINE

## 2025-04-16 PROCEDURE — 2500000001 HC RX 250 WO HCPCS SELF ADMINISTERED DRUGS (ALT 637 FOR MEDICARE OP): Performed by: EMERGENCY MEDICINE

## 2025-04-16 PROCEDURE — 72148 MRI LUMBAR SPINE W/O DYE: CPT | Performed by: RADIOLOGY

## 2025-04-16 PROCEDURE — 2500000004 HC RX 250 GENERAL PHARMACY W/ HCPCS (ALT 636 FOR OP/ED): Performed by: EMERGENCY MEDICINE

## 2025-04-16 PROCEDURE — 72148 MRI LUMBAR SPINE W/O DYE: CPT

## 2025-04-16 RX ORDER — PREDNISONE 20 MG/1
20 TABLET ORAL ONCE
Status: COMPLETED | OUTPATIENT
Start: 2025-04-16 | End: 2025-04-16

## 2025-04-16 RX ORDER — METHYLPREDNISOLONE 4 MG/1
TABLET ORAL
Qty: 21 TABLET | Refills: 0 | Status: SHIPPED | OUTPATIENT
Start: 2025-04-16 | End: 2025-04-22

## 2025-04-16 RX ORDER — ACETAMINOPHEN 325 MG/1
975 TABLET ORAL ONCE
Status: COMPLETED | OUTPATIENT
Start: 2025-04-16 | End: 2025-04-16

## 2025-04-16 RX ADMIN — PREDNISONE 20 MG: 20 TABLET ORAL at 18:28

## 2025-04-16 RX ADMIN — ACETAMINOPHEN 975 MG: 325 TABLET ORAL at 18:28

## 2025-04-16 ASSESSMENT — PAIN SCALES - GENERAL
PAINLEVEL_OUTOF10: 5 - MODERATE PAIN
PAINLEVEL_OUTOF10: 7
PAINLEVEL_OUTOF10: 5 - MODERATE PAIN

## 2025-04-16 ASSESSMENT — COLUMBIA-SUICIDE SEVERITY RATING SCALE - C-SSRS
1. IN THE PAST MONTH, HAVE YOU WISHED YOU WERE DEAD OR WISHED YOU COULD GO TO SLEEP AND NOT WAKE UP?: NO
2. HAVE YOU ACTUALLY HAD ANY THOUGHTS OF KILLING YOURSELF?: NO
6. HAVE YOU EVER DONE ANYTHING, STARTED TO DO ANYTHING, OR PREPARED TO DO ANYTHING TO END YOUR LIFE?: NO

## 2025-04-16 ASSESSMENT — LIFESTYLE VARIABLES
TOTAL SCORE: 0
HAVE PEOPLE ANNOYED YOU BY CRITICIZING YOUR DRINKING: NO
HAVE YOU EVER FELT YOU SHOULD CUT DOWN ON YOUR DRINKING: NO
EVER HAD A DRINK FIRST THING IN THE MORNING TO STEADY YOUR NERVES TO GET RID OF A HANGOVER: NO
EVER FELT BAD OR GUILTY ABOUT YOUR DRINKING: NO

## 2025-04-16 ASSESSMENT — PAIN - FUNCTIONAL ASSESSMENT: PAIN_FUNCTIONAL_ASSESSMENT: 0-10

## 2025-04-16 ASSESSMENT — PAIN DESCRIPTION - PAIN TYPE: TYPE: ACUTE PAIN

## 2025-04-16 ASSESSMENT — PAIN DESCRIPTION - LOCATION: LOCATION: LEG

## 2025-04-16 ASSESSMENT — PAIN DESCRIPTION - ORIENTATION: ORIENTATION: LEFT

## 2025-04-16 ASSESSMENT — PAIN DESCRIPTION - DESCRIPTORS: DESCRIPTORS: ACHING

## 2025-04-16 ASSESSMENT — PAIN DESCRIPTION - FREQUENCY: FREQUENCY: CONSTANT/CONTINUOUS

## 2025-04-16 NOTE — ED PROVIDER NOTES
HPI   Chief Complaint   Patient presents with    Fall     Pt arrived via squad had a fall today c/o back pain and continuing left leg down to ankle pt walks with a cane       Patient is a 69-year-old male who presents today after ground-level fall.  He states that over the past 3 weeks he has been having intermittent falls which he feels is related to weakness in the left leg.  This has been slowly progressive over the past 3 weeks.  He feels that his leg gives out under him.  On 1 instance he injured his knee and had some swelling.  He saw his primary care provider and received a cortisone injection in the knee.  He was scheduled for follow-up for physical therapy.  His left knee seemed to heal and the swelling resolved.  Today he twisted his left ankle.  He does not feel he injured it significantly.  He is able to stand and bear weight.  He feels there is a mild amount of swelling on the lateral ankle.  He denies any other injury today.  Over the past 3 weeks he has felt his left leg has been progressively weaker.  He started using a cane for support.  He has pain intermittently along the anterior thigh and the anterior aspect of the leg going down to the top of the foot.  He denies any pain posteriorly or into the buttock.  He rarely has any back pain.  He denies any electrical sensation, dysesthesia or loss of sensation down the leg.  He denies any discoloration of the leg.  He denies any urinary retention or bowel incontinence.  Denies any saddle anesthesia.  He has not had any imaging of his back.              Patient History   Medical History[1]  Surgical History[2]  Family History[3]  Social History[4]    Physical Exam   ED Triage Vitals [04/16/25 1458]   Temperature Heart Rate Respirations BP   37.1 °C (98.7 °F) 82 16 147/77      Pulse Ox Temp Source Heart Rate Source Patient Position   99 % Temporal Monitor --      BP Location FiO2 (%)     -- --       Physical Exam  Vitals and nursing note reviewed.    Constitutional:       General: He is not in acute distress.     Appearance: Normal appearance. He is well-developed and normal weight. He is not ill-appearing or diaphoretic.   HENT:      Head: Normocephalic and atraumatic.   Eyes:      Conjunctiva/sclera: Conjunctivae normal.   Cardiovascular:      Rate and Rhythm: Normal rate and regular rhythm.      Pulses: Normal pulses.      Heart sounds: No murmur heard.  Pulmonary:      Effort: Pulmonary effort is normal. No respiratory distress.      Breath sounds: Normal breath sounds.   Abdominal:      Palpations: Abdomen is soft.      Tenderness: There is no abdominal tenderness.   Musculoskeletal:         General: No swelling.      Cervical back: Neck supple.      Right lower leg: No edema.      Left lower leg: No edema.      Comments: Left quadricep and anterior tibialis are slightly atrophied compared to the right.  Negative straight leg test bilaterally  No fasciculations    Left knee no effusions.  Negative Mercedes, negative Lachman, no medial or lateral laxity at 0 and 30 degrees    Left ankle with mild lateral swelling.  Negative drawer sign.  No laxity with inversion eversion testing.  No tenderness over the malleoli or fifth metatarsal.  No tenderness over proximal fibula.   Skin:     General: Skin is warm and dry.      Capillary Refill: Capillary refill takes 2 to 3 seconds.   Neurological:      Mental Status: He is alert.      Sensory: Sensory deficit present.      Comments: Left leg psoas bilateral 5/5, quadriceps left 3/5 right 5/5, dorsiflexion left 4/5, right 5/5, EHL extension 4/5 left, right 5/5.  Hamstring 5/5 bilaterally plantarflexion 5/5 bilaterally.    Left patellar reflex absent.  Achilles reflex intact.  Right patellar reflex intact.  Achilles reflex intact    Normal Babinski right, equivocal left  No clonus    Light touch sensation intact throughout all dermatomes of the lower extremity laterally           ED Course & MDM   Diagnoses as of  04/16/25 1813   Lumbosacral radiculopathy at L5   Left leg weakness   Falls   Sprain of left ankle, unspecified ligament, initial encounter - grade 1                 No data recorded     [place] Coma Scale Score: 15 (04/16/25 1504 : Kira Hurst RN)                           Medical Decision Making  Medications  acetaminophen (Tylenol) tablet 975 mg (975 mg oral Given 4/16/25 1828)  predniSONE (Deltasone) tablet 20 mg (20 mg oral Given 4/16/25 1828)     IMPRESSION:  1. Transitional vertebra: The S1 sacral segment is partially  lumbarized, although with a predominantly sacral configuration. Full  details in the report above.  2. Multilevel lumbar spondylosis with mild chronic scoliosis.  3. Severe chronic trefoil canal stenosis at L4-5 level, distorting  the cauda equina.  4. A very small left L5-S1 disc extrusion, with mild caudal extension  down behind S1. This contacts and mildly distorts the traversing left  S1 nerve, severely narrowing the left lateral recess.  5. Multilevel moderate lateral recess stenosis: Bilateral L3-4 and  right L5-S1.  6. Moderate to severe chronic distal left L5-S1 neural foramen  stenosis. Moderate right foramen narrowing at both L4-5 and L5-S1  levels.      Assessment: Patient presents today after a ground-level fall and twisting his ankle.  In regards to the ankle injury I feel this is a grade 1 ankle sprain and the patient is able to bear weight and ambulate.  I feel low likelihood for any underlying fracture and therefore imaging was not performed.  He has no significant ligamentous laxity.  This can be managed conservatively.  He was given an air splint for comfort.    In regards to the left lower extremity weakness and falls.  The patient does have notable weakness in the quadricep and anterior tibialis.  He is lost his reflux and does seem to have a little bit of a foot drop when ambulating.  He has no sensory deficits.  His pain is intermittent but when present is down the  L5 distribution he has no saddle anesthesia, urinary retention or incontinence.  I performed the MRI due to the progressive nature of his symptoms.  His symptoms localized to the canal stenosis at the L4-5 level and also neuroforaminal stenosis in this region as well.  As this is not entirely acute he does not need emergent intervention or decompression.  I will refer him to orthopedic spine.  Steroids at this point may have limited benefit but we will offer them.  Patient is able to ambulate and uses his cane.  I feel he is reasonable candidate to go home.  He was given prescription and referral instructions.  He has physical therapy scheduled next week.    He understood the instructions and left in stable and improved condition.        Procedure  Procedures         [1]   Past Medical History:  Diagnosis Date    Diabetes mellitus (Multi)     Hyperlipidemia     Hypertension    [2] History reviewed. No pertinent surgical history.  [3]   Family History  Problem Relation Name Age of Onset    No Known Problems Mother      No Known Problems Father     [4]   Social History  Tobacco Use    Smoking status: Never    Smokeless tobacco: Never   Substance Use Topics    Alcohol use: Never    Drug use: Never        Christy Sharif MD  04/16/25 3359

## 2025-04-16 NOTE — DISCHARGE INSTRUCTIONS
Use your cane for support.  Ice to ankle 20 minutes 3-4 times a day.  Follow-up with the orthopedic referral for your spine.  Follow-up with physical therapy and alert them of your MRI results  Use Tylenol 975 mg a day for pain

## 2025-04-18 NOTE — PROGRESS NOTES
Physical Therapy  Physical Therapy Evaluation    Patient Name: Zackary Goode  MRN: [id number]  Today's Date: 4/18/2025                          Insurance:  COPAY 0 DED 0  Visit number: 1 of TBD  Authorization info: AUTH REQ  Insurance Type: ANTHEM BOA      General:  Reason for visit: Lumbar Radiculopathy  Referred by: Ap Grover MD     Current Problem:  M54.42ICD-10-CMAcute left-sided low back pain with left-sided sciatica     Precautions: ***         Medical History Form: Reviewed (scanned into chart)    Subjective:   Patient is a 69-year-old male who presents today after ground-level fall. He states that over the past 3 weeks he has been having intermittent falls which he feels is related to weakness in the left leg. This has been slowly progressive over the past 3 weeks. He feels that his leg gives out under him. On 1 instance he injured his knee and had some swelling. He saw his primary care provider and received a cortisone injection in the knee. He was scheduled for follow-up for physical therapy. His left knee seemed to heal and the swelling resolved. Today he twisted his left ankle. He does not feel he injured it significantly. He is able to stand and bear weight. He feels there is a mild amount of swelling on the lateral ankle. He denies any other injury today. Over the past 3 weeks he has felt his left leg has been progressively weaker. He started using a cane for support. He has pain intermittently along the anterior thigh and the anterior aspect of the leg going down to the top of the foot. He denies any pain posteriorly or into the buttock. He rarely has any back pain. He denies any electrical sensation, dysesthesia or loss of sensation down the leg. He denies any discoloration of the leg. He denies any urinary retention or bowel incontinence. Denies any saddle anesthesia. He has not had any imaging of his back. -Encounter note 4/16/24  Chief Complaint: Patient presents to clinic  ***  Onset Date: ***  GONZALO: {GONZALO:91213}    Current Condition:   {Current Condition:66114}    Pain:     Location: ***  Description: ***  Aggravating Factors:  {Aggravating Factors:44237}  Relieving Factors:  {Relieving Factors:74348}    Relevant Information (PMH & Previous Tests/Imaging): ***  Previous Interventions/Treatments: {Previous Interventions/Treatments:66843}    Prior Level of Function (PLOF)  Patient previously independent with all ADLs  Exercise/Physical Activity: ***  Work/School: ***    Patients Living Environment: Reviewed and no concern    Primary Language: ***    Patient's Goal(s) for Therapy: ***    Red Flags: Do you have any of the following? {Yes/No:20036}  Fever/chills, unexplained weight changes, dizziness/fainting, unexplained change in bowel or bladder functions, unexplained malaise or muscle weakness, night pain/sweats, numbness or tingling    Objective:  Slump  Leg Lowering  Hip Scouring    LUMBAR AROM  FF  EX  RSB  LSB    HIP AROM  R ***  L ***    Posture: ***    Lower Extremity Functional Movements  Transfers: ***  Gait: ***    Outcome Measures:  {PT Outcome Measures:37495}      EDUCATION: Pt educated in HEP, PT POC, anatomy, activity avoidance, proper positioning/posture, use of *** , pt demonstrates understanding of PT info, all questions answered.    HEP:    Goals: Set and discussed today  Increase ROM to WFL of LB  Increase Strength where deficits noted by 1/3 to 1 mm grade   Ind w/ HEP for LB to expedite progress and promote goal achievement for pt.    Improve Outcome score for evidence of improved function of LB.  Return to PLOF   Decrease pain to 2/10 or less      Plan of care was developed with input and agreement by the patient.    Treatment Performed:    Therapeutic Exercise:    *** min  ***    Manual Therapy:    *** min  ***        Assessment: 70 y/o M presents with c/o lumbar radiculopathy. Upon examination patient demonstrates *** limiting overall functional mobility including  ***. Activity limitations and participations restrictions include ***. Pt to benefit from outpatient PT to address deficits, maximize functional mobility and improve QOL.  The clinical presentation of this patient is stable and their history and examination findings are consistent with a low complexity evaluation with good rehab potential.            Plan:     Planned Interventions include: therapeutic exercise, self-care home management, manual therapy, therapeutic activities, gait training, neuromuscular coordination, vasopneumatic, dry needling, aquatic therapy  Frequency: 2x/wk  Duration: 4wks

## 2025-04-22 ENCOUNTER — APPOINTMENT (OUTPATIENT)
Dept: PHYSICAL THERAPY | Facility: CLINIC | Age: 70
End: 2025-04-22
Payer: MEDICAID

## 2025-04-28 ENCOUNTER — EVALUATION (OUTPATIENT)
Dept: PHYSICAL THERAPY | Facility: CLINIC | Age: 70
End: 2025-04-28
Payer: MEDICARE

## 2025-04-28 DIAGNOSIS — M54.42 ACUTE LEFT-SIDED LOW BACK PAIN WITH LEFT-SIDED SCIATICA: Primary | ICD-10-CM

## 2025-04-28 PROCEDURE — 97162 PT EVAL MOD COMPLEX 30 MIN: CPT | Mod: GP | Performed by: PHYSICAL THERAPIST

## 2025-04-28 PROCEDURE — 97110 THERAPEUTIC EXERCISES: CPT | Mod: GP | Performed by: PHYSICAL THERAPIST

## 2025-04-28 ASSESSMENT — PATIENT HEALTH QUESTIONNAIRE - PHQ9
SUM OF ALL RESPONSES TO PHQ9 QUESTIONS 1 AND 2: 1
1. LITTLE INTEREST OR PLEASURE IN DOING THINGS: NOT AT ALL
10. IF YOU CHECKED OFF ANY PROBLEMS, HOW DIFFICULT HAVE THESE PROBLEMS MADE IT FOR YOU TO DO YOUR WORK, TAKE CARE OF THINGS AT HOME, OR GET ALONG WITH OTHER PEOPLE: SOMEWHAT DIFFICULT
2. FEELING DOWN, DEPRESSED OR HOPELESS: SEVERAL DAYS

## 2025-04-28 ASSESSMENT — ENCOUNTER SYMPTOMS
OCCASIONAL FEELINGS OF UNSTEADINESS: 1
DEPRESSION: 1
LOSS OF SENSATION IN FEET: 1

## 2025-04-28 NOTE — PROGRESS NOTES
Physical Therapy Evaluation and Treatment      Patient Name: Zackary Goode  MRN: [id number]  Today's Date: 4/28/2025  Time Calculation  Start Time: 0207  Stop Time: 0250  Time Calculation (min): 43 min      PT Evaluation Time Entry  PT Evaluation (Moderate) Time Entry: 25  PT Therapeutic Procedures Time Entry  Therapeutic Exercise Time Entry: 15                   INSURANCE:  Visit number: 1  ISMAEL PENA COPAY 0 DED 0 COVERAGE 100  OOP 9350(283) AUTH REQ# [id number] 1 VISIT 4-22-25 to 5-6-25  REF#[id number]     Referring Provider: Ap Grover MD  Hx: DM, HTN, Kidney Disease    ASSESSMENT:  PT Assessment Results: decreased knowledge of HEP, activity limitations, ADLs/IADLs/self care skills, flexibility, motor function/control/tone, pain, participation restrictions, range of motion/joint mobility, strength, posture, transfers, coordination, balance, gait/locomotion, fall risk, decreased knowledge of assisted device use, decreased knowledge of precautions.  Rehab Prognosis: Good  Evaluation/Treatment Tolerance: Patient tolerated treatment well  Stable and/or uncomplicated characteristics    Zackary Goode is a 69 y.o. male presenting to the clinic with acute lumbar radiculopathy affecting the L LE. Pt demonstrates decreased ROM and strength of the lumbar spine and B hips causing pain and dysfunction with lifting leg, walking, standing, stairs, squatting, lifting, and bending. Pt was given and reviewed HEP. The patient was educated on the importance of positioning, proper posture/use of lumbar roll, and body mechanics with transfers/log roll, the importance of general therapeutic exercise, anatomy, function, & likely cause of impairments. Pt will benefit from skilled PT in order to increase ROM and strength of the lumbar spine and B hips so that the pt can perform ADLs with reduced pain/fall risk and return to PLOF.     PLAN:  Treatments/Interventions: gait training, traction, dry  needling, edema control, education/instruction, home program, self care/home management, manual therapy, neuromuscular re-education, therapeutic activities, therapeutic exercises, modalities, therapeutic elastic taping.   PT Plan: Skilled PT  PT Frequency: 2 times per week  Duration: 12 visits  Onset Date: 03/01/25  Certification Period Start Date: 04/28/25  Certification Period End Date: 07/27/25  Number of Treatments Authorized: Requires Authorization  Rehab Potential: Good  Plan of Care Agreement: Patient    Goals -    STG - After about 6 weeks:  Pt will report less than a 4/10 pain at the worst.  Pt will be able to manage changes in intra-abdominal pressure with appropriate thoracic diaphragm, pelvic, and TA muscle activation during functional activities that cause symptoms.   Pt will increase by 1 MMT grade for B hips in order to aid with completion of ADLs.  Pt will report MDIC with ANÍBAL score.  Pt will have AROM by 20% for the lumbar spine.     LTG - after about 12 weeks:  Pt will report less than a 0-2/10 pain at the worst.  Pt will have at least 4+/5 to 5/5 strength for B hips in order to aid with completion of ADLs.  Pt will have AROM to WFL for the lumbar spine.   Pt will report 50% improvement with ANÍBAL score.  Pt will be independent with progressed HEP.    CURRENT PROBLEM:   1. Acute left-sided low back pain with left-sided sciatica  Follow Up In Physical Therapy          Subjective    General -    Pt reports that he started to have pain down his L LE and difficulty walking starting in March. Pt reports that he feels most of the pain down his leg and into his foot, not too much in the lumbar spine. Pt reports that he has fallen a couple of times. He has been using the cane for a while and also has been using a walker.      MRI IMPRESSION 4/16/25:   1. Transitional vertebra: The S1 sacral segment is partially  lumbarized, although with a predominantly sacral configuration. Full  details in the report  above.  2. Multilevel lumbar spondylosis with mild chronic scoliosis.  3. Severe chronic trefoil canal stenosis at L4-5 level, distorting  the cauda equina.  4. A very small left L5-S1 disc extrusion, with mild caudal extension  down behind S1. This contacts and mildly distorts the traversing left  S1 nerve, severely narrowing the left lateral recess.  5. Multilevel moderate lateral recess stenosis: Bilateral L3-4 and  right L5-S1.  6. Moderate to severe chronic distal left L5-S1 neural foramen  stenosis. Moderate right foramen narrowing at both L4-5 and L5-S1  levels.    Mechanism of Injury -    Insidious Onset    History of Current Episode - 3/1/25    Pain -    Pain Location: L back/hip and knee/LE  Pain Best: 5/10  Pain Today: 6-7/10  Pain Worst: 10/10   Pain Type: Constant, Buckling, Achy/Dull, Stiffness/Tightness, Throbbing, Numbness, Tingling  Pain Exacerbating Factors: lifting leg, walking, standing, stairs, squatting, lifting, and bending.  Pain Relieving Factors: Rest, Tylenol, ice/heat, walking  Difficulty Sleeping: Yes  Night Sweats, Loss of Appetite, Unexpected Weight-loss: No  Saddle/Bowel/Bladder: No    Work -   Work Status: Retired    Home Living -    Stairs into Home: 4 steps with railing  Pt lives by himself.    Patient Stated Goals -    Reduce pain and improved L LE   Be able to walk    PRECAUTIONS -    Fall Risk    Prior Level of Function -    I with ADLs/IADLs     Objective     Hip AROM (degrees) - WFL grossly except IR and extension    Hip MMT (/5) -  R hip Flexion: 4+   R hip ER: 4-   R hip IR: 4   R hip Abduction: 4   R hip Adduction: 4+  L hip Flexion: 4-     L hip ER: 3+   L hip IR: 4-   L hip Abduction: 4-   L hip Adduction: 4    Lumbar AROM -   Lumbar Flexion: 60% with pain  Lumbar Extension: 20%    Posture -   Increased Lumbar Lordosis/APT  Forward Trunk Lean    Functional Assessment/Special Tests -  SAIDA positive bilateral  Avinash positive bilateral    Gait -  Pt is ambulating with an  assistive device (SPC) with a step through pattern.   Noted: L antalgic, decreased heel strike, decreased toe push off, extensor thrust, Trendelenburg, and trunk lean.     Outcome Measures -   Other Measures  Oswestry Disablity Index (ANÍBAL): 29 (/50)     Treatment -   Evaluation -   Moderate (95019)    Therapeutic Exercise (93174) -     Seated Correct Posture/Log Roll: reviewed  Modified Avinash Stretch: 30 sec B  Standing Hip Flexor Stretch at Counter: 30 sec B  MINDI: 2x10    OP Patient Education -   Access Code: [email or web address]  URL: [email or web address]  Date: 04/28/2025  Prepared by: Jasmin Beauchamp    Exercises  - Seated Correct Posture   - Modified Avinash Stretch  - 1-2 x daily - 3 sets - 30 seconds hold  - Supported Hip Flexor Stretch at Counter  - 1-2 x daily - 3 sets - 30 sec hold  - Standing Lumbar Extension with Counter  - 2-3 x daily - 2 sets - 10 reps    Patient Education  - Log Roll

## 2025-04-30 ENCOUNTER — TELEPHONE (OUTPATIENT)
Dept: PHYSICAL THERAPY | Facility: CLINIC | Age: 70
End: 2025-04-30
Payer: MEDICARE

## 2025-04-30 NOTE — TELEPHONE ENCOUNTER
Spoke with patient and he did not want to schedule at this time. He wanted to wait until after his follow up appt with the Dr. He will call us after to schedule.    2x/12 visits, 45 min, Jasmin THOMSON and 1 other therapists   ANTHEM MEDICARE APPROVED  6  PT  VISITS  4-30-25 THRU 6-28-25

## 2025-05-05 DIAGNOSIS — E11.65 TYPE 2 DIABETES MELLITUS WITH HYPERGLYCEMIA, WITHOUT LONG-TERM CURRENT USE OF INSULIN: ICD-10-CM

## 2025-05-05 DIAGNOSIS — E11.65 UNCONTROLLED TYPE 2 DIABETES MELLITUS WITH HYPERGLYCEMIA: ICD-10-CM

## 2025-05-05 DIAGNOSIS — E78.00 PURE HYPERCHOLESTEROLEMIA WITH TARGET LOW DENSITY LIPOPROTEIN (LDL) CHOLESTEROL LESS THAN 70 MG/DL: ICD-10-CM

## 2025-05-05 DIAGNOSIS — N18.31 STAGE 3A CHRONIC KIDNEY DISEASE (MULTI): ICD-10-CM

## 2025-05-05 RX ORDER — DAPAGLIFLOZIN 10 MG/1
10 TABLET, FILM COATED ORAL
Qty: 30 TABLET | Refills: 0 | Status: SHIPPED | OUTPATIENT
Start: 2025-05-05 | End: 2025-06-04

## 2025-05-05 RX ORDER — SIMVASTATIN 10 MG/1
10 TABLET, FILM COATED ORAL NIGHTLY
Qty: 90 TABLET | Refills: 0 | Status: SHIPPED | OUTPATIENT
Start: 2025-05-05

## 2025-05-06 ENCOUNTER — HOSPITAL ENCOUNTER (OUTPATIENT)
Dept: RADIOLOGY | Facility: CLINIC | Age: 70
Discharge: HOME | End: 2025-05-06
Payer: MEDICARE

## 2025-05-06 ENCOUNTER — OFFICE VISIT (OUTPATIENT)
Dept: ORTHOPEDIC SURGERY | Facility: CLINIC | Age: 70
End: 2025-05-06
Payer: MEDICARE

## 2025-05-06 DIAGNOSIS — R29.898 LEFT LEG WEAKNESS: ICD-10-CM

## 2025-05-06 DIAGNOSIS — M54.17 LUMBOSACRAL RADICULOPATHY AT L5: ICD-10-CM

## 2025-05-06 DIAGNOSIS — M54.42 ACUTE LEFT-SIDED LOW BACK PAIN WITH LEFT-SIDED SCIATICA: ICD-10-CM

## 2025-05-06 DIAGNOSIS — M54.16 LUMBAR RADICULOPATHY: Primary | ICD-10-CM

## 2025-05-06 PROCEDURE — 4010F ACE/ARB THERAPY RXD/TAKEN: CPT | Performed by: ORTHOPAEDIC SURGERY

## 2025-05-06 PROCEDURE — 99203 OFFICE O/P NEW LOW 30 MIN: CPT | Performed by: ORTHOPAEDIC SURGERY

## 2025-05-06 PROCEDURE — 72120 X-RAY BEND ONLY L-S SPINE: CPT

## 2025-05-06 PROCEDURE — 99212 OFFICE O/P EST SF 10 MIN: CPT | Performed by: ORTHOPAEDIC SURGERY

## 2025-05-06 PROCEDURE — 1036F TOBACCO NON-USER: CPT | Performed by: ORTHOPAEDIC SURGERY

## 2025-05-06 PROCEDURE — 1159F MED LIST DOCD IN RCRD: CPT | Performed by: ORTHOPAEDIC SURGERY

## 2025-05-06 PROCEDURE — 72110 X-RAY EXAM L-2 SPINE 4/>VWS: CPT | Performed by: ORTHOPAEDIC SURGERY

## 2025-05-06 NOTE — PROGRESS NOTES
Zackary Goode is a 69 y.o. male who presents for New Patient Visit of the Lower Back (Goes into left leg down to his ankle/X-rays today/MRI done at ).    HPI:  69-year-old gentleman here for new patient visit of low back pain and left leg.  He denies any fever chills nausea vomiting night sweats.  He has no bowel or bladder complaints.    Physical exam:  Well-nourished, well kept.No lymphangitis or lymphadenopathy in the examined extremities.  Gait normal.  Can stand on heels and toes with some difficulty if she balances on the counter, he ambulates with a cane.   Examination of the back shows no tenderness in the paraspinous musculature.  There is no decreased range of motion in all directions due to guarding/muscle spasms and pain at extremes.  There is good strength and no instability.  Examination of the lower extremities reveals no point tenderness, swelling, or deformity.  Range of motion of the hips, knees, and ankles are full without crepitance, instability, or exacerbation of pain.  Strength is 5/5 throughout, except left knee flexion and extension is about 4/5, there is weakness in his left ankle dorsiflexion as well 4+/5.  No redness, abrasions, or lesions on extremities  Gross sensation intact in the extremities.  Deep tendon reflexes absent bilateral lower extremity.  Clonus negative.  Affect normal.  Alert and oriented ×3.  Coordination normal.    Imaging studies:  We ordered and reviewed AP lateral flexion-extension plain films of the lumbar spine, these were obtained with the patient laying on the table, it was hard for him to stand for these x-rays.    Assessment:  69-year-old gentleman here for new patient visit of mostly left leg radicular symptoms.  This has been going on about 2 months or so, he had a fall in March and landed on his left knee, that resolved but then he started noticing weakness and pain in the left leg going into the buttock lateral thigh down over the knee into the shin  stopping at the ankle.  He is describing numbness in his foot but no pain.  This is causing him to have multiple falls.  He is not describing any back pain.  He is about to start physical therapy but has no history of physical therapy as of today's visit.  No history of back surgery or injections.  He went to the emergency department April 16, 2025 and was evaluated after a fall, an MRI was ordered.  He has some moderate to severe central stenosis at L4-5 and central stenosis at L5-S1.  There may be a left L5-S1 disc extrusion with caudal migration.  This is severely inhibiting his bodily function, he would like to go through physical therapy first before considering any surgery.    We have ordered and reviewed tests, x-rays, MRI.  I reviewed the note from the emergency department from April 16, 2025.  Basic metabolic panel from March 20, 2025 was reviewed, glucose 234.  This is an undiagnosed new problem that is severely inhibiting his bodily function.    For complete plan and/or surgical details, please refer to Dr. Salazar's portion of this split dictation.    -Nghia Castañeda PA-C    In a face-to-face encounter, I performed a history and physical examination, discussed pertinent diagnostic studies if indicated, and discussed diagnosis and management strategies with both the patient and the midlevel provider.  I reviewed the midlevel's note and agree with the documented findings and plan of care.    Patient with left leg radiculopathy.  Sent by Dr. Grover.  This has been going on for about 6 weeks.  Is getting progressively worse to the point where he has a severe threat to inhibition of bodily function.  I reviewed x-rays and an MRI.  He has loss of lordosis.  He has degenerative changes in the lower lumbar segments.  At L3-4 he has moderate central stenosis at L4-5 there is severe central stenosis with lateral recess stenosis bilaterally.  At L5-S1 there is a herniated disc off to the left causing severe  intraforaminal lateral recess stenosis.  A lot of the stenosis is behind the facet on the left.    Assessment/plan: Patient with left leg radiculopathy for 6 weeks.  I had a long discussion with the patient and explained to them that their options are 1) live with the symptoms and see how they evolve, 2) physical therapy, 3) pain management or 4) surgery.  At this point he wants to try some physical therapy which is very reasonable.  He will do that for a month upstairs.  If he does not do well with that he will come back and see us and then we can talk to him about either trying pain management or surgery.  An operation on him would be an L3-S1 midline laminectomy.  We probably do a TLIF at L5-S1 as we would need to get that facet down on the left in order to decompress that L5 nerve root.    Eleno Salazar MD  Orthopedic surgery

## 2025-05-08 ENCOUNTER — APPOINTMENT (OUTPATIENT)
Dept: ORTHOPEDIC SURGERY | Facility: CLINIC | Age: 70
End: 2025-05-08
Payer: MEDICARE

## 2025-05-08 NOTE — PROGRESS NOTES
Physical Therapy Treatment    Patient Name: Zackary Goode  MRN: [id number]  Today's Date: 5/12/2025  Time Calculation  Start Time: 1146  Stop Time: 1228  Time Calculation (min): 42 min     PT Therapeutic Procedures Time Entry  Therapeutic Exercise Time Entry: 38                 Current Problem  1. Acute left-sided low back pain with left-sided sciatica  Follow Up In Physical Therapy          Insurance:  Visit number: 2/7  ANTHEM BOA COPAY 0 DED 0 COVERAGE 100  OOP 9350(389) AUTH REQ# [id number] 1 VISIT 4-22-25 to 5-6-25  [id number]/ALL    ANTHEM MEDICARE APPROVED  6  PT  VISITS  4-30-25 THRU 6-28-25  AUTH# [id number]   [id number]   Precautions   Fall Risk     MRI IMPRESSION 4/16/25:   1. Transitional vertebra: The S1 sacral segment is partially  lumbarized, although with a predominantly sacral configuration. Full  details in the report above.  2. Multilevel lumbar spondylosis with mild chronic scoliosis.  3. Severe chronic trefoil canal stenosis at L4-5 level, distorting  the cauda equina.  4. A very small left L5-S1 disc extrusion, with mild caudal extension  down behind S1. This contacts and mildly distorts the traversing left  S1 nerve, severely narrowing the left lateral recess.  5. Multilevel moderate lateral recess stenosis: Bilateral L3-4 and  right L5-S1.  6. Moderate to severe chronic distal left L5-S1 neural foramen  stenosis. Moderate right foramen narrowing at both L4-5 and L5-S1  levels.     Subjective   Subjective:   Pt reports that he hasn't fallen since before his first physical therapy session. He saw back MD last week. Pt reports that he has pain in the L thigh and into the knee. Pt states that he tried laying on his stomach for about 10 min, before his knee started to throb more. Pt has numbness in his foot. Pt states that he did a lot of walking yesterday in his house.   Pain   6/10    Objective   Treatments:  Therapeutic Exercise (33947) -     Prone with 1 pillow  underneath hips  3'  Prone with no pillows underneath hips 3'  Seated Correct Posture/Log Roll: reviewed  Lifting technique: reviewed  Modified Avinash Stretch: 30 sec x2 B  Standing Hip Flexor Stretch at Counter: 30 sec B---  MINDI: x10     OP EDUCATION:   Access Code: [email or web address]  URL: [email or web address]  Date: 05/12/2025  Prepared by: Radha Sandoval    Exercises  - Lying Prone with 1 Pillow  - 1 x daily - 7 x weekly - 3 hold  - Lying Prone  - 1 x daily - 7 x weekly - 3 hold      Assessment:   Had pt try prone with a pillow underneath the hips. In this position pt had some knee tingling, but no thigh pain. Then trialed prone with no pillows, where he didn't have pain or tingling. Pt tolerated supine hip flexor stretch well, without pain. Pt somewhat challenged with changing positions on the table. Performed MINDI, with some pain/tightness in low back, but no radicular symptoms.     Plan:   Progress ext based exercises, then core stabilization.

## 2025-05-12 ENCOUNTER — TREATMENT (OUTPATIENT)
Dept: PHYSICAL THERAPY | Facility: CLINIC | Age: 70
End: 2025-05-12
Payer: MEDICARE

## 2025-05-12 DIAGNOSIS — M54.42 ACUTE LEFT-SIDED LOW BACK PAIN WITH LEFT-SIDED SCIATICA: Primary | ICD-10-CM

## 2025-05-12 PROCEDURE — 97110 THERAPEUTIC EXERCISES: CPT | Mod: GP,CQ

## 2025-05-14 NOTE — PROGRESS NOTES
"Physical Therapy Treatment    Patient Name: Zackary Goode  MRN: [id number]  Today's Date: 5/15/2025  Time Calculation  Start Time: 1136  Stop Time: 1216  Time Calculation (min): 40 min     PT Therapeutic Procedures Time Entry  Therapeutic Exercise Time Entry: 38                 Current Problem  1. Acute left-sided low back pain with left-sided sciatica  Follow Up In Physical Therapy          Insurance:  Visit number: 3/7  ANTHEM BOA COPAY 0 DED 0 COVERAGE 100  OOP 9350(283) AUTH REQ# [id number] 1 VISIT 4-22-25 to 5-6-25  REF#[id number]/ALL    ANTHEM MEDICARE APPROVED  6  PT  VISITS  4-30-25 THRU 6-28-25  AUTH# [id number]   [id number]/ALL   Precautions    Fall Risk        Subjective   Subjective:   Pt reports that he currently doesn't have any back or leg pain. He had some pain on Monday until the evening. He does have numbness from the L thigh down to the foot. He has been doing his on the stomach exercise without a pillow.   Pain   0/10    Objective   Treatments:  Therapeutic Exercise (36691) -     Supine iso hip abd w/ belt 5-10\"x10  Supine iso hip add w/ ball 5-10\"x10  Supine diaphramatic breathing 10x  Supine TA bracing 10x 5\"  Supine B UE push down against table 12x 3-5\"  Prone with no pillows underneath hips 3'  Prone prop on elbows 3'  Modified Avinash Stretch: 30 sec  B  MINDI: 2x10  Standing Hip Flexor Stretch at Counter: 30 sec B---  Prone with 1 pillow underneath hips  3'----  Seated Correct Posture/Log Roll: reviewed  Lifting technique: reviewed       OP EDUCATION:     Access Code: [email or web address]  URL: [email or web address]  Date: 05/15/2025  Prepared by: Radha Sandoval    Exercises  - Hooklying Isometric Hip Abduction with Belt  - 1 x daily - 7 x weekly - 10 reps - 5 hold  - Supine Hip Adduction Isometric with Ball  - 1 x daily - 7 x weekly - 10 reps - 5 hold  - Static Prone on Elbows  - 1 x daily - 7 x weekly - 5 sets - 5 hold    Assessment:   Tried introducing gentle " hip/core strengthening ex. Pt seemed to tolerate these well. Tactile cues given with UE push downs to not elevate the hips or the head, which was challenging. Pt felt L anterior thigh pain with hip flexor stretch on the L. Progressed extension forces with elbow prop, which pt tolerated well, however, no position yet decreased numbness in the L LE. Pt moves in stiff manner when negotiating various positions on table, however kept the spine in fairly neutral position.    Plan:   Cont to advance extension based ex, while gradual hip/core strengthening.

## 2025-05-15 ENCOUNTER — TREATMENT (OUTPATIENT)
Dept: PHYSICAL THERAPY | Facility: CLINIC | Age: 70
End: 2025-05-15
Payer: MEDICARE

## 2025-05-15 DIAGNOSIS — M54.42 ACUTE LEFT-SIDED LOW BACK PAIN WITH LEFT-SIDED SCIATICA: Primary | ICD-10-CM

## 2025-05-15 PROCEDURE — 97110 THERAPEUTIC EXERCISES: CPT | Mod: GP,CQ

## 2025-05-15 NOTE — PROGRESS NOTES
"Physical Therapy Treatment    Patient Name: Zackary Goode  MRN: [id number]  Today's Date: 5/19/2025  Time Calculation  Start Time: 1222  Stop Time: 1302  Time Calculation (min): 40 min     PT Therapeutic Procedures Time Entry  Manual Therapy Time Entry: 2  Therapeutic Exercise Time Entry: 38                 Current Problem  1. Acute left-sided low back pain with left-sided sciatica  Follow Up In Physical Therapy          Insurance:  Visit number: 4/7  ANTHEM BOA COPAY 0 DED 0 COVERAGE 100  OOP 9350(283) AUTH REQ# [id number] 1 VISIT 4-22-25 to 5-6-25  REF#[id number]/ALL    ANTHEM MEDICARE APPROVED  6  PT  VISITS  4-30-25 THRU 6-28-25  AUTH# [id number]   [id number]   Precautions   Fall Risk    Subjective   Subjective:   Pt reports that he is just feeling a little tingling in his knee. He feels some muscle spasms in the thigh. He hasn't felt any pain really in the past few days.  Pain   0/10    Objective   Treatments:  Therapeutic Exercise (10495) -     Supine iso hip abd w/ belt 5-10\"x10----  Supine iso hip add w/ ball 5-10\"x10-----  Supine diaphramatic breathing 10x  Supine TA bracing 10x 5\"  Supine B UE push down against table 12x 3-5\"  Supine bridges 10x 3\"  Prone with no pillows underneath hips 3' (with STM to L piriformis)  Prone prop on elbows 3'  Prone press ups 10x2  Modified Avinash Stretch: 30 sec x2 B  Supine piriformis stretch B 20\"x2  MINDI: 2x10  Seated sciatic nerve glide L  20x  Sit to stands 5x    Standing Hip Flexor Stretch at Counter: 30 sec B---  Prone with 1 pillow underneath hips  3'----  Seated Correct Posture/Log Roll: reviewed  Lifting technique: reviewed     PA lumbar mobs 2'     OP EDUCATION:   Access Code: [email or web address]  URL: [email or web address]  Date: 05/19/2025  Prepared by: Radha Sandoval    Exercises  - Prone Press Up  - 6 x daily - 7 x weekly - 15 reps      Assessment:   Pt needed assistance with moving his L LE when on the table during hip flexor " stretch and to extend the knee. Introduced bridges for more extension based ex. Pt didn't have any pain with this. Lacked hip Er for piriformis stretch. Thus some piriformis STM during prone laying.  Also tried PA lumbar mobs, without much felt. Progressed prone ext to press ups. Pt didn't have a lot of lumbar ext with these. Cues given to not allow the hips to raise off of table. Pt unable to do sit to stands without UE, due to L LE weakness.   Plan:   Cont with ext exercises, L LE strengthening

## 2025-05-19 ENCOUNTER — TREATMENT (OUTPATIENT)
Dept: PHYSICAL THERAPY | Facility: CLINIC | Age: 70
End: 2025-05-19
Payer: MEDICARE

## 2025-05-19 DIAGNOSIS — M54.42 ACUTE LEFT-SIDED LOW BACK PAIN WITH LEFT-SIDED SCIATICA: Primary | ICD-10-CM

## 2025-05-19 PROCEDURE — 97110 THERAPEUTIC EXERCISES: CPT | Mod: GP,CQ

## 2025-05-22 ENCOUNTER — APPOINTMENT (OUTPATIENT)
Dept: PHYSICAL THERAPY | Facility: CLINIC | Age: 70
End: 2025-05-22
Payer: MEDICARE

## 2025-05-22 DIAGNOSIS — M54.42 ACUTE LEFT-SIDED LOW BACK PAIN WITH LEFT-SIDED SCIATICA: Primary | ICD-10-CM

## 2025-05-22 NOTE — PROGRESS NOTES
"Physical Therapy Treatment    Patient Name: Zackary Goode  MRN: [id number]  Today's Date: 5/27/2025  Time Calculation  Start Time: 1222  Stop Time: 1305  Time Calculation (min): 43 min     PT Therapeutic Procedures Time Entry  Therapeutic Exercise Time Entry: 40                 Current Problem  1. Acute left-sided low back pain with left-sided sciatica  Follow Up In Physical Therapy          Insurance:  Visit number: 5/7  ANTHEM BOA COPAY 0 DED 0 COVERAGE 100  OOP 9350(283) AUTH REQ# [id number] 1 VISIT 4-22-25 to 5-6-25  REF#[id number]    ANTHEM MEDICARE APPROVED  6  PT  VISITS  4-30-25 THRU 6-28-25  AUTH# [id number]   [id number]   Precautions   Fall Risk       Subjective   Subjective:   Pt reports that he has very little pain in the back or thigh, however still has a lot of tingling in the knee. He has some tenderness in the knee.   Pain   7/10 knee    Objective   Treatments:  Therapeutic Exercise (52803) -     Supine quad set 5\"x12  Supine SAQ  10x w/ assist  Supine TA bracing 10x 5\"  Supine B UE push down against table 12x 3-5\"  Supine bridges 10x 3\"   Supine hip abd RTB 10x  Supine DKTC 10x2  Prone with no pillows underneath hips 1'Prone prop on elbows 3'  Prone press ups 10x2  Seated repeated flexion 10x  Modified Avinash Stretch: 30 sec x2 B---  Supine iso hip abd w/ belt 5-10\"x10----  Supine iso hip add w/ ball 5-10\"x10-----  Supine diaphramatic breathing 10x-----  Supine piriformis stretch B 20\"x2----  MINDI: 2x10---  Seated sciatic nerve glide L  20x---  Sit to stands 5x----   Standing Hip Flexor Stretch at Counter: 30 sec B---  Prone with 1 pillow underneath hips  3'----  Seated Correct Posture/Log Roll: reviewed  Lifting technique: reviewed     PA lumbar mobs 2'  OP EDUCATION:  Access Code: [email or web address]  URL: [email or web address]  Date: 05/27/2025  Prepared by: Radha Sandoval    Exercises  - Supine Quad Set  - 1 x daily - 7 x weekly - 2 sets - 10 reps - 5 hold  - " Seated Lumbar Flexion Stretch  - 1 x daily - 7 x weekly - 2 sets - 10 reps  Assessment:   Pt amb into clinic with cane, however, was holding onto walls/furniture as he is walking. Trialed DKTC, which pt thought seemed beneficial in terms of tightness, but no difference in L LE tingling. Introduced quad strengthening ex. Tactile cues given with quad sets to activate the quad vs the glutes. Pt unable to do SAQ w/o assist. Not able to initiate much contraction with seated LAQ. Pt able to use better form with seated lumbar flexion, vs supine.     Plan:   Increase L quad strength, decrease L LE tingling.

## 2025-05-27 ENCOUNTER — TREATMENT (OUTPATIENT)
Dept: PHYSICAL THERAPY | Facility: CLINIC | Age: 70
End: 2025-05-27
Payer: MEDICARE

## 2025-05-27 DIAGNOSIS — M54.42 ACUTE LEFT-SIDED LOW BACK PAIN WITH LEFT-SIDED SCIATICA: Primary | ICD-10-CM

## 2025-05-27 PROCEDURE — 97110 THERAPEUTIC EXERCISES: CPT | Mod: GP,CQ

## 2025-06-02 ENCOUNTER — APPOINTMENT (OUTPATIENT)
Dept: PRIMARY CARE | Facility: CLINIC | Age: 70
End: 2025-06-02
Payer: MEDICARE

## 2025-06-03 ENCOUNTER — TREATMENT (OUTPATIENT)
Dept: PHYSICAL THERAPY | Facility: CLINIC | Age: 70
End: 2025-06-03
Payer: MEDICARE

## 2025-06-03 DIAGNOSIS — M54.42 ACUTE LEFT-SIDED LOW BACK PAIN WITH LEFT-SIDED SCIATICA: Primary | ICD-10-CM

## 2025-06-03 PROCEDURE — 97110 THERAPEUTIC EXERCISES: CPT | Mod: GP | Performed by: PHYSICAL THERAPIST

## 2025-06-03 NOTE — PROGRESS NOTES
Physical Therapy Treatment    Patient Name: Zackary Goode  MRN: [id number]  Today's Date: 6/3/2025  Time Calculation  Start Time: 1255  Stop Time: 1335  Time Calculation (min): 40 min       PT Therapeutic Procedures Time Entry  Therapeutic Exercise Time Entry: 40                   INSURANCE:  Visit number: 6/7  ISMAEL MEDICARE APPROVED  6  PT  VISITS  4-30-25 to 6-28-25  AUTH# [id number]   ISMAEL BOA COPAY 0 DED 0 COVERAGE 100  OOP 9350(283) AUTH REQ# [id number] 1 VISIT 4-22-25 to 5-6-25  REF#[id number]      Referring Provider: Ap Grover MD  Hx: DM, HTN, Kidney Disease    CURRENT PROBLEM:  1. Acute left-sided low back pain with left-sided sciatica          SUBJECTIVE:   General -   Pt is doing home exercises.  No pain just numbness down the L LE    Pain -    Pain Location/Description: lumbar spine and L LE  Pain Today: 0/10 pain but numbness/weakness in the L LE    Precautions -    Fall risk    OBJECTIVE:     Diastasis Recti: 2 fingers umbilicus, 1 finger an inch above, 0 finger an inch below  Doming of abdominals during TA exercises    Treatment -   Therapeutic Exercise (25693) -  DKTC with UE assistance: 2x10  Piriformis Stretch: 1 min ea B  Posterior Pelvic Tilt: 5 sec x10  PB press into thighs: 5 sec x2 min  Supine DKTC with PB: 2 min  Supine SAQ  2x10 w/ assist L  Supine bridges: 2x10  Seated TA PB push: 1 min  Standing TA PB push: 1 min  Standing TA Hip AB/Ext: x10 ea B  Seated PB flexion rollouts 3-way: x10 ea     OP Patient Education -   Access Code: [email or web address]  URL: [email or web address]  Date: 06/03/2025  Prepared by: Jasmin Beauchamp    Exercises  - Supine Double Knee to Chest Modified  - 1-2 x daily - 3 sets - 10 reps  - Abdominal Press into Ball  - 1-2 x daily - 5 sec hold  - Seated Multifidi Isometric  - 1-2 x daily - 2 sets - 10 reps - 5 sec  hold  - Seated Flexion Stretch with Swiss Ball  - 1 x daily - 2 sets - 10 reps  - Seated Thoracic Flexion and  Rotation with Swiss Ball  - 1 x daily - 2 sets - 10 reps    ASSESSMENT:     DKTC increased pain in L knee, which educated might be a good sign since it was numb previously. Continued to work on TA activation throughout visit for improved stabilization of lumbo-pelvic spine. Noted some doming signs which could be affecting intraabdominal pressure management. Pt tolerated session well and is progressing towards functional needs. Continue to progress pt within tolerance and POC.    PLAN:    Continue to progress pt within tolerance. Assess response to new exercises.

## 2025-06-14 DIAGNOSIS — E11.65 TYPE 2 DIABETES MELLITUS WITH HYPERGLYCEMIA, WITHOUT LONG-TERM CURRENT USE OF INSULIN: ICD-10-CM

## 2025-06-14 DIAGNOSIS — E11.65 UNCONTROLLED TYPE 2 DIABETES MELLITUS WITH HYPERGLYCEMIA: ICD-10-CM

## 2025-06-14 DIAGNOSIS — N18.31 STAGE 3A CHRONIC KIDNEY DISEASE (MULTI): ICD-10-CM

## 2025-06-14 DIAGNOSIS — I10 ESSENTIAL HYPERTENSION: ICD-10-CM

## 2025-06-16 RX ORDER — DULAGLUTIDE 1.5 MG/.5ML
INJECTION, SOLUTION SUBCUTANEOUS
Qty: 2 ML | Refills: 1 | Status: SHIPPED | OUTPATIENT
Start: 2025-06-16

## 2025-06-16 RX ORDER — DAPAGLIFLOZIN 10 MG/1
TABLET, FILM COATED ORAL
Qty: 30 TABLET | Refills: 0 | Status: SHIPPED | OUTPATIENT
Start: 2025-06-16

## 2025-06-16 RX ORDER — BISOPROLOL FUMARATE AND HYDROCHLOROTHIAZIDE 5; 6.25 MG/1; MG/1
1 TABLET ORAL
Qty: 90 TABLET | Refills: 0 | Status: SHIPPED | OUTPATIENT
Start: 2025-06-16

## 2025-06-18 ENCOUNTER — APPOINTMENT (OUTPATIENT)
Dept: PHYSICAL THERAPY | Facility: CLINIC | Age: 70
End: 2025-06-18
Payer: MEDICARE

## 2025-06-26 ENCOUNTER — TREATMENT (OUTPATIENT)
Dept: PHYSICAL THERAPY | Facility: CLINIC | Age: 70
End: 2025-06-26
Payer: MEDICARE

## 2025-06-26 DIAGNOSIS — I10 ESSENTIAL HYPERTENSION: ICD-10-CM

## 2025-06-26 DIAGNOSIS — M54.42 ACUTE LEFT-SIDED LOW BACK PAIN WITH LEFT-SIDED SCIATICA: Primary | ICD-10-CM

## 2025-06-26 DIAGNOSIS — E11.65 TYPE 2 DIABETES MELLITUS WITH HYPERGLYCEMIA, WITHOUT LONG-TERM CURRENT USE OF INSULIN: ICD-10-CM

## 2025-06-26 PROCEDURE — 97110 THERAPEUTIC EXERCISES: CPT | Mod: GP

## 2025-06-26 RX ORDER — LOSARTAN POTASSIUM 25 MG/1
25 TABLET ORAL DAILY
Qty: 90 TABLET | Refills: 0 | Status: SHIPPED | OUTPATIENT
Start: 2025-06-26

## 2025-06-26 NOTE — PROGRESS NOTES
Physical Therapy Treatment    Patient Name: Zackary Goode  MRN: [id number]  Today's Date: 6/26/2025  Time Calculation  Start Time: 1125  Stop Time: 1155  Time Calculation (min): 30 min       PT Therapeutic Procedures Time Entry  Therapeutic Exercise Time Entry: 30        INSURANCE:  Visit number: 7/7  ISMAEL MEDICARE APPROVED  6  PT  VISITS  4-30-25 to 6-28-25  AUTH# [id number]   ISMAEL ZHOUA COPAY 0 DED 0 COVERAGE 100  OOP 9350(283) AUTH REQ# [id number] 1 VISIT 4-22-25 to 5-6-25  REF#[id number]/ALL      Referring Provider: Ap Grover MD  Hx: DM, HTN, Kidney Disease    CURRENT PROBLEM:  1. Acute left-sided low back pain with left-sided sciatica            SUBJECTIVE:   General -   Pt notes at least 75-80% improvement since starting therapy. He continues to note numbness in L knee area that is progressively decreasing in size. He notes increased steadiness and balance and notes being able to walk at home without  use of cane as well as grocery shop with minimal to no limitations provided use of grocery cart. He states he has no back of leg pain and only some minor weakness of R knee compared to L that is only limiting him with participation in more strenuous outdoor activities. He notes no concerns being able to perform all IADLs/ADLs provided current level of function and is happy with progress in therapy thus far. He is still disappointed in residual knee numbness and mild strength deficits but notes no limitations with everything he needs to do. He is confident he can continue with HEP independently and is interested in trialing progression at home and will follow-up with PCP next month to determine further need for therapy if desired.     Pain -    Pain Location/Description: lumbar spine and L LE  Pain Today: 0/10 pain but numbness/weakness in the L LE    Precautions -    Fall risk    OBJECTIVE:   ANÍBAL: 24% (12 raw)  Spine Musculoskeletal Exam    Gait    Assistive device: cane     Right      Heel walk: able to heel walk      Toe walk: able to toe walk    Left      Heel walk: able to heel walk      Toe walk: able to toe walk    Palpation    Thoracolumbar    Thoracolumbar palpation is normal.    Tenderness: none    Right      Muscle tone: normal    Left      Muscle tone: normal    Range of Motion    Thoracolumbar       Flexion: normal. Flexion detail: no pain.     Extension: normal. Extension detail: no pain.       Right      Lateral bending: normal. Lateral bending detail: no pain.       Lateral rotation: normal. Lateral rotation detail: no pain.       Left      Lateral bending: normal. Lateral bending detail: no pain.       Lateral rotation: normal. Lateral rotation detail: no pain.      Strength    Thoracolumbar       Right      Extensor hallucis longus: 4+/5.       Tibialis anterior: 4+/5.       Tibialis posterior: 4+/5.       Plantar flexion: 4+/5.       Peroneals: 4+/5.       Quadriceps: 4+/5.       Hamstrin+/5.       Hip abductors: 4+/5.       Hip flexion: 4+/5.       Hip adduction: 4+/5.        Left      Extensor hallucis longus: 4+/5.       Tibialis anterior: 4+/5.       Tibialis posterior: 4+/5.       Plantar flexion: 4+/5.       Peroneals: 4+/5.       Quadriceps: 4-/5.       Hamstrin+/5.       Hip abductors: 4+/5.       Hip flexion: 4/5.       Hip adduction: 4+/5.      Sensory    Thoracolumbar    Thoracolumbar sensation is normal.      Left      Anterior thigh: decreased      Anterior knee: decreased    Neurovascular    Thoracolumbar    Thoracolumbar neurovascular exam is normal.    Special Tests    Thoracolumbar    Thoracolumbar special tests are normal.    Hip Musculoskeletal Exam  Gait    Assistive device: cane    Palpation    Right        Right hip palpation is normal.      Tenderness: none    Left        Left hip palpation is normal.      Tenderness: none    Range of Motion    Right      Right hip range of motion is within functional limits.     Left      Left hip range  of motion is within functional limits.     Strength    Right      Extension: 4+/5.       Flexion: 4+/5.       Internal rotation: 4+/5.       External rotation: 4+/5.       Adduction: 4+/5.       Abduction: 4+/5.     Left      Extension: 4+/5.       Flexion: 4/5.       Internal rotation: 4+/5.       External rotation: 4+/5.       Adduction: 4+/5.       Abduction: 4+/5.     Neurovascular    Right        Right hip neurovascular exam is normal.    Left        Left hip neurovascular exam is normal.    Special Tests    Right        Right hip special tests are normal.    Left        Left hip special tests are normal.       Treatment -   Objective re-assessment + measurements: 20' (billed as therapeutic exercise)  HEP Review: 10'      OP Patient Education -   Pt encouraged to progress HEP at home as tolerated and continue with current HEP a minimum of 3x/week. He was encouraged to utilize walking outside or grocery store provided appropriate assistive device to improve muscular endurance and circulation to ultimately promote healing. He was instructed on the benefit of consistency with HEP and walking to maintain current gains as well as progress and expedite healing process. He was encouraged to reach out to PCP/MD or ED if noticing increased symptoms of numbness or weakness especially related to any saddle anesthesia or bowel or bladder continence. Pt acknowledged good understanding and was in agreement to all above information.      ASSESSMENT:   Pt has demonstrated significant gains towards meeting all of his therapy goals. Pt has met all of his STG. Pt has met 4/5 of his long-term therapy goals and is only currently limited with some mild L quadriceps and hip flexion weakness that is progressively improving week to week. He demonstrates improved steadiness and balance as noted with ability to walk without cane at home and throughout whole grocery store. Pt is able to perform all IADLs/ADLs including driving, getting  in/out of car, showering, grocery shopping with minimal to no modification and no pain. Pt will benefit from discharge this date to progress with HEP independently at home. Pt can consider therapy in future if warranted after follow-up with PCP. Discharge patient from skilled therapy services effective today 6/26/25.    Goals -    STG - After about 6 weeks:  Pt will report less than a 4/10 pain at the worst. (MET)  Pt will be able to manage changes in intra-abdominal pressure with appropriate thoracic diaphragm, pelvic, and TA muscle activation during functional activities that cause symptoms. (MET)  Pt will increase by 1 MMT grade for B hips in order to aid with completion of ADLs. (MET)  Pt will report MDIC with ANÍBAL score. (MET)  Pt will have AROM by 20% for the lumbar spine. (MET)     LTG - after about 12 weeks:  Pt will report less than a 0-2/10 pain at the worst. (MET - pt self-reports no pain this date)  Pt will have at least 4+/5 to 5/5 strength for B hips in order to aid with completion of ADLs. (MET - pt demonstrates 4+/5 or greater in B hip strength testing and reports being able to walk at home without cane and grocery store provided use of cart  Pt will have AROM to WFL for the lumbar spine. (MET - pt demonstrates pain-free WFL of lumbar spine AROM)  Pt will report 50% improvement with ANÍBAL score. (Progressing - ANÍBAL score improved to 24% or 12 raw score reported this date)  Pt will be independent with progressed HEP. (MET - pt has great recall and confidence in progressing HEP independently at home with no concerns)      PLAN:    Discharged from skilled therapy effective 6/26/25 as pt has met all therapy goals and no longer requires skilled therapy services

## 2025-07-07 ENCOUNTER — OFFICE VISIT (OUTPATIENT)
Age: 70
End: 2025-07-07
Payer: MEDICARE

## 2025-07-07 VITALS
HEART RATE: 82 BPM | SYSTOLIC BLOOD PRESSURE: 100 MMHG | WEIGHT: 170 LBS | HEIGHT: 70 IN | BODY MASS INDEX: 24.34 KG/M2 | DIASTOLIC BLOOD PRESSURE: 63 MMHG

## 2025-07-07 DIAGNOSIS — E11.9 TYPE 2 DIABETES MELLITUS WITHOUT COMPLICATION, WITHOUT LONG-TERM CURRENT USE OF INSULIN (HCC): Primary | ICD-10-CM

## 2025-07-07 LAB
CHP ED QC CHECK: ABNORMAL
GLUCOSE BLD-MCNC: 255 MG/DL
HBA1C MFR BLD: 7.3 %

## 2025-07-07 PROCEDURE — 3051F HG A1C>EQUAL 7.0%<8.0%: CPT | Performed by: INTERNAL MEDICINE

## 2025-07-07 PROCEDURE — 1159F MED LIST DOCD IN RCRD: CPT | Performed by: INTERNAL MEDICINE

## 2025-07-07 PROCEDURE — 83036 HEMOGLOBIN GLYCOSYLATED A1C: CPT | Performed by: INTERNAL MEDICINE

## 2025-07-07 PROCEDURE — 3078F DIAST BP <80 MM HG: CPT | Performed by: INTERNAL MEDICINE

## 2025-07-07 PROCEDURE — 1123F ACP DISCUSS/DSCN MKR DOCD: CPT | Performed by: INTERNAL MEDICINE

## 2025-07-07 PROCEDURE — 99203 OFFICE O/P NEW LOW 30 MIN: CPT | Performed by: INTERNAL MEDICINE

## 2025-07-07 PROCEDURE — 1126F AMNT PAIN NOTED NONE PRSNT: CPT | Performed by: INTERNAL MEDICINE

## 2025-07-07 PROCEDURE — 82962 GLUCOSE BLOOD TEST: CPT | Performed by: INTERNAL MEDICINE

## 2025-07-07 PROCEDURE — 3074F SYST BP LT 130 MM HG: CPT | Performed by: INTERNAL MEDICINE

## 2025-07-07 RX ORDER — DAPAGLIFLOZIN 10 MG/1
TABLET, FILM COATED ORAL
COMMUNITY
Start: 2025-06-16

## 2025-07-07 RX ORDER — ACYCLOVIR 400 MG/1
TABLET ORAL
COMMUNITY
Start: 2025-07-03

## 2025-07-07 RX ORDER — LOSARTAN POTASSIUM 25 MG/1
25 TABLET ORAL DAILY
COMMUNITY
Start: 2025-06-26

## 2025-07-07 RX ORDER — ACYCLOVIR 400 MG/1
TABLET ORAL
COMMUNITY
Start: 2025-04-14

## 2025-07-07 ASSESSMENT — ENCOUNTER SYMPTOMS: BACK PAIN: 1

## 2025-07-07 NOTE — PROGRESS NOTES
(VITAMIN B 12 PO), Take 2,500 mg by mouth daily, Disp: , Rfl:     vitamin D (CHOLECALCIFEROL) 50 MCG (2000 UT) TABS tablet, Take 1 tablet by mouth daily, Disp: , Rfl:     ferrous sulfate 27 MG TABS, Take 1 tablet by mouth daily, Disp: , Rfl:     blood glucose test strips (ACURA BLOOD GLUCOSE TEST) strip, Checks 1 time daily. NIDDM--ICD-10 E11.9, Disp: 100 each, Rfl: 3    Blood Glucose Monitoring Suppl (ACURA BLOOD GLUCOSE METER) w/Device KIT, 1 Device by Does not apply route 2 times daily DISP WITH TEST STRIP AND LANCETS CHECK BID # 200 3 RF, Disp: 1 kit, Rfl: 0  Lab Results   Component Value Date     (L) 09/14/2024    K 3.8 09/14/2024    CL 96 09/14/2024    CO2 27 09/14/2024    BUN 13 09/14/2024    CREATININE 1.38 (H) 09/14/2024    GLUCOSE 96 09/14/2024    CALCIUM 9.4 09/14/2024    BILITOT 0.4 09/14/2024    ALKPHOS 51 09/14/2024    AST 17 09/14/2024    ALT 19 09/14/2024    LABGLOM 54.6 (L) 09/14/2024    GFRAA >60.0 05/12/2021    GLOB 2.9 09/14/2024     Lab Results   Component Value Date    WBC 6.8 09/14/2024    HGB 12.6 (L) 09/14/2024    HCT 37.8 (L) 09/14/2024    MCV 92.0 09/14/2024     09/14/2024     Lab Results   Component Value Date    LABA1C 7.3 (H) 07/07/2025    LABA1C 8.0 06/09/2023    LABA1C 7.5 03/09/2023     Lab Results   Component Value Date    HDL 54 05/12/2021    HDL 51 06/05/2020    HDL 59 01/17/2019    CHOL 159 05/12/2021    CHOL 152 06/05/2020    CHOL 184 01/17/2019    TRIG 131 05/12/2021    TRIG 90 06/05/2020    TRIG 106 01/17/2019     No results found for: \"TESTM\"  Lab Results   Component Value Date    TSH 2.020 01/17/2019    TSH 2.570 07/16/2018    TSH 2.430 04/16/2018     No results found for: \"TPOABS\"    Review of Systems   Eyes: Negative.    Cardiovascular: Negative.    Endocrine: Negative for polydipsia and polyuria.   Musculoskeletal:  Positive for back pain.        Left radiculopathy    All other systems reviewed and are negative.      Objective:   Physical Exam  Vitals

## 2025-07-08 ENCOUNTER — TELEPHONE (OUTPATIENT)
Dept: PRIMARY CARE | Facility: CLINIC | Age: 70
End: 2025-07-08
Payer: MEDICARE

## 2025-07-08 NOTE — TELEPHONE ENCOUNTER
Pt calling to follow up. Saw Dr. Sumner yesterday, pt is weighting 170lbs and A1C is now 7.3 down from 9.1.  Dr. Sumner says Farxiga and Trulicity regimen are working.

## 2025-07-09 ASSESSMENT — ENCOUNTER SYMPTOMS: EYES NEGATIVE: 1

## 2025-07-15 ASSESSMENT — ENCOUNTER SYMPTOMS
LEG PAIN: 1
BACK PAIN: 1
NUMBNESS: 1

## 2025-07-16 ENCOUNTER — APPOINTMENT (OUTPATIENT)
Dept: PRIMARY CARE | Facility: CLINIC | Age: 70
End: 2025-07-16
Payer: MEDICARE

## 2025-07-16 VITALS
DIASTOLIC BLOOD PRESSURE: 77 MMHG | HEIGHT: 70 IN | WEIGHT: 169.7 LBS | SYSTOLIC BLOOD PRESSURE: 126 MMHG | OXYGEN SATURATION: 96 % | HEART RATE: 71 BPM | BODY MASS INDEX: 24.29 KG/M2

## 2025-07-16 DIAGNOSIS — E11.65 TYPE 2 DIABETES MELLITUS WITH HYPERGLYCEMIA, WITHOUT LONG-TERM CURRENT USE OF INSULIN: Primary | ICD-10-CM

## 2025-07-16 DIAGNOSIS — I10 ESSENTIAL HYPERTENSION: ICD-10-CM

## 2025-07-16 DIAGNOSIS — Z91.89 FRAMINGHAM CARDIAC RISK >20% IN NEXT 10 YEARS: Chronic | ICD-10-CM

## 2025-07-16 DIAGNOSIS — N18.31 STAGE 3A CHRONIC KIDNEY DISEASE (MULTI): ICD-10-CM

## 2025-07-16 DIAGNOSIS — R26.0 ATAXIC GAIT: ICD-10-CM

## 2025-07-16 DIAGNOSIS — H90.3 SENSORINEURAL HEARING LOSS (SNHL) OF BOTH EARS: ICD-10-CM

## 2025-07-16 DIAGNOSIS — D51.9 ANEMIA DUE TO VITAMIN B12 DEFICIENCY, UNSPECIFIED B12 DEFICIENCY TYPE: ICD-10-CM

## 2025-07-16 DIAGNOSIS — Z28.21 COVID-19 VACCINATION DECLINED: ICD-10-CM

## 2025-07-16 DIAGNOSIS — E78.00 PURE HYPERCHOLESTEROLEMIA WITH TARGET LOW DENSITY LIPOPROTEIN (LDL) CHOLESTEROL LESS THAN 70 MG/DL: ICD-10-CM

## 2025-07-16 DIAGNOSIS — Z28.21 HERPES ZOSTER VACCINATION DECLINED: ICD-10-CM

## 2025-07-16 DIAGNOSIS — M54.42 ACUTE LEFT-SIDED LOW BACK PAIN WITH LEFT-SIDED SCIATICA: ICD-10-CM

## 2025-07-16 DIAGNOSIS — R80.9 MICROALBUMINURIA: ICD-10-CM

## 2025-07-16 DIAGNOSIS — E55.9 VITAMIN D DEFICIENCY: ICD-10-CM

## 2025-07-16 DIAGNOSIS — E53.8 VITAMIN B12 DEFICIENCY: ICD-10-CM

## 2025-07-16 PROCEDURE — 1160F RVW MEDS BY RX/DR IN RCRD: CPT | Performed by: INTERNAL MEDICINE

## 2025-07-16 PROCEDURE — 3008F BODY MASS INDEX DOCD: CPT | Performed by: INTERNAL MEDICINE

## 2025-07-16 PROCEDURE — 4010F ACE/ARB THERAPY RXD/TAKEN: CPT | Performed by: INTERNAL MEDICINE

## 2025-07-16 PROCEDURE — 1036F TOBACCO NON-USER: CPT | Performed by: INTERNAL MEDICINE

## 2025-07-16 PROCEDURE — 99213 OFFICE O/P EST LOW 20 MIN: CPT | Performed by: INTERNAL MEDICINE

## 2025-07-16 PROCEDURE — G2211 COMPLEX E/M VISIT ADD ON: HCPCS | Performed by: INTERNAL MEDICINE

## 2025-07-16 PROCEDURE — 3078F DIAST BP <80 MM HG: CPT | Performed by: INTERNAL MEDICINE

## 2025-07-16 PROCEDURE — 1159F MED LIST DOCD IN RCRD: CPT | Performed by: INTERNAL MEDICINE

## 2025-07-16 PROCEDURE — 3074F SYST BP LT 130 MM HG: CPT | Performed by: INTERNAL MEDICINE

## 2025-07-16 ASSESSMENT — COLUMBIA-SUICIDE SEVERITY RATING SCALE - C-SSRS
2. HAVE YOU ACTUALLY HAD ANY THOUGHTS OF KILLING YOURSELF?: NO
1. IN THE PAST MONTH, HAVE YOU WISHED YOU WERE DEAD OR WISHED YOU COULD GO TO SLEEP AND NOT WAKE UP?: NO
6. HAVE YOU EVER DONE ANYTHING, STARTED TO DO ANYTHING, OR PREPARED TO DO ANYTHING TO END YOUR LIFE?: NO

## 2025-07-16 ASSESSMENT — ENCOUNTER SYMPTOMS
BACK PAIN: 1
NUMBNESS: 1
LEG PAIN: 1

## 2025-07-16 NOTE — PATIENT INSTRUCTIONS
Thank you very much for coming.  I am very happy to see you again.  Thank you for waiting!    Let us have you undergo another bout of PHYSICAL THERAPY, then let us see each other again.  Please do FASTING LABORATORY examinations at the [facility] facility, or any  facility convenient to you.  Please dial [phone] to schedule an appointment, so that you do not have to wait for too long.  Do your laboratory examinations a few days before your next appointment.    In the meantime, I am glad to hear that you have been with Dr. Sumner, and that your latest hemoglobin A1c is 7.3!  This is very good.    It will help you to take MAGNESIUM, because it helps strengthen your bones and make sure nerves work better.  MAGNESIUM OXIDE 250 mg, take 1 tablet by mouth twice a day, always with food.  Watch out for diarrhea.  Again, magnesium is a good idea!    Your kidneys are sensitive.  Please make sure to keep up with lots of fluids.  Avoid excessive salt.  You can still enjoy some salty snacks like French fries, but small amounts only, and please be conscious of the increase in your salt intake.  Whenever you do this, add an extra glass of water to your regimen!    Please continue to take care of yourself and your family, and please continue to pray for our recovery from this pandemic.    You will benefit from a SHINGLES vaccination.  I am glad that you do not mind that I bring this up again, but hopefully, you will be convinced.    Again, thank you very much for coming.  See you in 6 weeks.  We will also do your Medicare Wellness evaluation at that time.  Please continue to take care of yourself and your family, and please continue to pray for our recovery from this pandemic.  Take care and God bless.            0  Return in 6 weeks.  40 minutes please.  FASTING laboratory examinations, then see me for Medicare Wellness evaluation for the year.  Coordinate with orthopedics, physical therapy, reassess debility, ataxia.   Coordinate with endocrinology.  Reassess kidney function, consider nephrology.            0

## 2025-07-16 NOTE — PROGRESS NOTES
"Subjective   Patient ID: Zackary Goode is a 70 y.o. male who presents for Follow-up (Patient presented today for a 6 week follow up./).    Back Pain  This is a chronic problem. The current episode started more than 1 month ago. The problem occurs every several days. The problem has been waxing and waning since onset. The pain is present in the sacro-iliac. The quality of the pain is described as aching. The pain radiates to the left foot, left knee and left thigh. The pain is at a severity of 7/10. The pain is Worse during the night. The symptoms are aggravated by lying down and sitting. Associated symptoms include leg pain and numbness. Risk factors include lack of exercise and obesity.        Review of Systems   Musculoskeletal:  Positive for back pain.   Neurological:  Positive for numbness.     Above presented by patient before appointment, verified during visit.  ENDOCRINE with no polyuria, polydipsia, polyphagia.  No blurred vision.  No skin, hair, nail changes.  No dramatic weight loss or weight gain.      No sonia substernal chest pain, no orthopnea, no paroxysmal nocturnal dyspnea.  No particular cough or sputum production.  CONSTITUTIONALLY, no fever, no chills.  No night sweats.  No lingering anorexia or nausea.  No apparent lymphadenopathy.  No apparent weight loss.    No gum or nose bleeding.  No easy bruisability.  No apparent blood in urine or stool.  No dysuria.    No headache, no blurred vision, no diplopia.  No dysphagia.  Continues to want to improve quality of life, does not wish harm to self or others.        Objective   /77 (BP Location: Left arm, Patient Position: Sitting, BP Cuff Size: Adult)   Pulse 71   Ht 1.778 m (5' 10\")   Wt 77 kg (169 lb 11.2 oz)   SpO2 96%   BMI 24.35 kg/m²     Physical Exam  In hopeful spirits.  Not in distress or diaphoresis.  Alert, oriented x 3.  Amiable.  Not unkempt.  Does want to improve quality of life, and does not wish harm to self or others.  " Moderately built, not cachectic.  Appropriate.    HEAD pink palpebral conjunctivae, anicteric sclerae.  NECK supple, no apparent jugular venous distention.  CARDIOVASCULAR not in distress or diaphoresis.  No bipedal edema.  LUNGS not in distress or diaphoresis.  Not using accessory muscles.  ABDOMEN soft, nontender.  BACK no costovertebral angle tenderness.  EXTREMITIES no clubbing, no cyanosis.  NEURO no facial asymmetry.  No apparent cranial nerve deficits.  Romberg negative.  Ambulating without need of assistance.  No apparent focal weakness.  No tremors.  PSYCH receptive, appropriate, and eager to maintain and improve quality of life.        LABORATORY results reviewed, with hemoglobin A1c 7.3 care of Dr. Sumner dated July 7, 2025.  Unable to update chart.  In March, stage IIIb kidney function noted.  In February:  Lipid panel noted 185/61/LDL cholesterol 105/triglycerides 92.  ASCVD risk noted 36.3%.  Preserved liver function.  Magnesium 1.8.  Uric acid 6.6.  Stable kidney function, preserved liver function noted.  MICROALBUMINURIA noted.  No anemia.  Ferritin 33.  Folic acid, B12, adequate.  Vitamin D 42.  Previous hemoglobin A1c 9.1.        Assessment/Plan   Diagnoses and all orders for this visit:  Type 2 diabetes mellitus with hyperglycemia, without long-term current use of insulin  -     Follow Up In Primary Care - Established  -     Comprehensive Metabolic Panel; Future  Acute left-sided low back pain with left-sided sciatica  -     Referral to Physical Therapy; Future  Stage 3a chronic kidney disease (Multi)  -     CBC and Auto Differential; Future  -     Comprehensive Metabolic Panel; Future  -     Magnesium; Future  -     Creatine Kinase; Future  Essential hypertension  -     CBC and Auto Differential; Future  -     Comprehensive Metabolic Panel; Future  -     Magnesium; Future  -     Creatine Kinase; Future  Pure hypercholesterolemia with target low density lipoprotein (LDL) cholesterol less than 70  mg/dL  -     Comprehensive Metabolic Panel; Future  -     Lipid Panel; Future  [place] cardiac risk >20% in next 10 years  Comments:  39.8% 08/2024  Orders:  -     Comprehensive Metabolic Panel; Future  -     Lipid Panel; Future  Microalbuminuria  Anemia due to vitamin B12 deficiency, unspecified B12 deficiency type  -     CBC and Auto Differential; Future  Vitamin D deficiency  -     Vitamin D 25-Hydroxy,Total (for eval of Vitamin D levels); Future  Sensorineural hearing loss (SNHL) of both ears  COVID-19 vaccination declined  Herpes zoster vaccination declined  Vitamin B12 deficiency  -     Vitamin B12; Future  Ataxic gait  -     Referral to Physical Therapy; Future  Other orders  -     Follow Up In Primary Care - Established; Future       Thank you very much for coming.  I am very happy to see you again.  Thank you for waiting!    Let us have you undergo another bout of PHYSICAL THERAPY, then let us see each other again.  Please do FASTING LABORATORY examinations at the [facility] facility, or any  facility convenient to you.  Please dial [phone] to schedule an appointment, so that you do not have to wait for too long.  Do your laboratory examinations a few days before your next appointment.    In the meantime, I am glad to hear that you have been with Dr. Sumner, and that your latest hemoglobin A1c is 7.3!  This is very good.    It will help you to take MAGNESIUM, because it helps strengthen your bones and make sure nerves work better.  MAGNESIUM OXIDE 250 mg, take 1 tablet by mouth twice a day, always with food.  Watch out for diarrhea.  Again, magnesium is a good idea!    Your kidneys are sensitive.  Please make sure to keep up with lots of fluids.  Avoid excessive salt.  You can still enjoy some salty snacks like French fries, but small amounts only, and please be conscious of the increase in your salt intake.  Whenever you do this, add an extra glass of water to your regimen!    Please continue to  take care of yourself and your family, and please continue to pray for our recovery from this pandemic.    You will benefit from a SHINGLES vaccination.  I am glad that you do not mind that I bring this up again, but hopefully, you will be convinced.    Again, thank you very much for coming.  See you in 6 weeks.  We will also do your Medicare Wellness evaluation at that time.  Please continue to take care of yourself and your family, and please continue to pray for our recovery from this pandemic.  Take care and God bless.            0  Return in 6 weeks.  40 minutes please.  FASTING laboratory examinations, then see me for Medicare Wellness evaluation for the year.  Coordinate with orthopedics, physical therapy, reassess debility, ataxia.  Coordinate with endocrinology.  Reassess kidney function, consider nephrology.            0

## 2025-07-25 ENCOUNTER — APPOINTMENT (OUTPATIENT)
Dept: PHYSICAL THERAPY | Facility: CLINIC | Age: 70
End: 2025-07-25
Payer: MEDICARE

## 2025-07-27 DIAGNOSIS — E11.65 UNCONTROLLED TYPE 2 DIABETES MELLITUS WITH HYPERGLYCEMIA: ICD-10-CM

## 2025-07-27 DIAGNOSIS — E11.65 TYPE 2 DIABETES MELLITUS WITH HYPERGLYCEMIA, WITHOUT LONG-TERM CURRENT USE OF INSULIN: ICD-10-CM

## 2025-07-27 DIAGNOSIS — N18.31 STAGE 3A CHRONIC KIDNEY DISEASE (MULTI): ICD-10-CM

## 2025-07-28 DIAGNOSIS — E11.65 UNCONTROLLED TYPE 2 DIABETES MELLITUS WITH HYPERGLYCEMIA: ICD-10-CM

## 2025-07-28 DIAGNOSIS — E11.65 TYPE 2 DIABETES MELLITUS WITH HYPERGLYCEMIA, WITHOUT LONG-TERM CURRENT USE OF INSULIN: ICD-10-CM

## 2025-07-28 DIAGNOSIS — N18.31 STAGE 3A CHRONIC KIDNEY DISEASE (MULTI): ICD-10-CM

## 2025-07-28 RX ORDER — DAPAGLIFLOZIN 10 MG/1
TABLET, FILM COATED ORAL
Qty: 30 TABLET | Refills: 0 | Status: SHIPPED | OUTPATIENT
Start: 2025-07-28 | End: 2025-07-28 | Stop reason: SDUPTHER

## 2025-07-28 RX ORDER — DAPAGLIFLOZIN 10 MG/1
TABLET, FILM COATED ORAL
Qty: 30 TABLET | Refills: 0 | Status: SHIPPED | OUTPATIENT
Start: 2025-07-28

## 2025-07-30 ENCOUNTER — EVALUATION (OUTPATIENT)
Dept: PHYSICAL THERAPY | Facility: HOSPITAL | Age: 70
End: 2025-07-30
Payer: MEDICARE

## 2025-07-30 DIAGNOSIS — M54.42 ACUTE LEFT-SIDED LOW BACK PAIN WITH LEFT-SIDED SCIATICA: Primary | ICD-10-CM

## 2025-07-30 PROCEDURE — 97110 THERAPEUTIC EXERCISES: CPT | Mod: GP

## 2025-07-30 PROCEDURE — 97162 PT EVAL MOD COMPLEX 30 MIN: CPT | Mod: GP

## 2025-07-30 ASSESSMENT — PAIN - FUNCTIONAL ASSESSMENT: PAIN_FUNCTIONAL_ASSESSMENT: 0-10

## 2025-07-30 ASSESSMENT — PAIN SCALES - GENERAL: PAINLEVEL_OUTOF10: 0 - NO PAIN

## 2025-07-30 NOTE — PROGRESS NOTES
Physical Therapy    Physical Therapy Evaluation and Treatment      Patient Name: Zackary Goode  MRN: [id number]  Today's Date: 7/30/2025    Time Entry:   Time Calculation  Start Time: 1130  Stop Time: 1210  Time Calculation (min): 40 min  PT Evaluation Time Entry  PT Evaluation (Moderate) Time Entry: 20  PT Therapeutic Procedures Time Entry  Therapeutic Exercise Time Entry: 20                 Insurance  ANTHEM               BOA            COPAY 0   DED (MET) COV 80 OOP 9350([id number])                       AUTH REQ AFTER EVAL   REF#[id number] 1 VISIT 7-25-25 THRU 8-8-25     Visit: 1    Assessment:  PT Assessment  PT Assessment Results: Decreased strength, Decreased endurance, Impaired balance, Decreased mobility, Pain  Rehab Prognosis: Good  Evaluation/Treatment Tolerance: Patient tolerated treatment well   Patient is a  69 y/o male presents with c/o L LE radicular symptoms s/p fall. Upon examination patient demonstrates decreased lumbar ROM with decreased L LE strength limiting overall functional mobility including recent falls, difficulty with ambulation, difficulty with IADLs d/t increased time needed and inability to participate in recreational activities. Pt to benefit from outpatient PT to address deficits, maximize functional mobility and improve QOL.  The clinical presentation of this patient is evolving and their history and examination findings are consistent with a moderate complexity evaluation with good rehab potential.        Plan:  OP PT Plan  Treatment/Interventions: Education/ Instruction, Manual therapy, Neuromuscular re-education, Self care/ home management, Taping techniques, Therapeutic activities, Therapeutic exercises  PT Plan: Skilled PT  PT Frequency: 1 time per week  Duration: 6 visits  Onset Date: 03/01/25  Certification Period Start Date: 07/30/25  Certification Period End Date: 10/28/25  Rehab Potential: Good  Plan of Care Agreement: Patient    Current Problem:   1. Acute left-sided low back  pain with left-sided sciatica  Follow Up In Physical Therapy          Subjective    General:  Medical History Form: Reviewed (scanned into chart)    Subjective:     Chief Complaint: Patient presents to clinic L LE radicular symptoms. Discharged from PT 6/26/25 to HEP. Notes no worse in symptoms since. Semi compliant with HEP upon discharge. Has been watching TikTok and learning that he should not do some things and has stopped.     Denies pain currently, but did have pain in L quad over the weekend for three days, he is unsure why.     Denies pain in lumbar spine.     Typically sits and watch TV all day     Had a fall 4/16/25 and went to ED. Symptoms in L LE began pretty soon after.    Notes tingling in L anterior knee and to shin and ankle    Denies bowel and bladder and incontinence or saddle anesthesia's. Denies constipation or urinary leakage      Can do an activity for 30-45 minutes prior to needing to sit     Current Condition:   Same    Pain:  Pain Assessment: 0-10  0-10 (Numeric) Pain Score: 0 - No pain  Highest pain level in the past week: 10/10    Location: denies pain today, but notes pain to L quad, knee and lower leg all anteriorly  Aggravating Factors:  patient is limited activities. Asked multiple times what increases symptoms and did not elaborate but states that he can perform all needed ADLs  Relieving Factors:  Rest, tylenol for pain relief when leg pain occurs     Relevant Information (PMH & Previous Tests/Imaging): MRI lumbar spine IMPRESSION:  1. Transitional vertebra: The S1 sacral segment is partially  lumbarized, although with a predominantly sacral configuration. Full  details in the report above.  2. Multilevel lumbar spondylosis with mild chronic scoliosis.  3. Severe chronic trefoil canal stenosis at L4-5 level, distorting  the cauda equina.  4. A very small left L5-S1 disc extrusion, with mild caudal extension  down behind S1. This contacts and mildly distorts the traversing left  S1  "nerve, severely narrowing the left lateral recess.  5. Multilevel moderate lateral recess stenosis: Bilateral L3-4 and  right L5-S1.  6. Moderate to severe chronic distal left L5-S1 neural foramen  stenosis. Moderate right foramen narrowing at both L4-5 and L5-S1  levels.  Previous Interventions/Treatments: Physical Therapy    Prior Level of Function (PLOF)  Patient previously independent with all ADLs  Exercise/Physical Activity: mostly sitting and watching TV  Work/School: retired    Patients Living Environment: Reviewed and no concern    PT noticed bladder urgency and frequency with 2 bathroom usages within 30 minutes     Primary Language: English    Patient's Goal(s) for Therapy: \"get rid of back pain and left next leg back to normal walking\" hy    Red Flags: Do you have any of the following? No  Fever/chills, unexplained weight changes, dizziness/fainting, unexplained change in bowel or bladder functions, unexplained malaise or muscle weakness, night pain/sweats, numbness or tingling    General  Reason for Referral: L Lumbar radiculopathy  Referred By: Dr. Ap Grover  Past Medical History Relevant to Rehab: DM, HTN  Precautions:  Precautions  Precautions Comment: fall       Pain:  Pain Assessment  Pain Assessment: 0-10  0-10 (Numeric) Pain Score: 0 - No pain  Home Living: lives alone     Prior Level of Function:   Does not drive, yardwork, cleaning home, wash dishes, cook, clean (notes that he is slower with completing these activities)         Objective                 BALANCE: impaired, use of assistive device to maintain balance              GAIT: use of SPC in R hand, decreased dayanara, uneven step length,             Posture: forward head, rounded shoulders       AROM    Lumbar flexion: 50% limitation       Lumbar extension: 50% limitation       Lumbar sidebending right: WFL      Sidebending left: WFL      Hip flexion right:  100    Hip flexion left: 100      Hip extension right: 0-5     Hip extension " left: 0-5      Hip ER supine right: 40     Hip ER supine left: 40      Hip IR supine right: 10-15     Hip IR supine left: 10-15      MMT:    Right hip ER: 4-/5      Left hip ER: 4-/5      Right hip IR: 4+/5     Left hip IR: 4+/5      (L3-L4) Right quadriceps: 4/5     Left quadriceps: 3+/5      (L1-L2) Right iliopsoas: 4/5      Left iliopsoas:3+/5      (S2) Right hip abductors supine: 4-/5     Left hip abductors supine: 3+/5      (L1-L2) Right hip adductors supine: NT     Left hip adductors supine: NT      (L5-S1) Right gluteus jackelyn: 4/5     Left gluteus jackelyn: 4/5      (S1-S2) Right hamstrings: 4/5      Left hamstrings: 4/5    L DF: 4/5    Flexibility:    Right iliopsoas: poor     Left iliopsoas: poor      Right rectus femoris:  poor    Left rectus femoris: poor      Right hamstring: poor     Left hamstring: poor      Outcome Measures:  Other Measures  Lower Extremity Funtional Score (LEFS): 40     Treatments:  Press up: 2x10  MINDI: x10    EDUCATION:  Outpatient Education  Individual(s) Educated: Patient  Education Provided: Anatomy, Home Exercise Program, Physiology, POC, Posture  Risk and Benefits Discussed with Patient/Caregiver/Other: yes  Patient/Caregiver Demonstrated Understanding: yes  Plan of Care Discussed and Agreed Upon: yes  Patient Response to Education: Patient/Caregiver Verbalized Understanding of Information    Goals:  1. Patient will be independent with HEP  2. Patient will demonstrate B LE strength >/=4/5 to improve functional mobility   3. Patient will be able to ambulate community distances without assistive device  4. Patient will demonstrate ability to perform sit <> stand without UE support demonstrating improved LE strength  5. Patient will report resolution of L LE radicular symptoms

## 2025-07-30 NOTE — LETTER
July 30, 2025    Adilene Steen DPT  [address] [place], [address]  [facility] [place] [address]    Patient: Zackary Goode   YOB: 1955   Date of Visit: 7/30/2025       Dear Ap Grover MD  [address]   [facility], [address]  [place] [address]    The attached plan of care is being sent to you because your patient’s medical reimbursement requires that you certify the plan of care. Your signature is required to allow uninterrupted insurance coverage.      You may indicate your approval by signing below and faxing this form back to us at Dept Fax: [phone].    Please call Dept: [phone] with any questions or concerns.    Thank you for this referral,        Adilene Steen DPT  [address] [facility]  [address] [place] [id number]    Payer: Payor: [employer] / Plan: [employer] DUAL ADVANTAGE / Product Type: *No Product type* /                                                                         Date:     Dear Adilene Steen DPT,     Re: Mr. Zackary Goode, MRN:[id number]    I certify that I have reviewed the attached plan of care and it is medically necessary for Mr. Zackary Goode (1955) who is under my care.          ______________________________________                    _________________  Provider name and credentials                                           Date and time                                                                                           Plan of Care 7/30/25   Effective from: 7/30/2025  Effective to: 10/28/2025    Plan ID: [id number]            Participants as of Finalize on 7/30/2025    Name Type Comments Contact Info    Ap Grover MD PCP - [employer] PCP  [phone]    Adilene Steen DPT Physical Therapist  [phone]       Last Plan Note     Author: Adilene Steen DPT Status: Incomplete Last edited: 7/30/2025  11:30 AM       Physical Therapy    Physical Therapy Evaluation and Treatment      Patient Name: Zackary Goode  MRN: [id number]  Today's Date: 7/30/2025    Time Entry:   Time Calculation  Start Time: 1130  Stop Time: 1210  Time Calculation (min): 40 min  PT Evaluation Time Entry  PT Evaluation (Moderate) Time Entry: 20  PT Therapeutic Procedures Time Entry  Therapeutic Exercise Time Entry: 20                 Insurance  [employer]M               BOA            COPAY 0   DED (MET) COV 80 OOP 9350(667)                       AUTH REQ AFTER EVAL   REF#[id number] 1 VISIT 7-25-25 THRU 8-8-25     Visit: 1    Assessment:  PT Assessment  PT Assessment Results: Decreased strength, Decreased endurance, Impaired balance, Decreased mobility, Pain  Rehab Prognosis: Good  Evaluation/Treatment Tolerance: Patient tolerated treatment well   Patient is a  69 y/o male presents with c/o L LE radicular symptoms s/p fall. Upon examination patient demonstrates decreased lumbar ROM with decreased L LE strength limiting overall functional mobility including recent falls, difficulty with ambulation, difficulty with IADLs d/t increased time needed and inability to participate in recreational activities. Pt to benefit from outpatient PT to address deficits, maximize functional mobility and improve QOL.  The clinical presentation of this patient is evolving and their history and examination findings are consistent with a moderate complexity evaluation with good rehab potential.        Plan:  OP PT Plan  Treatment/Interventions: Education/ Instruction, Manual therapy, Neuromuscular re-education, Self care/ home management, Taping techniques, Therapeutic activities, Therapeutic exercises  PT Plan: Skilled PT  PT Frequency: 1 time per week  Duration: 6 visits  Onset Date: 03/01/25  Certification Period Start Date: 07/30/25  Certification Period End Date: 10/28/25  Rehab Potential: Good  Plan of Care Agreement: Patient    Current Problem:   1. Acute  left-sided low back pain with left-sided sciatica  Follow Up In Physical Therapy          Subjective   General:  Medical History Form: Reviewed (scanned into chart)    Subjective:     Chief Complaint: Patient presents to clinic L LE radicular symptoms. Discharged from PT 6/26/25 to HEP. Notes no worse in symptoms since. Semi compliant with HEP upon discharge. Has been watching TikTok and learning that he should not do some things and has stopped.     Denies pain currently, but did have pain in L quad over the weekend for three days, he is unsure why.     Denies pain in lumbar spine.     Typically sits and watch TV all day     Had a fall 4/16/25 and went to ED. Symptoms in L LE began pretty soon after.    Notes tingling in L anterior knee and to shin and ankle    Denies bowel and bladder and incontinence or saddle anesthesia's. Denies constipation or urinary leakage      Can do an activity for 30-45 minutes prior to needing to sit     Current Condition:   Same    Pain:  Pain Assessment: 0-10  0-10 (Numeric) Pain Score: 0 - No pain  Highest pain level in the past week: 10/10    Location: denies pain today, but notes pain to L quad, knee and lower leg all anteriorly  Aggravating Factors:  patient is limited activities. Asked multiple times what increases symptoms and did not elaborate but states that he can perform all needed ADLs  Relieving Factors:  Rest, tylenol for pain relief when leg pain occurs     Relevant Information (PMH & Previous Tests/Imaging): MRI lumbar spine IMPRESSION:  1. Transitional vertebra: The S1 sacral segment is partially  lumbarized, although with a predominantly sacral configuration. Full  details in the report above.  2. Multilevel lumbar spondylosis with mild chronic scoliosis.  3. Severe chronic trefoil canal stenosis at L4-5 level, distorting  the cauda equina.  4. A very small left L5-S1 disc extrusion, with mild caudal extension  down behind S1. This contacts and mildly distorts the  "traversing left  S1 nerve, severely narrowing the left lateral recess.  5. Multilevel moderate lateral recess stenosis: Bilateral L3-4 and  right L5-S1.  6. Moderate to severe chronic distal left L5-S1 neural foramen  stenosis. Moderate right foramen narrowing at both L4-5 and L5-S1  levels.  Previous Interventions/Treatments: Physical Therapy    Prior Level of Function (PLOF)  Patient previously independent with all ADLs  Exercise/Physical Activity: mostly sitting and watching TV  Work/School: retired    Patients Living Environment: Reviewed and no concern    PT noticed bladder urgency and frequency with 2 bathroom usages within 30 minutes     Primary Language: English    Patient's Goal(s) for Therapy: \"get rid of back pain and left next leg back to normal walking\" hy    Red Flags: Do you have any of the following? No  Fever/chills, unexplained weight changes, dizziness/fainting, unexplained change in bowel or bladder functions, unexplained malaise or muscle weakness, night pain/sweats, numbness or tingling    General  Reason for Referral: L Lumbar radiculopathy  Referred By: Dr. Ap Grover  Past Medical History Relevant to Rehab: DM, HTN  Precautions:  Precautions  Precautions Comment: fall       Pain:  Pain Assessment  Pain Assessment: 0-10  0-10 (Numeric) Pain Score: 0 - No pain  Home Living: lives alone     Prior Level of Function:   Does not drive, yardwork, cleaning home, wash dishes, cook, clean (notes that he is slower with completing these activities)         Objective                BALANCE: impaired, use of assistive device to maintain balance              GAIT: use of SPC in R hand, decreased dayanara, uneven step length,             Posture: forward head, rounded shoulders       AROM    Lumbar flexion: 50% limitation       Lumbar extension: 50% limitation       Lumbar sidebending right: WFL      Sidebending left: WFL      Hip flexion right:  100    Hip flexion left: 100      Hip extension right: 0-5   "   Hip extension left: 0-5      Hip ER supine right: 40     Hip ER supine left: 40      Hip IR supine right: 10-15     Hip IR supine left: 10-15      MMT:    Right hip ER: 4-/5      Left hip ER: 4-/5      Right hip IR: 4+/5     Left hip IR: 4+/5      (L3-L4) Right quadriceps: 4/5     Left quadriceps: 3+/5      (L1-L2) Right iliopsoas: 4/5      Left iliopsoas:3+/5      (S2) Right hip abductors supine: 4-/5     Left hip abductors supine: 3+/5      (L1-L2) Right hip adductors supine: NT     Left hip adductors supine: NT      (L5-S1) Right gluteus jackelyn: 4/5     Left gluteus jackelyn: 4/5      (S1-S2) Right hamstrings: 4/5      Left hamstrings: 4/5    L DF: 4/5    Flexibility:    Right iliopsoas: poor     Left iliopsoas: poor      Right rectus femoris:  poor    Left rectus femoris: poor      Right hamstring: poor     Left hamstring: poor      Outcome Measures:  Other Measures  Lower Extremity Funtional Score (LEFS): 40     Treatments:  Press up: 2x10  MINDI: x10    EDUCATION:  Outpatient Education  Individual(s) Educated: Patient  Education Provided: Anatomy, Home Exercise Program, Physiology, POC, Posture  Risk and Benefits Discussed with Patient/Caregiver/Other: yes  Patient/Caregiver Demonstrated Understanding: yes  Plan of Care Discussed and Agreed Upon: yes  Patient Response to Education: Patient/Caregiver Verbalized Understanding of Information    Goals:  1. Patient will be independent with HEP  2. Patient will demonstrate B LE strength >/=4/5 to improve functional mobility   3. Patient will be able to ambulate community distances without assistive device  4. Patient will demonstrate ability to perform sit <> stand without UE support demonstrating improved LE strength  5. Patient will report resolution of L LE radicular symptoms                  Current Participants as of 7/30/2025    Name Type Comments Contact Info    Ap Grover MD PCP - [employer] PCP  [phone]     Signature pending    MIROSLAVA ThomasT Physical Therapist  [phone]    Electronically signed by Adilene Steen DPT at 7/30/2025 2688 EDT

## 2025-07-31 ENCOUNTER — TELEPHONE (OUTPATIENT)
Dept: PHYSICAL THERAPY | Facility: CLINIC | Age: 70
End: 2025-07-31
Payer: MEDICARE

## 2025-07-31 LAB
25(OH)D3+25(OH)D2 SERPL-MCNC: 46 NG/ML (ref 30–100)
ALBUMIN SERPL-MCNC: 4.3 G/DL (ref 3.6–5.1)
ALP SERPL-CCNC: 45 U/L (ref 35–144)
ALT SERPL-CCNC: 17 U/L (ref 9–46)
ANION GAP SERPL CALCULATED.4IONS-SCNC: 9 MMOL/L (CALC) (ref 7–17)
AST SERPL-CCNC: 17 U/L (ref 10–35)
BASOPHILS # BLD AUTO: 61 CELLS/UL (ref 0–200)
BASOPHILS NFR BLD AUTO: 0.8 %
BILIRUB SERPL-MCNC: 0.4 MG/DL (ref 0.2–1.2)
BUN SERPL-MCNC: 21 MG/DL (ref 7–25)
CALCIUM SERPL-MCNC: 9.7 MG/DL (ref 8.6–10.3)
CHLORIDE SERPL-SCNC: 107 MMOL/L (ref 98–110)
CHOLEST SERPL-MCNC: 173 MG/DL
CHOLEST/HDLC SERPL: 3.3 (CALC)
CK SERPL-CCNC: 154 U/L (ref 19–278)
CO2 SERPL-SCNC: 24 MMOL/L (ref 20–32)
CREAT SERPL-MCNC: 1.31 MG/DL (ref 0.7–1.28)
EGFRCR SERPLBLD CKD-EPI 2021: 58 ML/MIN/1.73M2
EOSINOPHIL # BLD AUTO: 160 CELLS/UL (ref 15–500)
EOSINOPHIL NFR BLD AUTO: 2.1 %
ERYTHROCYTE [DISTWIDTH] IN BLOOD BY AUTOMATED COUNT: 13.1 % (ref 11–15)
GLUCOSE SERPL-MCNC: 167 MG/DL (ref 65–139)
HCT VFR BLD AUTO: 41.9 % (ref 38.5–50)
HDLC SERPL-MCNC: 52 MG/DL
HGB BLD-MCNC: 13.5 G/DL (ref 13.2–17.1)
LDLC SERPL CALC-MCNC: 95 MG/DL (CALC)
LYMPHOCYTES # BLD AUTO: 1368 CELLS/UL (ref 850–3900)
LYMPHOCYTES NFR BLD AUTO: 18 %
MAGNESIUM SERPL-MCNC: 2.2 MG/DL (ref 1.5–2.5)
MCH RBC QN AUTO: 31 PG (ref 27–33)
MCHC RBC AUTO-ENTMCNC: 32.2 G/DL (ref 32–36)
MCV RBC AUTO: 96.3 FL (ref 80–100)
MONOCYTES # BLD AUTO: 600 CELLS/UL (ref 200–950)
MONOCYTES NFR BLD AUTO: 7.9 %
NEUTROPHILS # BLD AUTO: 5411 CELLS/UL (ref 1500–7800)
NEUTROPHILS NFR BLD AUTO: 71.2 %
NONHDLC SERPL-MCNC: 121 MG/DL (CALC)
PLATELET # BLD AUTO: 209 THOUSAND/UL (ref 140–400)
PMV BLD REES-ECKER: 11.2 FL (ref 7.5–12.5)
POTASSIUM SERPL-SCNC: 4.2 MMOL/L (ref 3.5–5.3)
PROT SERPL-MCNC: 7.2 G/DL (ref 6.1–8.1)
RBC # BLD AUTO: 4.35 MILLION/UL (ref 4.2–5.8)
SODIUM SERPL-SCNC: 140 MMOL/L (ref 135–146)
TRIGL SERPL-MCNC: 163 MG/DL
VIT B12 SERPL-MCNC: 1143 PG/ML (ref 200–1100)
WBC # BLD AUTO: 7.6 THOUSAND/UL (ref 3.8–10.8)

## 2025-07-31 NOTE — TELEPHONE ENCOUNTER
Patient stated he will call back when he has schedule with him.     Auth: ISMAEL APPROVED  6  PT  VISITS  8-4-25 THRU 10-2-25    POC:1x 6weeks

## 2025-08-14 ENCOUNTER — TREATMENT (OUTPATIENT)
Dept: PHYSICAL THERAPY | Facility: HOSPITAL | Age: 70
End: 2025-08-14
Payer: MEDICARE

## 2025-08-14 DIAGNOSIS — M54.42 ACUTE LEFT-SIDED LOW BACK PAIN WITH LEFT-SIDED SCIATICA: Primary | ICD-10-CM

## 2025-08-14 PROCEDURE — 97110 THERAPEUTIC EXERCISES: CPT | Mod: GP | Performed by: PHYSICAL THERAPIST

## 2025-08-21 ENCOUNTER — TREATMENT (OUTPATIENT)
Dept: PHYSICAL THERAPY | Facility: HOSPITAL | Age: 70
End: 2025-08-21
Payer: MEDICARE

## 2025-08-21 PROCEDURE — 97110 THERAPEUTIC EXERCISES: CPT | Mod: GP,CQ

## 2025-08-21 PROCEDURE — 97140 MANUAL THERAPY 1/> REGIONS: CPT | Mod: GP,CQ

## 2025-08-28 ENCOUNTER — APPOINTMENT (OUTPATIENT)
Dept: PHYSICAL THERAPY | Facility: HOSPITAL | Age: 70
End: 2025-08-28
Payer: MEDICARE

## 2025-08-28 DIAGNOSIS — E78.00 PURE HYPERCHOLESTEROLEMIA WITH TARGET LOW DENSITY LIPOPROTEIN (LDL) CHOLESTEROL LESS THAN 70 MG/DL: ICD-10-CM

## 2025-08-28 RX ORDER — SIMVASTATIN 10 MG/1
10 TABLET, FILM COATED ORAL NIGHTLY
Qty: 90 TABLET | Refills: 0 | Status: SHIPPED | OUTPATIENT
Start: 2025-08-28

## 2025-09-04 ENCOUNTER — TREATMENT (OUTPATIENT)
Dept: PHYSICAL THERAPY | Facility: HOSPITAL | Age: 70
End: 2025-09-04
Payer: MEDICARE

## 2025-09-04 PROCEDURE — 97110 THERAPEUTIC EXERCISES: CPT | Mod: GP,CQ

## 2025-09-18 ENCOUNTER — APPOINTMENT (OUTPATIENT)
Dept: PRIMARY CARE | Facility: CLINIC | Age: 70
End: 2025-09-18
Payer: MEDICARE